# Patient Record
Sex: MALE
[De-identification: names, ages, dates, MRNs, and addresses within clinical notes are randomized per-mention and may not be internally consistent; named-entity substitution may affect disease eponyms.]

---

## 2021-09-10 PROBLEM — Z00.00 ENCOUNTER FOR PREVENTIVE HEALTH EXAMINATION: Status: ACTIVE | Noted: 2021-09-10

## 2021-10-11 ENCOUNTER — APPOINTMENT (OUTPATIENT)
Dept: INTERNAL MEDICINE | Facility: CLINIC | Age: 66
End: 2021-10-11

## 2024-12-11 ENCOUNTER — NON-APPOINTMENT (OUTPATIENT)
Age: 69
End: 2024-12-11

## 2024-12-11 PROBLEM — Z86.0100 HISTORY OF COLONIC POLYPS: Status: RESOLVED | Noted: 2024-12-11 | Resolved: 2024-12-11

## 2024-12-11 PROBLEM — R91.1 LUNG NODULE: Status: ACTIVE | Noted: 2024-12-11

## 2024-12-11 PROBLEM — K21.00 REFLUX ESOPHAGITIS: Status: ACTIVE | Noted: 2024-12-11

## 2024-12-11 PROBLEM — R16.0 LIVER MASS: Status: ACTIVE | Noted: 2024-12-11

## 2024-12-13 ENCOUNTER — APPOINTMENT (OUTPATIENT)
Dept: SURGICAL ONCOLOGY | Facility: CLINIC | Age: 69
End: 2024-12-13

## 2024-12-13 ENCOUNTER — NON-APPOINTMENT (OUTPATIENT)
Age: 69
End: 2024-12-13

## 2024-12-13 VITALS
HEART RATE: 49 BPM | RESPIRATION RATE: 17 BRPM | HEIGHT: 73 IN | BODY MASS INDEX: 20.54 KG/M2 | WEIGHT: 155 LBS | DIASTOLIC BLOOD PRESSURE: 71 MMHG | OXYGEN SATURATION: 99 % | SYSTOLIC BLOOD PRESSURE: 136 MMHG

## 2024-12-13 DIAGNOSIS — K21.00 GASTRO-ESOPHAGEAL REFLUX DISEASE WITH ESOPHAGITIS, WITHOUT BLEEDING: ICD-10-CM

## 2024-12-13 DIAGNOSIS — Z86.0100 PERSONAL HISTORY OF COLON POLYPS, UNSPECIFIED: ICD-10-CM

## 2024-12-13 DIAGNOSIS — R91.1 SOLITARY PULMONARY NODULE: ICD-10-CM

## 2024-12-13 PROCEDURE — 99205 OFFICE O/P NEW HI 60 MIN: CPT

## 2024-12-14 LAB
AFP-TM SERPL-MCNC: <1.8 NG/ML
ALBUMIN SERPL ELPH-MCNC: 4.3 G/DL
ALP BLD-CCNC: 110 U/L
ALT SERPL-CCNC: 16 U/L
ANION GAP SERPL CALC-SCNC: 12 MMOL/L
APTT BLD: 28.8 SEC
AST SERPL-CCNC: 22 U/L
BASOPHILS # BLD AUTO: 0.03 K/UL
BASOPHILS NFR BLD AUTO: 0.7 %
BILIRUB SERPL-MCNC: 0.6 MG/DL
BUN SERPL-MCNC: 16 MG/DL
CALCIUM SERPL-MCNC: 9.3 MG/DL
CANCER AG19-9 SERPL-ACNC: 12 U/ML
CEA SERPL-MCNC: <0.6 NG/ML
CHLORIDE SERPL-SCNC: 104 MMOL/L
CO2 SERPL-SCNC: 25 MMOL/L
CREAT SERPL-MCNC: 1.25 MG/DL
EGFR: 63 ML/MIN/1.73M2
EOSINOPHIL # BLD AUTO: 0.09 K/UL
EOSINOPHIL NFR BLD AUTO: 2.1 %
GLUCOSE SERPL-MCNC: 105 MG/DL
HAV IGM SER QL: NONREACTIVE
HBV CORE IGG+IGM SER QL: NONREACTIVE
HBV SURFACE AB SER QL: NONREACTIVE
HBV SURFACE AG SER QL: NONREACTIVE
HCT VFR BLD CALC: 37.5 %
HCV AB SER QL: NONREACTIVE
HCV S/CO RATIO: 0.09 S/CO
HGB BLD-MCNC: 12.2 G/DL
IMM GRANULOCYTES NFR BLD AUTO: 0.2 %
INR PPP: 0.87 RATIO
LYMPHOCYTES # BLD AUTO: 1.18 K/UL
LYMPHOCYTES NFR BLD AUTO: 27.6 %
MAN DIFF?: NORMAL
MCHC RBC-ENTMCNC: 32 PG
MCHC RBC-ENTMCNC: 32.5 G/DL
MCV RBC AUTO: 98.4 FL
MONOCYTES # BLD AUTO: 0.48 K/UL
MONOCYTES NFR BLD AUTO: 11.2 %
NEUTROPHILS # BLD AUTO: 2.49 K/UL
NEUTROPHILS NFR BLD AUTO: 58.2 %
PLATELET # BLD AUTO: 196 K/UL
POTASSIUM SERPL-SCNC: 4.9 MMOL/L
PROT SERPL-MCNC: 7.4 G/DL
PT BLD: 10.3 SEC
RBC # BLD: 3.81 M/UL
RBC # FLD: 12.4 %
SODIUM SERPL-SCNC: 141 MMOL/L
WBC # FLD AUTO: 4.28 K/UL

## 2024-12-16 ENCOUNTER — APPOINTMENT (OUTPATIENT)
Dept: HEMATOLOGY ONCOLOGY | Facility: CLINIC | Age: 69
End: 2024-12-16
Payer: MEDICARE

## 2024-12-16 VITALS
RESPIRATION RATE: 18 BRPM | WEIGHT: 152 LBS | DIASTOLIC BLOOD PRESSURE: 78 MMHG | BODY MASS INDEX: 20.15 KG/M2 | SYSTOLIC BLOOD PRESSURE: 131 MMHG | HEIGHT: 73 IN | HEART RATE: 36 BPM | OXYGEN SATURATION: 100 % | TEMPERATURE: 97.6 F

## 2024-12-16 PROCEDURE — 99205 OFFICE O/P NEW HI 60 MIN: CPT

## 2024-12-16 PROCEDURE — G2211 COMPLEX E/M VISIT ADD ON: CPT

## 2024-12-16 PROCEDURE — G2212 PROLONG OUTPT/OFFICE VIS: CPT

## 2024-12-17 ENCOUNTER — APPOINTMENT (OUTPATIENT)
Dept: NUCLEAR MEDICINE | Facility: HOSPITAL | Age: 69
End: 2024-12-17

## 2024-12-17 ENCOUNTER — NON-APPOINTMENT (OUTPATIENT)
Age: 69
End: 2024-12-17

## 2024-12-17 ENCOUNTER — OUTPATIENT (OUTPATIENT)
Dept: OUTPATIENT SERVICES | Facility: HOSPITAL | Age: 69
LOS: 1 days | End: 2024-12-17
Payer: MEDICARE

## 2024-12-17 PROCEDURE — 82962 GLUCOSE BLOOD TEST: CPT

## 2024-12-17 PROCEDURE — A9552: CPT

## 2024-12-17 PROCEDURE — 78815 PET IMAGE W/CT SKULL-THIGH: CPT | Mod: 26,PI,MH

## 2024-12-17 PROCEDURE — 78815 PET IMAGE W/CT SKULL-THIGH: CPT

## 2024-12-18 PROBLEM — I10 BENIGN ESSENTIAL HTN: Status: ACTIVE | Noted: 2024-12-18

## 2024-12-18 PROBLEM — J45.909 ACTIVE ASTHMA: Status: ACTIVE | Noted: 2024-12-18

## 2024-12-18 PROBLEM — E78.5 HLD (HYPERLIPIDEMIA): Status: ACTIVE | Noted: 2024-12-18

## 2024-12-19 ENCOUNTER — APPOINTMENT (OUTPATIENT)
Dept: INTERVENTIONAL RADIOLOGY/VASCULAR | Facility: CLINIC | Age: 69
End: 2024-12-19

## 2024-12-19 DIAGNOSIS — Z86.19 PERSONAL HISTORY OF OTHER INFECTIOUS AND PARASITIC DISEASES: ICD-10-CM

## 2024-12-19 DIAGNOSIS — Z78.9 OTHER SPECIFIED HEALTH STATUS: ICD-10-CM

## 2024-12-19 DIAGNOSIS — I10 ESSENTIAL (PRIMARY) HYPERTENSION: ICD-10-CM

## 2024-12-19 DIAGNOSIS — I49.3 VENTRICULAR PREMATURE DEPOLARIZATION: ICD-10-CM

## 2024-12-19 DIAGNOSIS — J45.909 UNSPECIFIED ASTHMA, UNCOMPLICATED: ICD-10-CM

## 2024-12-19 DIAGNOSIS — R16.0 HEPATOMEGALY, NOT ELSEWHERE CLASSIFIED: ICD-10-CM

## 2024-12-19 DIAGNOSIS — Z85.46 PERSONAL HISTORY OF MALIGNANT NEOPLASM OF PROSTATE: ICD-10-CM

## 2024-12-19 DIAGNOSIS — J45.20 MILD INTERMITTENT ASTHMA, UNCOMPLICATED: ICD-10-CM

## 2024-12-19 DIAGNOSIS — E78.5 HYPERLIPIDEMIA, UNSPECIFIED: ICD-10-CM

## 2024-12-19 DIAGNOSIS — G43.909 MIGRAINE, UNSPECIFIED, NOT INTRACTABLE, W/OUT STATUS MIGRAINOSUS: ICD-10-CM

## 2024-12-19 RX ORDER — FAMOTIDINE 20 MG/1
20 TABLET, FILM COATED ORAL
Qty: 120 | Refills: 0 | Status: ACTIVE | COMMUNITY
Start: 2024-07-26

## 2024-12-19 RX ORDER — DILTIAZEM HYDROCHLORIDE 120 MG/1
120 CAPSULE, EXTENDED RELEASE ORAL
Qty: 60 | Refills: 0 | Status: ACTIVE | COMMUNITY
Start: 2023-12-29

## 2024-12-19 RX ORDER — ASCORBIC ACID 100 MG
100 TABLET,CHEWABLE ORAL
Refills: 0 | Status: ACTIVE | COMMUNITY
Start: 2024-12-19

## 2024-12-19 RX ORDER — OMEGA-3/DHA/EPA/FISH OIL 300-1000MG
400 CAPSULE ORAL DAILY
Refills: 0 | Status: ACTIVE | COMMUNITY
Start: 2024-12-19

## 2024-12-19 RX ORDER — ALBUTEROL SULFATE 90 UG/1
108 (90 BASE) INHALANT RESPIRATORY (INHALATION)
Qty: 26 | Refills: 0 | Status: ACTIVE | COMMUNITY
Start: 2024-07-02

## 2024-12-19 RX ORDER — PANTOPRAZOLE 40 MG/1
40 TABLET, DELAYED RELEASE ORAL
Qty: 60 | Refills: 0 | Status: ACTIVE | COMMUNITY
Start: 2024-05-24

## 2024-12-19 RX ORDER — ATORVASTATIN CALCIUM 10 MG/1
10 TABLET, FILM COATED ORAL
Qty: 90 | Refills: 0 | Status: ACTIVE | COMMUNITY
Start: 2024-07-01

## 2024-12-19 RX ORDER — FLUTICASONE FUROATE 100 UG/1
100 POWDER RESPIRATORY (INHALATION)
Qty: 60 | Refills: 0 | Status: ACTIVE | COMMUNITY
Start: 2024-06-10

## 2024-12-19 RX ORDER — MONTELUKAST 10 MG/1
10 TABLET, FILM COATED ORAL
Qty: 60 | Refills: 0 | Status: ACTIVE | COMMUNITY
Start: 2024-09-25

## 2024-12-19 RX ORDER — RIBOFLAVIN (VITAMIN B2) 400 MG
400 TABLET ORAL
Refills: 0 | Status: ACTIVE | COMMUNITY
Start: 2024-12-19

## 2024-12-20 ENCOUNTER — APPOINTMENT (OUTPATIENT)
Dept: HEMATOLOGY ONCOLOGY | Facility: CLINIC | Age: 69
End: 2024-12-20

## 2024-12-23 ENCOUNTER — NON-APPOINTMENT (OUTPATIENT)
Age: 69
End: 2024-12-23

## 2024-12-26 ENCOUNTER — NON-APPOINTMENT (OUTPATIENT)
Age: 69
End: 2024-12-26

## 2024-12-26 ENCOUNTER — RESULT REVIEW (OUTPATIENT)
Age: 69
End: 2024-12-26

## 2024-12-26 ENCOUNTER — TRANSCRIPTION ENCOUNTER (OUTPATIENT)
Age: 69
End: 2024-12-26

## 2024-12-26 ENCOUNTER — OUTPATIENT (OUTPATIENT)
Dept: OUTPATIENT SERVICES | Facility: HOSPITAL | Age: 69
LOS: 1 days | End: 2024-12-26
Payer: MEDICARE

## 2024-12-26 VITALS
WEIGHT: 153 LBS | SYSTOLIC BLOOD PRESSURE: 147 MMHG | TEMPERATURE: 98 F | HEIGHT: 73 IN | HEART RATE: 45 BPM | DIASTOLIC BLOOD PRESSURE: 85 MMHG | OXYGEN SATURATION: 100 % | RESPIRATION RATE: 18 BRPM

## 2024-12-26 VITALS
RESPIRATION RATE: 16 BRPM | OXYGEN SATURATION: 99 % | HEART RATE: 57 BPM | SYSTOLIC BLOOD PRESSURE: 117 MMHG | DIASTOLIC BLOOD PRESSURE: 74 MMHG

## 2024-12-26 DIAGNOSIS — G43.909 MIGRAINE, UNSPECIFIED, NOT INTRACTABLE, WITHOUT STATUS MIGRAINOSUS: ICD-10-CM

## 2024-12-26 LAB
BLD GP AB SCN SERPL QL: NEGATIVE — SIGNIFICANT CHANGE UP
RH IG SCN BLD-IMP: NEGATIVE — SIGNIFICANT CHANGE UP
RH IG SCN BLD-IMP: NEGATIVE — SIGNIFICANT CHANGE UP

## 2024-12-26 PROCEDURE — 76705 ECHO EXAM OF ABDOMEN: CPT | Mod: 26

## 2024-12-26 PROCEDURE — 77001 FLUOROGUIDE FOR VEIN DEVICE: CPT | Mod: 26

## 2024-12-26 PROCEDURE — 86850 RBC ANTIBODY SCREEN: CPT

## 2024-12-26 PROCEDURE — C1788: CPT

## 2024-12-26 PROCEDURE — 36561 INSERT TUNNELED CV CATH: CPT

## 2024-12-26 PROCEDURE — 36561 INSERT TUNNELED CV CATH: CPT | Mod: RT

## 2024-12-26 PROCEDURE — 76705 ECHO EXAM OF ABDOMEN: CPT

## 2024-12-26 PROCEDURE — C1769: CPT

## 2024-12-26 PROCEDURE — C1894: CPT

## 2024-12-26 PROCEDURE — 86901 BLOOD TYPING SEROLOGIC RH(D): CPT

## 2024-12-26 PROCEDURE — 86900 BLOOD TYPING SEROLOGIC ABO: CPT

## 2024-12-26 PROCEDURE — 76937 US GUIDE VASCULAR ACCESS: CPT | Mod: 26

## 2024-12-26 PROCEDURE — 77001 FLUOROGUIDE FOR VEIN DEVICE: CPT

## 2024-12-26 PROCEDURE — 76937 US GUIDE VASCULAR ACCESS: CPT

## 2024-12-26 RX ORDER — ATORVASTATIN CALCIUM 40 MG/1
1 TABLET, FILM COATED ORAL
Refills: 0 | DISCHARGE

## 2024-12-26 RX ORDER — DILTIAZEM HYDROCHLORIDE 300 MG/1
1 CAPSULE, COATED, EXTENDED RELEASE ORAL
Refills: 0 | DISCHARGE

## 2024-12-26 RX ORDER — FENTANYL 75 UG/H
50 PATCH, EXTENDED RELEASE TRANSDERMAL ONCE
Refills: 0 | Status: DISCONTINUED | OUTPATIENT
Start: 2024-12-26 | End: 2024-12-26

## 2024-12-26 RX ORDER — RIBOFLAVIN (VITAMIN B2)
1 POWDER (GRAM) MISCELLANEOUS
Refills: 0 | DISCHARGE

## 2024-12-26 RX ORDER — ONDANSETRON 4 MG/1
4 TABLET ORAL ONCE
Refills: 0 | Status: ACTIVE | OUTPATIENT
Start: 2024-12-26 | End: 2025-11-24

## 2024-12-26 RX ORDER — ALBUTEROL SULFATE 90 UG/1
2 INHALANT RESPIRATORY (INHALATION)
Refills: 0 | DISCHARGE

## 2024-12-26 RX ORDER — PANTOPRAZOLE 40 MG/1
1 TABLET, DELAYED RELEASE ORAL
Refills: 0 | DISCHARGE

## 2024-12-26 RX ORDER — MONTELUKAST SODIUM 10 MG/1
1 TABLET, FILM COATED ORAL
Refills: 0 | DISCHARGE

## 2024-12-26 RX ORDER — FAMOTIDINE 20 MG/1
1 TABLET, FILM COATED ORAL
Refills: 0 | DISCHARGE

## 2024-12-26 RX ORDER — ASCORBIC ACID 1000 MG
0 TABLET ORAL
Refills: 0 | DISCHARGE

## 2024-12-26 NOTE — PROCEDURE NOTE - PROCEDURE FINDINGS AND DETAILS
Technically successful right chest wall port placement w tip terminating in the superior atriocaval junction.

## 2024-12-26 NOTE — PRE-ANESTHESIA EVALUATION ADULT - NSANTHPEFT_GEN_ALL_CORE
Gen: alert and oriented x3  Lung: clear   CV: S1 S2   Ext: No edema  neuro: CN 2-12 grossly intact, no gross motor or sensory deficits appreciated  Skin: normal

## 2024-12-26 NOTE — ASU PATIENT PROFILE, ADULT - NSICDXPASTMEDICALHX_GEN_ALL_CORE_FT
PAST MEDICAL HISTORY:  Colonic polyp     Lung nodule     Malignant neoplasm of prostate     Reflux esophagitis

## 2024-12-26 NOTE — ASU DISCHARGE PLAN (ADULT/PEDIATRIC) - FINANCIAL ASSISTANCE
[facility] provides services at a reduced cost to those who are determined to be eligible through [facility]’s financial assistance program. Information regarding [facility]’s financial assistance program can be found by going to [email or web address] or by calling [phone].

## 2024-12-26 NOTE — H&P ADULT - HISTORY OF PRESENT ILLNESS
Interventional Radiology    HPI: 69y Male with liver lesion presents for liver lesion bx and port placement.    Review of Systems:   Constitutional: No fever  Neurological: No headaches  Respiratory: No cough, wheezing, chills or hemoptysis; No shortness of breath  Cardiovascular: No chest pain, palpitations, dizziness  Gastrointestinal: No abdominal or epigastric pain    Allergies: aspirin (Other)  penicillins (Other)    Medications (Abx/Cardiac/Anticoagulation/Blood Products)      Data:  185.4  69.4  T(C): 36.4  HR: 45  BP: 147/85  RR: 18  SpO2: 100%            Physical Exam  General: No acute distress, nontoxic, A&Ox3  Chest: Non labored breathing  Abdomen: Non-distended, non-tender, no preitoneal signs  Extremities: Warm    RADIOLOGY & ADDITIONAL TESTS:    Imaging Reviewed    Plan: 69y Male with liver lesion presents for liver lesion bx and port placement.  -Risks/Benefits/alternatives explained with the patient and/or healthcare proxy and witnessed informed consent obtained.

## 2024-12-26 NOTE — ASU DISCHARGE PLAN (ADULT/PEDIATRIC) - ASU DC SPECIAL INSTRUCTIONSFT
Biopsy Discharge Instructions    - You have had a biopsy of your liver lesion  - You may shower in 24 hours. No soaking or swimming until the site is completely healed.  - Keep the area covered and dry for the next 24 hours.  - Do not perform any heavy lifting for the next few days or until the site is healed.  - You may resume your normal diet.  - You may resume your normal medications however you should wait 48 hours before restarting aspirin, plavix, or blood thinners.  - It is normal to experience some pain over the site for the next few days. You may take apply ice to the area (20 minutes on, 20 minutes off) and take Tylenol for that pain. Do not take more frequently than every 6 hours and do not exceed more than 3000mg of Tylenol in a 24 hour period.    - You were given conscious sedation which may make you drowsy, therefore you need someone to stay with you until the morning following the procedure.  - Do not drive, engage in heavy lifting or strenuous activity, or drink any alcoholic beverages for the next 24 hours.   - You may resume normal activity in 24 hours.    Notify your primary physician and/or Interventional Radiology IMMEDIATELY if you experience any of the following       - Fever of 101F or 38C       - Chills or Rigors/ Shakes       - Swelling and/or Redness in the area around the biopsy site       - Worsening Pain       - Blood soaked bandages or worsening bleeding       - Lightheadedness and/or dizziness upon standing       - Chest Pain/ Tightness       - Shortness of Breath       - Difficulty walking    If you have a problem that you believe requires IMMEDIATE attention, please go to your NEAREST Emergency Room. If you believe your problem can safely wait until you speak to a physician, please call Interventional Radiology for any concerns.    During Normal Weekday Business Hours- You can contact the Interventional Radiology department during normal business hours via telephone.  During Evenings and Weekends- If you need to contact Interventional Radiology during off hours, do so by calling the hospital and requesting to be connected to the Interventional Radiologist on call.    Chest Port Placement    Discharge Instructions  - You have had a chest port implanted in your chest.   - The port is ready for use.  - You may shower in 48 hours. No soaking or swimming for 2 weeks or until the site is completely healed.  - Keep the area covered and dry for the next 7 days. It may be removed by a chemotherapy nurse as needed for treatment.  - Do not perform any heavy lifting or put tension on the area for the next week or until the site is healed.  - The skin glue (Dermabond) on your skin will act as a scab, allow it to fall off on its own over the next 1-3 weeks.  - You may resume your normal diet.  - You may resume your normal medications however you should wait 48 hours before restarting aspirin, plavix, or blood thinners.  - It is normal to experience some pain over the site for the next few days. You may take apply ice to the area (20 minutes on, 20 minutes off) and take Tylenol for that pain. Do not take more frequently than every 6 hours and do not exceed more than 3000mg of Tylenol in a 24 hour period.    - You were given conscious sedation which may make you drowsy, therefore you need someone to stay with you until the morning following the procedure.  - Do not drive, engage in heavy lifting or strenuous activity, or drink any alcoholic beverages for the next 24 hours.   - You may resume normal activity in 24 hours.    Notify your primary physician and/or Interventional Radiology IMMEDIATELY if you experience any of the following       - Fever of 101F or 38C       - Chills or Rigors/ Shakes       - Swelling and/or Redness in the area around the port       - Worsening Pain       - Blood soaked bandages or worsening bleeding       - Lightheadedness and/or dizziness upon standing       - Chest Pain/ Tightness       - Shortness of Breath       - Difficulty walking    If you have a problem that you believe requires IMMEDIATE attention, please go to your NEAREST Emergency Room. If you believe your problem can safely wait until you speak to a physician, please call Interventional Radiology for any concerns.    During Normal Weekday Business Hours- You can contact the Interventional Radiology department during normal business hours via telephone.  During Evenings and Weekends- If you need to contact Interventional Radiology during off hours, do so by calling the hospital and requesting to be connected to the Interventional Radiologist on call. Chest Port Placement    Discharge Instructions  - You have had a chest port implanted in your chest.   - The port is ready for use.  - You may shower in 48 hours. No soaking or swimming for 2 weeks or until the site is completely healed.  - Keep the area covered and dry for the next 7 days. It may be removed by a chemotherapy nurse as needed for treatment.  - Do not perform any heavy lifting or put tension on the area for the next week or until the site is healed.  - The skin glue (Dermabond) on your skin will act as a scab, allow it to fall off on its own over the next 1-3 weeks.  - You may resume your normal diet.  - You may resume your normal medications however you should wait 48 hours before restarting aspirin, plavix, or blood thinners.  - It is normal to experience some pain over the site for the next few days. You may take apply ice to the area (20 minutes on, 20 minutes off) and take Tylenol for that pain. Do not take more frequently than every 6 hours and do not exceed more than 3000mg of Tylenol in a 24 hour period.    - You were given conscious sedation which may make you drowsy, therefore you need someone to stay with you until the morning following the procedure.  - Do not drive, engage in heavy lifting or strenuous activity, or drink any alcoholic beverages for the next 24 hours.   - You may resume normal activity in 24 hours.    Notify your primary physician and/or Interventional Radiology IMMEDIATELY if you experience any of the following       - Fever of 101F or 38C       - Chills or Rigors/ Shakes       - Swelling and/or Redness in the area around the port       - Worsening Pain       - Blood soaked bandages or worsening bleeding       - Lightheadedness and/or dizziness upon standing       - Chest Pain/ Tightness       - Shortness of Breath       - Difficulty walking    If you have a problem that you believe requires IMMEDIATE attention, please go to your NEAREST Emergency Room. If you believe your problem can safely wait until you speak to a physician, please call Interventional Radiology for any concerns.    During Normal Weekday Business Hours- You can contact the Interventional Radiology department during normal business hours via telephone.  During Evenings and Weekends- If you need to contact Interventional Radiology during off hours, do so by calling the hospital and requesting to be connected to the Interventional Radiologist on call.

## 2024-12-26 NOTE — H&P ADULT - NSCORESITESY/N_GEN_A_CORE_RD
Reason for Call: Request for an order or referral:    Order or referral being requested: Ultrasound Order    Date needed: as soon as possible    Has the patient been seen by the PCP for this problem? YES    Additional comments: Patient wife requesting for ultrasound for stress echocardiogram. Patient wife does not need a call back, please mychart patient when order is ready. Please advise.     Phone number Patient can be reached at:  Other phone number:  [phone]    Best Time:  Anytime    Can we leave a detailed message on this number?  YES    Call taken on 2/25/2019 at 2:13 PM by Shonda Lux   No

## 2024-12-26 NOTE — PRE-ANESTHESIA EVALUATION ADULT - NSANTHOSAYNRD_GEN_A_CORE
No. YANY screening performed.  STOP BANG Legend: 0-2 = LOW Risk; 3-4 = INTERMEDIATE Risk; 5-8 = HIGH Risk

## 2024-12-27 ENCOUNTER — APPOINTMENT (OUTPATIENT)
Dept: INTERVENTIONAL RADIOLOGY/VASCULAR | Facility: HOSPITAL | Age: 69
End: 2024-12-27

## 2024-12-27 PROBLEM — R91.1 SOLITARY PULMONARY NODULE: Chronic | Status: ACTIVE | Noted: 2024-12-26

## 2024-12-27 PROBLEM — C61 MALIGNANT NEOPLASM OF PROSTATE: Chronic | Status: ACTIVE | Noted: 2024-12-26

## 2024-12-30 ENCOUNTER — APPOINTMENT (OUTPATIENT)
Dept: GASTROENTEROLOGY | Facility: CLINIC | Age: 69
End: 2024-12-30

## 2024-12-30 PROBLEM — K63.5 POLYP OF COLON: Chronic | Status: ACTIVE | Noted: 2024-12-26

## 2024-12-30 PROBLEM — K21.00 GASTRO-ESOPHAGEAL REFLUX DISEASE WITH ESOPHAGITIS, WITHOUT BLEEDING: Chronic | Status: ACTIVE | Noted: 2024-12-26

## 2024-12-30 PROBLEM — R93.3 ABNORMAL FINDING ON GI TRACT IMAGING: Status: ACTIVE | Noted: 2024-12-30

## 2024-12-30 PROCEDURE — 99204 OFFICE O/P NEW MOD 45 MIN: CPT

## 2024-12-31 ENCOUNTER — NON-APPOINTMENT (OUTPATIENT)
Age: 69
End: 2024-12-31

## 2024-12-31 DIAGNOSIS — Z45.2 ENCOUNTER FOR ADJUSTMENT AND MANAGEMENT OF VASCULAR ACCESS DEVICE: ICD-10-CM

## 2024-12-31 DIAGNOSIS — K76.9 LIVER DISEASE, UNSPECIFIED: ICD-10-CM

## 2025-01-03 ENCOUNTER — RESULT REVIEW (OUTPATIENT)
Age: 70
End: 2025-01-03

## 2025-01-03 ENCOUNTER — OUTPATIENT (OUTPATIENT)
Dept: OUTPATIENT SERVICES | Facility: HOSPITAL | Age: 70
LOS: 1 days | Discharge: HOME | End: 2025-01-03
Payer: MEDICARE

## 2025-01-03 ENCOUNTER — APPOINTMENT (OUTPATIENT)
Dept: GASTROENTEROLOGY | Facility: HOSPITAL | Age: 70
End: 2025-01-03

## 2025-01-03 ENCOUNTER — NON-APPOINTMENT (OUTPATIENT)
Age: 70
End: 2025-01-03

## 2025-01-03 ENCOUNTER — TRANSCRIPTION ENCOUNTER (OUTPATIENT)
Age: 70
End: 2025-01-03

## 2025-01-03 PROCEDURE — 88342 IMHCHEM/IMCYTCHM 1ST ANTB: CPT | Mod: 26

## 2025-01-03 PROCEDURE — 88305 TISSUE EXAM BY PATHOLOGIST: CPT | Mod: 26

## 2025-01-03 PROCEDURE — 88173 CYTOPATH EVAL FNA REPORT: CPT | Mod: 26

## 2025-01-03 PROCEDURE — 88173 CYTOPATH EVAL FNA REPORT: CPT

## 2025-01-03 PROCEDURE — 88305 TISSUE EXAM BY PATHOLOGIST: CPT

## 2025-01-03 PROCEDURE — 43242 EGD US FINE NEEDLE BX/ASPIR: CPT

## 2025-01-03 PROCEDURE — 43239 EGD BIOPSY SINGLE/MULTIPLE: CPT | Mod: XS

## 2025-01-03 PROCEDURE — 88342 IMHCHEM/IMCYTCHM 1ST ANTB: CPT

## 2025-01-03 NOTE — PRE-ANESTHESIA EVALUATION ADULT - NSANTHPMHFT_GEN_ALL_CORE
The patient is a 69-year-old retired [place]  reports he was at his baseline state of health   until he underwent surveillance imaging for known lung disease. Due to partial abdominal imaging, he was found   to have a hepatic dome lesion. 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 He is yet to undergo biopsy. He reports no symptoms, these include no weight loss,   no fatigue, and no change in functional status.  No toxic habits now or in the past. Reports his father  of esophageal cancer at the age of 60. He exercises   daily with resistance bands. He had a recent endoscopy with Dr. Jorge Castrejon that was unremarkable, but was   prompted by thick gastric wall findings and a lifelong history of reflux.  His workup has been expedited for EGD EUS with me at [facility] on Friday, target the GH lymph node. He underwent  port placement and failed IR biopsy last week by Dr. Morales.  Lives on the [facility].  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 Treated by Dr. Bland at [facility]. No Pneumonitis history per Dr. Bland. No autoimmune   disease. Additionally reports. 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  Previously treated for Prostate Cancer at [facility], pending second opinion there for this malignancy as well.   Active Problems  Active asthma (493.90) (J45.909)  Benign essential HTN (401.1) (I10)  HLD (hyperlipidemia) (272.4) (E78.5)  Liver mass (573.8) (R16.0)  Lung nodule (793.11) (R91.1)  Migraine (346.90) (G43.909)  Mild intermittent asthma without complication (493.90) (J45.20)  Premature ventricular contraction (427.69) (I49.3)  Reflux esophagitis (530.11) (K21.00)  Past Medical History  History of aspergillosis (V12.09) (Z86.19)            History of colonic polyps (V12.72) (Z86.0100)  History of malignant neoplasm of prostate (V10.46) (Z85.46)  Lung nodule (793.11) (R91.1)  Reflux esophagitis (530.11) (K21.00)  Surgical History  History of Cyst excision  History of Lung biopsy

## 2025-01-06 ENCOUNTER — OUTPATIENT (OUTPATIENT)
Dept: OUTPATIENT SERVICES | Facility: HOSPITAL | Age: 70
LOS: 1 days | End: 2025-01-06
Payer: MEDICARE

## 2025-01-06 ENCOUNTER — APPOINTMENT (OUTPATIENT)
Dept: HEMATOLOGY ONCOLOGY | Facility: CLINIC | Age: 70
End: 2025-01-06
Payer: MEDICARE

## 2025-01-06 ENCOUNTER — APPOINTMENT (OUTPATIENT)
Dept: INFUSION THERAPY | Facility: CLINIC | Age: 70
End: 2025-01-06

## 2025-01-06 ENCOUNTER — NON-APPOINTMENT (OUTPATIENT)
Age: 70
End: 2025-01-06

## 2025-01-06 VITALS
DIASTOLIC BLOOD PRESSURE: 83 MMHG | RESPIRATION RATE: 18 BRPM | WEIGHT: 155 LBS | TEMPERATURE: 97 F | HEART RATE: 56 BPM | BODY MASS INDEX: 20.54 KG/M2 | SYSTOLIC BLOOD PRESSURE: 127 MMHG | HEIGHT: 73 IN | OXYGEN SATURATION: 100 %

## 2025-01-06 VITALS
WEIGHT: 156.97 LBS | TEMPERATURE: 97 F | RESPIRATION RATE: 18 BRPM | HEIGHT: 73 IN | HEART RATE: 61 BPM | SYSTOLIC BLOOD PRESSURE: 134 MMHG | OXYGEN SATURATION: 99 % | DIASTOLIC BLOOD PRESSURE: 80 MMHG

## 2025-01-06 DIAGNOSIS — R93.3 ABNORMAL FINDINGS ON DIAGNOSTIC IMAGING OF OTHER PARTS OF DIGESTIVE TRACT: ICD-10-CM

## 2025-01-06 DIAGNOSIS — R16.0 HEPATOMEGALY, NOT ELSEWHERE CLASSIFIED: ICD-10-CM

## 2025-01-06 DIAGNOSIS — K21.00 GASTRO-ESOPHAGEAL REFLUX DISEASE WITH ESOPHAGITIS, WITHOUT BLEEDING: ICD-10-CM

## 2025-01-06 DIAGNOSIS — C22.1 INTRAHEPATIC BILE DUCT CARCINOMA: ICD-10-CM

## 2025-01-06 DIAGNOSIS — R91.1 SOLITARY PULMONARY NODULE: ICD-10-CM

## 2025-01-06 LAB
T4 FREE SERPL-MCNC: 1.1 NG/DL — SIGNIFICANT CHANGE UP (ref 0.93–1.7)
TSH SERPL-MCNC: 2.44 UIU/ML — SIGNIFICANT CHANGE UP (ref 0.27–4.2)

## 2025-01-06 PROCEDURE — 36415 COLL VENOUS BLD VENIPUNCTURE: CPT

## 2025-01-06 PROCEDURE — G2212 PROLONG OUTPT/OFFICE VIS: CPT

## 2025-01-06 PROCEDURE — 96413 CHEMO IV INFUSION 1 HR: CPT

## 2025-01-06 PROCEDURE — 96417 CHEMO IV INFUS EACH ADDL SEQ: CPT

## 2025-01-06 PROCEDURE — G2211 COMPLEX E/M VISIT ADD ON: CPT

## 2025-01-06 PROCEDURE — 96367 TX/PROPH/DG ADDL SEQ IV INF: CPT

## 2025-01-06 PROCEDURE — 84439 ASSAY OF FREE THYROXINE: CPT

## 2025-01-06 PROCEDURE — 99215 OFFICE O/P EST HI 40 MIN: CPT

## 2025-01-06 PROCEDURE — 96375 TX/PRO/DX INJ NEW DRUG ADDON: CPT

## 2025-01-06 PROCEDURE — 84443 ASSAY THYROID STIM HORMONE: CPT

## 2025-01-06 RX ORDER — PALONOSETRON HYDROCHLORIDE 0.25 MG/5ML
0.25 INJECTION INTRAVENOUS ONCE
Refills: 0 | Status: COMPLETED | OUTPATIENT
Start: 2025-01-06 | End: 2025-01-06

## 2025-01-06 RX ORDER — LIDOCAINE AND PRILOCAINE 25; 25 MG/G; MG/G
2.5-2.5 CREAM TOPICAL
Qty: 1 | Refills: 3 | Status: ACTIVE | COMMUNITY
Start: 2025-01-06 | End: 1900-01-01

## 2025-01-06 RX ORDER — ONDANSETRON 8 MG/1
8 TABLET ORAL 3 TIMES DAILY
Qty: 90 | Refills: 3 | Status: ACTIVE | COMMUNITY
Start: 2025-01-06 | End: 1900-01-01

## 2025-01-06 RX ORDER — DURVALUMAB 500 MG/10ML
1500 INJECTION, SOLUTION INTRAVENOUS ONCE
Refills: 0 | Status: COMPLETED | OUTPATIENT
Start: 2025-01-06 | End: 2025-01-06

## 2025-01-06 RX ORDER — POSACONAZOLE 100 MG/1
100 TABLET, DELAYED RELEASE ORAL DAILY
Qty: 270 | Refills: 0 | Status: ACTIVE | COMMUNITY
Start: 2025-01-06 | End: 1900-01-01

## 2025-01-06 RX ORDER — GEMCITABINE 38 MG/ML
1900 INJECTION, SOLUTION INTRAVENOUS ONCE
Refills: 0 | Status: COMPLETED | OUTPATIENT
Start: 2025-01-06 | End: 2025-01-06

## 2025-01-06 RX ORDER — CISPLATIN 1 MG/ML
47 INJECTION INTRAVENOUS ONCE
Refills: 0 | Status: COMPLETED | OUTPATIENT
Start: 2025-01-06 | End: 2025-01-06

## 2025-01-06 RX ORDER — SODIUM CHLORIDE 9 MG/ML
1000 INJECTION, SOLUTION INTRAVENOUS
Refills: 0 | Status: COMPLETED | OUTPATIENT
Start: 2025-01-06 | End: 2025-01-06

## 2025-01-06 RX ORDER — DEXAMETHASONE SODIUM PHOSPHATE 4 MG/ML
10 VIAL (ML) INJECTION ONCE
Refills: 0 | Status: COMPLETED | OUTPATIENT
Start: 2025-01-06 | End: 2025-01-06

## 2025-01-06 RX ORDER — LIDOCAINE 50 MG/G
1 OINTMENT TOPICAL ONCE
Refills: 0 | Status: COMPLETED | OUTPATIENT
Start: 2025-01-06 | End: 2025-01-06

## 2025-01-06 RX ORDER — FOSAPREPITANT DIMEGLUMINE 150 MG/5ML
150 INJECTION, POWDER, LYOPHILIZED, FOR SOLUTION INTRAVENOUS ONCE
Refills: 0 | Status: COMPLETED | OUTPATIENT
Start: 2025-01-06 | End: 2025-01-06

## 2025-01-06 RX ORDER — SODIUM CHLORIDE 9 MG/ML
10 INJECTION, SOLUTION INTRAMUSCULAR; INTRAVENOUS; SUBCUTANEOUS ONCE
Refills: 0 | Status: COMPLETED | OUTPATIENT
Start: 2025-01-06 | End: 2025-01-06

## 2025-01-06 RX ORDER — SODIUM CHLORIDE 9 MG/ML
500 INJECTION, SOLUTION INTRAMUSCULAR; INTRAVENOUS; SUBCUTANEOUS ONCE
Refills: 0 | Status: COMPLETED | OUTPATIENT
Start: 2025-01-06 | End: 2025-01-06

## 2025-01-06 RX ADMIN — Medication 10 MILLIGRAM(S): at 12:45

## 2025-01-06 RX ADMIN — PALONOSETRON HYDROCHLORIDE 0.25 MILLIGRAM(S): 0.25 INJECTION INTRAVENOUS at 13:50

## 2025-01-06 RX ADMIN — GEMCITABINE 1900 MILLIGRAM(S): 38 INJECTION, SOLUTION INTRAVENOUS at 15:05

## 2025-01-06 RX ADMIN — SODIUM CHLORIDE 1000 MILLILITER(S): 9 INJECTION, SOLUTION INTRAMUSCULAR; INTRAVENOUS; SUBCUTANEOUS at 11:50

## 2025-01-06 RX ADMIN — FOSAPREPITANT DIMEGLUMINE 150 MILLIGRAM(S): 150 INJECTION, POWDER, LYOPHILIZED, FOR SOLUTION INTRAVENOUS at 13:40

## 2025-01-06 RX ADMIN — DURVALUMAB 1500 MILLIGRAM(S): 500 INJECTION, SOLUTION INTRAVENOUS at 13:55

## 2025-01-06 RX ADMIN — CISPLATIN 47 MILLIGRAM(S): 1 INJECTION INTRAVENOUS at 15:15

## 2025-01-06 RX ADMIN — SODIUM CHLORIDE 507 MILLILITER(S): 9 INJECTION, SOLUTION INTRAVENOUS at 16:45

## 2025-01-06 RX ADMIN — Medication 204 MILLIGRAM(S): at 12:25

## 2025-01-06 RX ADMIN — FOSAPREPITANT DIMEGLUMINE 500 MILLIGRAM(S): 150 INJECTION, POWDER, LYOPHILIZED, FOR SOLUTION INTRAVENOUS at 12:50

## 2025-01-06 RX ADMIN — SODIUM CHLORIDE 1000 MILLILITER(S): 9 INJECTION, SOLUTION INTRAVENOUS at 18:55

## 2025-01-06 RX ADMIN — SODIUM CHLORIDE 10 MILLILITER(S): 9 INJECTION, SOLUTION INTRAMUSCULAR; INTRAVENOUS; SUBCUTANEOUS at 18:55

## 2025-01-06 RX ADMIN — CISPLATIN 47 MILLIGRAM(S): 1 INJECTION INTRAVENOUS at 16:45

## 2025-01-06 RX ADMIN — DURVALUMAB 1500 MILLIGRAM(S): 500 INJECTION, SOLUTION INTRAVENOUS at 14:25

## 2025-01-06 RX ADMIN — GEMCITABINE 1900 MILLIGRAM(S): 38 INJECTION, SOLUTION INTRAVENOUS at 14:35

## 2025-01-06 RX ADMIN — SODIUM CHLORIDE 500 MILLILITER(S): 9 INJECTION, SOLUTION INTRAMUSCULAR; INTRAVENOUS; SUBCUTANEOUS at 12:20

## 2025-01-07 ENCOUNTER — NON-APPOINTMENT (OUTPATIENT)
Age: 70
End: 2025-01-07

## 2025-01-08 LAB
ALBUMIN SERPL ELPH-MCNC: 3.6 G/DL
ALP BLD-CCNC: 62 U/L
ALT SERPL-CCNC: 23 U/L
ANION GAP SERPL CALC-SCNC: -4 MMOL/L
AST SERPL-CCNC: 24 U/L
BILIRUB SERPL-MCNC: 1.1 MG/DL
BUN SERPL-MCNC: 15 MG/DL
CALCIUM SERPL-MCNC: 9.2 MG/DL
CHLORIDE SERPL-SCNC: 112 MMOL/L
CO2 SERPL-SCNC: 27 MMOL/L
CREAT SERPL-MCNC: 0.9 MG/DL
EGFR: 94 ML/MIN/1.73M2
GLUCOSE SERPL-MCNC: 98 MG/DL
HCT VFR BLD CALC: 34.3 %
HGB BLD-MCNC: 11.9 G/DL
LYMPHOCYTES # BLD AUTO: 1.3 K/UL
LYMPHOCYTES NFR BLD AUTO: 24.3 %
MAGNESIUM SERPL-MCNC: 2.1 MG/DL
MAN DIFF?: NO
MCHC RBC-ENTMCNC: 32 PG
MCHC RBC-ENTMCNC: 34.7 G/DL
MCV RBC AUTO: 92.2 FL
NEUTROPHILS # BLD AUTO: 3.3 K/UL
NEUTROPHILS NFR BLD AUTO: 64.9 %
PLATELET # BLD AUTO: 147 K/UL
POTASSIUM SERPL-SCNC: 4.5 MMOL/L
PROT SERPL-MCNC: 7.1 G/DL
RBC # BLD: 3.72 M/UL
RBC # FLD: 12.1 %
SODIUM SERPL-SCNC: 135 MMOL/L
WBC # FLD AUTO: 5.2 K/UL

## 2025-01-09 RX ORDER — SODIUM CHLORIDE 9 MG/ML
1000 INJECTION, SOLUTION INTRAVENOUS
Refills: 0 | Status: COMPLETED | OUTPATIENT
Start: 2025-01-13 | End: 2025-01-13

## 2025-01-09 RX ORDER — LIDOCAINE 50 MG/G
1 OINTMENT TOPICAL ONCE
Refills: 0 | Status: COMPLETED | OUTPATIENT
Start: 2025-01-13 | End: 2025-01-13

## 2025-01-09 RX ORDER — SODIUM CHLORIDE 9 MG/ML
10 INJECTION, SOLUTION INTRAMUSCULAR; INTRAVENOUS; SUBCUTANEOUS ONCE
Refills: 0 | Status: COMPLETED | OUTPATIENT
Start: 2025-01-13 | End: 2025-01-13

## 2025-01-13 ENCOUNTER — OUTPATIENT (OUTPATIENT)
Dept: OUTPATIENT SERVICES | Facility: HOSPITAL | Age: 70
LOS: 1 days | End: 2025-01-13
Payer: MEDICARE

## 2025-01-13 ENCOUNTER — APPOINTMENT (OUTPATIENT)
Dept: INFUSION THERAPY | Facility: CLINIC | Age: 70
End: 2025-01-13

## 2025-01-13 VITALS
RESPIRATION RATE: 17 BRPM | DIASTOLIC BLOOD PRESSURE: 68 MMHG | HEIGHT: 73 IN | SYSTOLIC BLOOD PRESSURE: 110 MMHG | HEART RATE: 52 BPM | TEMPERATURE: 98 F | WEIGHT: 151.02 LBS | OXYGEN SATURATION: 100 %

## 2025-01-13 VITALS
SYSTOLIC BLOOD PRESSURE: 112 MMHG | HEART RATE: 60 BPM | DIASTOLIC BLOOD PRESSURE: 68 MMHG | OXYGEN SATURATION: 97 % | TEMPERATURE: 98 F | RESPIRATION RATE: 18 BRPM

## 2025-01-13 DIAGNOSIS — D45 POLYCYTHEMIA VERA: ICD-10-CM

## 2025-01-13 DIAGNOSIS — C22.1 INTRAHEPATIC BILE DUCT CARCINOMA: ICD-10-CM

## 2025-01-13 LAB
ALBUMIN SERPL ELPH-MCNC: 3.6 G/DL — SIGNIFICANT CHANGE UP (ref 3.3–5)
ALP SERPL-CCNC: 62 U/L — SIGNIFICANT CHANGE UP (ref 40–120)
ALT FLD-CCNC: 41 U/L — SIGNIFICANT CHANGE UP (ref 10–45)
ANION GAP SERPL CALC-SCNC: 0 MMOL/L — LOW (ref 5–17)
AST SERPL-CCNC: 37 U/L — SIGNIFICANT CHANGE UP (ref 10–40)
BILIRUB SERPL-MCNC: 1 MG/DL — SIGNIFICANT CHANGE UP (ref 0.2–1.2)
BUN SERPL-MCNC: 19 MG/DL — SIGNIFICANT CHANGE UP (ref 7–23)
CALCIUM SERPL-MCNC: 9.2 MG/DL — SIGNIFICANT CHANGE UP (ref 8.4–10.5)
CHLORIDE SERPL-SCNC: 107 MMOL/L — SIGNIFICANT CHANGE UP (ref 96–108)
CO2 SERPL-SCNC: 28 MMOL/L — SIGNIFICANT CHANGE UP (ref 22–31)
CREAT SERPL-MCNC: 1 MG/DL — SIGNIFICANT CHANGE UP (ref 0.5–1.3)
EGFR: 82 ML/MIN/1.73M2 — SIGNIFICANT CHANGE UP
GLUCOSE SERPL-MCNC: 87 MG/DL — SIGNIFICANT CHANGE UP (ref 70–99)
HCT VFR BLD CALC: 32 % — LOW (ref 39–50)
HGB BLD-MCNC: 11.3 G/DL — LOW (ref 13–17)
LYMPHOCYTES # BLD AUTO: 1.1 K/UL — SIGNIFICANT CHANGE UP (ref 1–3.3)
LYMPHOCYTES # BLD AUTO: 34.2 % — SIGNIFICANT CHANGE UP (ref 13–44)
MAGNESIUM SERPL-MCNC: 2.2 MG/DL — SIGNIFICANT CHANGE UP (ref 1.6–2.6)
MCHC RBC-ENTMCNC: 32.3 PG — SIGNIFICANT CHANGE UP (ref 27–34)
MCHC RBC-ENTMCNC: 35.3 G/DL — SIGNIFICANT CHANGE UP (ref 32–36)
MCV RBC AUTO: 91.4 FL — SIGNIFICANT CHANGE UP (ref 80–100)
NEUTROPHILS # BLD AUTO: 2 K/UL — SIGNIFICANT CHANGE UP (ref 1.8–7.4)
NEUTROPHILS NFR BLD AUTO: 62.3 % — SIGNIFICANT CHANGE UP (ref 43–77)
PLATELET # BLD AUTO: 123 K/UL — LOW (ref 150–400)
POTASSIUM SERPL-MCNC: 5 MMOL/L — SIGNIFICANT CHANGE UP (ref 3.5–5.3)
POTASSIUM SERPL-SCNC: 5 MMOL/L — SIGNIFICANT CHANGE UP (ref 3.5–5.3)
PROT SERPL-MCNC: 7 G/DL — SIGNIFICANT CHANGE UP (ref 6–8.3)
RBC # BLD: 3.5 M/UL — LOW (ref 4.2–5.8)
RBC # FLD: 11.6 % — SIGNIFICANT CHANGE UP (ref 10.3–14.5)
SODIUM SERPL-SCNC: 135 MMOL/L — SIGNIFICANT CHANGE UP (ref 135–145)
WBC # BLD: 3.2 K/UL — LOW (ref 3.8–10.5)
WBC # FLD AUTO: 3.2 K/UL — LOW (ref 3.8–10.5)

## 2025-01-13 PROCEDURE — 96413 CHEMO IV INFUSION 1 HR: CPT

## 2025-01-13 PROCEDURE — 85025 COMPLETE CBC W/AUTO DIFF WBC: CPT

## 2025-01-13 PROCEDURE — 96375 TX/PRO/DX INJ NEW DRUG ADDON: CPT

## 2025-01-13 PROCEDURE — 36415 COLL VENOUS BLD VENIPUNCTURE: CPT

## 2025-01-13 PROCEDURE — 80053 COMPREHEN METABOLIC PANEL: CPT

## 2025-01-13 PROCEDURE — 83735 ASSAY OF MAGNESIUM: CPT

## 2025-01-13 PROCEDURE — 96367 TX/PROPH/DG ADDL SEQ IV INF: CPT

## 2025-01-13 PROCEDURE — 96417 CHEMO IV INFUS EACH ADDL SEQ: CPT

## 2025-01-13 RX ORDER — DEXAMETHASONE SODIUM PHOSPHATE 4 MG/ML
10 VIAL (ML) INJECTION ONCE
Refills: 0 | Status: COMPLETED | OUTPATIENT
Start: 2025-01-13 | End: 2025-01-13

## 2025-01-13 RX ORDER — FOSAPREPITANT DIMEGLUMINE 150 MG/5ML
150 INJECTION, POWDER, LYOPHILIZED, FOR SOLUTION INTRAVENOUS ONCE
Refills: 0 | Status: COMPLETED | OUTPATIENT
Start: 2025-01-13 | End: 2025-01-13

## 2025-01-13 RX ORDER — PALONOSETRON HYDROCHLORIDE 0.25 MG/5ML
0.25 INJECTION INTRAVENOUS ONCE
Refills: 0 | Status: COMPLETED | OUTPATIENT
Start: 2025-01-13 | End: 2025-01-13

## 2025-01-13 RX ORDER — SODIUM CHLORIDE 9 MG/ML
500 INJECTION, SOLUTION INTRAMUSCULAR; INTRAVENOUS; SUBCUTANEOUS ONCE
Refills: 0 | Status: COMPLETED | OUTPATIENT
Start: 2025-01-13 | End: 2025-01-13

## 2025-01-13 RX ORDER — CISPLATIN 1 MG/ML
47 INJECTION INTRAVENOUS ONCE
Refills: 0 | Status: COMPLETED | OUTPATIENT
Start: 2025-01-13 | End: 2025-01-13

## 2025-01-13 RX ORDER — GEMCITABINE 38 MG/ML
1900 INJECTION, SOLUTION INTRAVENOUS ONCE
Refills: 0 | Status: COMPLETED | OUTPATIENT
Start: 2025-01-13 | End: 2025-01-13

## 2025-01-13 RX ADMIN — SODIUM CHLORIDE 500 MILLILITER(S): 9 INJECTION, SOLUTION INTRAMUSCULAR; INTRAVENOUS; SUBCUTANEOUS at 09:37

## 2025-01-13 RX ADMIN — FOSAPREPITANT DIMEGLUMINE 150 MILLIGRAM(S): 150 INJECTION, POWDER, LYOPHILIZED, FOR SOLUTION INTRAVENOUS at 09:58

## 2025-01-13 RX ADMIN — PALONOSETRON HYDROCHLORIDE 0.25 MILLIGRAM(S): 0.25 INJECTION INTRAVENOUS at 09:15

## 2025-01-13 RX ADMIN — GEMCITABINE 1900 MILLIGRAM(S): 38 INJECTION, SOLUTION INTRAVENOUS at 11:20

## 2025-01-13 RX ADMIN — SODIUM CHLORIDE 1000 MILLILITER(S): 9 INJECTION, SOLUTION INTRAVENOUS at 14:00

## 2025-01-13 RX ADMIN — GEMCITABINE 1900 MILLIGRAM(S): 38 INJECTION, SOLUTION INTRAVENOUS at 12:00

## 2025-01-13 RX ADMIN — Medication 204 MILLIGRAM(S): at 10:02

## 2025-01-13 RX ADMIN — SODIUM CHLORIDE 507 MILLILITER(S): 9 INJECTION, SOLUTION INTRAVENOUS at 11:57

## 2025-01-13 RX ADMIN — CISPLATIN 47 MILLIGRAM(S): 1 INJECTION INTRAVENOUS at 10:13

## 2025-01-13 RX ADMIN — SODIUM CHLORIDE 10 MILLILITER(S): 9 INJECTION, SOLUTION INTRAMUSCULAR; INTRAVENOUS; SUBCUTANEOUS at 14:20

## 2025-01-13 RX ADMIN — SODIUM CHLORIDE 1000 MILLILITER(S): 9 INJECTION, SOLUTION INTRAMUSCULAR; INTRAVENOUS; SUBCUTANEOUS at 09:14

## 2025-01-13 RX ADMIN — Medication 10 MILLIGRAM(S): at 10:20

## 2025-01-13 RX ADMIN — FOSAPREPITANT DIMEGLUMINE 500 MILLIGRAM(S): 150 INJECTION, POWDER, LYOPHILIZED, FOR SOLUTION INTRAVENOUS at 09:15

## 2025-01-13 RX ADMIN — CISPLATIN 47 MILLIGRAM(S): 1 INJECTION INTRAVENOUS at 11:20

## 2025-01-23 RX ORDER — FOSAPREPITANT 150 MG/5ML
150 INJECTION, POWDER, LYOPHILIZED, FOR SOLUTION INTRAVENOUS ONCE
Refills: 0 | Status: COMPLETED | OUTPATIENT
Start: 2025-01-27 | End: 2025-01-27

## 2025-01-23 RX ORDER — DEXAMETHASONE SODIUM PHOSPHATE 4 MG/ML
10 INJECTION, SOLUTION INTRA-ARTICULAR; INTRALESIONAL; INTRAMUSCULAR; INTRAVENOUS; SOFT TISSUE ONCE
Refills: 0 | Status: COMPLETED | OUTPATIENT
Start: 2025-01-27 | End: 2025-01-27

## 2025-01-23 RX ORDER — SODIUM CHLORIDE 9 G/ML
1000 INJECTION, SOLUTION INTRAVENOUS
Refills: 0 | Status: COMPLETED | OUTPATIENT
Start: 2025-01-27 | End: 2025-01-27

## 2025-01-23 RX ORDER — GEMCITABINE 38 MG/ML
1900 INJECTION, SOLUTION INTRAVENOUS ONCE
Refills: 0 | Status: COMPLETED | OUTPATIENT
Start: 2025-01-27 | End: 2025-01-27

## 2025-01-23 RX ORDER — PALONOSETRON 0.05 MG/ML
0.25 INJECTION, SOLUTION INTRAVENOUS ONCE
Refills: 0 | Status: COMPLETED | OUTPATIENT
Start: 2025-01-27 | End: 2025-01-27

## 2025-01-23 RX ORDER — CISPLATIN 1 MG/ML
47 INJECTION, SOLUTION INTRAVENOUS ONCE
Refills: 0 | Status: COMPLETED | OUTPATIENT
Start: 2025-01-27 | End: 2025-01-27

## 2025-01-27 ENCOUNTER — APPOINTMENT (OUTPATIENT)
Dept: INFUSION THERAPY | Facility: CLINIC | Age: 70
End: 2025-01-27

## 2025-01-27 ENCOUNTER — NON-APPOINTMENT (OUTPATIENT)
Age: 70
End: 2025-01-27

## 2025-01-27 ENCOUNTER — OUTPATIENT (OUTPATIENT)
Dept: OUTPATIENT SERVICES | Facility: HOSPITAL | Age: 70
LOS: 1 days | End: 2025-01-27
Payer: MEDICARE

## 2025-01-27 ENCOUNTER — APPOINTMENT (OUTPATIENT)
Dept: HEMATOLOGY ONCOLOGY | Facility: CLINIC | Age: 70
End: 2025-01-27
Payer: MEDICARE

## 2025-01-27 VITALS
SYSTOLIC BLOOD PRESSURE: 122 MMHG | OXYGEN SATURATION: 100 % | DIASTOLIC BLOOD PRESSURE: 79 MMHG | WEIGHT: 155 LBS | HEART RATE: 62 BPM | RESPIRATION RATE: 18 BRPM | BODY MASS INDEX: 20.99 KG/M2 | HEIGHT: 72 IN | TEMPERATURE: 97.1 F

## 2025-01-27 VITALS
HEART RATE: 62 BPM | SYSTOLIC BLOOD PRESSURE: 122 MMHG | WEIGHT: 154.98 LBS | TEMPERATURE: 97 F | RESPIRATION RATE: 16 BRPM | HEIGHT: 72 IN | OXYGEN SATURATION: 100 % | DIASTOLIC BLOOD PRESSURE: 80 MMHG

## 2025-01-27 VITALS
TEMPERATURE: 97 F | OXYGEN SATURATION: 97 % | HEART RATE: 60 BPM | RESPIRATION RATE: 16 BRPM | DIASTOLIC BLOOD PRESSURE: 75 MMHG | SYSTOLIC BLOOD PRESSURE: 127 MMHG

## 2025-01-27 DIAGNOSIS — C22.1 INTRAHEPATIC BILE DUCT CARCINOMA: ICD-10-CM

## 2025-01-27 PROCEDURE — 96375 TX/PRO/DX INJ NEW DRUG ADDON: CPT

## 2025-01-27 PROCEDURE — 96413 CHEMO IV INFUSION 1 HR: CPT

## 2025-01-27 PROCEDURE — 96417 CHEMO IV INFUS EACH ADDL SEQ: CPT

## 2025-01-27 PROCEDURE — G2211 COMPLEX E/M VISIT ADD ON: CPT

## 2025-01-27 PROCEDURE — 96367 TX/PROPH/DG ADDL SEQ IV INF: CPT

## 2025-01-27 PROCEDURE — 99215 OFFICE O/P EST HI 40 MIN: CPT

## 2025-01-27 RX ORDER — LIDOCAINE HYDROCHLORIDE 30 MG/G
1 CREAM TOPICAL ONCE
Refills: 0 | Status: COMPLETED | OUTPATIENT
Start: 2025-01-27 | End: 2025-01-27

## 2025-01-27 RX ORDER — BACTERIOSTATIC SODIUM CHLORIDE 0.9 %
500 VIAL (ML) INJECTION ONCE
Refills: 0 | Status: COMPLETED | OUTPATIENT
Start: 2025-01-27 | End: 2025-01-27

## 2025-01-27 RX ORDER — DURVALUMAB 500 MG/10ML
1500 INJECTION, SOLUTION INTRAVENOUS ONCE
Refills: 0 | Status: COMPLETED | OUTPATIENT
Start: 2025-01-27 | End: 2025-01-27

## 2025-01-27 RX ORDER — BACTERIOSTATIC SODIUM CHLORIDE 0.9 %
10 VIAL (ML) INJECTION ONCE
Refills: 0 | Status: COMPLETED | OUTPATIENT
Start: 2025-01-27 | End: 2025-01-27

## 2025-01-27 RX ADMIN — SODIUM CHLORIDE 507 MILLILITER(S): 9 INJECTION, SOLUTION INTRAVENOUS at 14:46

## 2025-01-27 RX ADMIN — Medication 1000 MILLILITER(S): at 10:13

## 2025-01-27 RX ADMIN — FOSAPREPITANT 500 MILLIGRAM(S): 150 INJECTION, POWDER, LYOPHILIZED, FOR SOLUTION INTRAVENOUS at 12:17

## 2025-01-27 RX ADMIN — DEXAMETHASONE SODIUM PHOSPHATE 10 MILLIGRAM(S): 4 INJECTION, SOLUTION INTRA-ARTICULAR; INTRALESIONAL; INTRAMUSCULAR; INTRAVENOUS; SOFT TISSUE at 12:14

## 2025-01-27 RX ADMIN — GEMCITABINE 1900 MILLIGRAM(S): 38 INJECTION, SOLUTION INTRAVENOUS at 14:00

## 2025-01-27 RX ADMIN — DEXAMETHASONE SODIUM PHOSPHATE 204 MILLIGRAM(S): 4 INJECTION, SOLUTION INTRA-ARTICULAR; INTRALESIONAL; INTRAMUSCULAR; INTRAVENOUS; SOFT TISSUE at 11:59

## 2025-01-27 RX ADMIN — Medication 500 MILLILITER(S): at 10:43

## 2025-01-27 RX ADMIN — DURVALUMAB 1500 MILLIGRAM(S): 500 INJECTION, SOLUTION INTRAVENOUS at 11:57

## 2025-01-27 RX ADMIN — FOSAPREPITANT 150 MILLIGRAM(S): 150 INJECTION, POWDER, LYOPHILIZED, FOR SOLUTION INTRAVENOUS at 12:47

## 2025-01-27 RX ADMIN — CISPLATIN 47 MILLIGRAM(S): 1 INJECTION, SOLUTION INTRAVENOUS at 13:58

## 2025-01-27 RX ADMIN — PALONOSETRON 0.25 MILLIGRAM(S): 0.05 INJECTION, SOLUTION INTRAVENOUS at 11:58

## 2025-01-27 RX ADMIN — CISPLATIN 47 MILLIGRAM(S): 1 INJECTION, SOLUTION INTRAVENOUS at 12:58

## 2025-01-27 RX ADMIN — DURVALUMAB 1500 MILLIGRAM(S): 500 INJECTION, SOLUTION INTRAVENOUS at 10:45

## 2025-01-27 RX ADMIN — SODIUM CHLORIDE 1000 MILLILITER(S): 9 INJECTION, SOLUTION INTRAVENOUS at 16:38

## 2025-01-27 RX ADMIN — Medication 10 MILLILITER(S): at 09:45

## 2025-01-27 RX ADMIN — GEMCITABINE 1900 MILLIGRAM(S): 38 INJECTION, SOLUTION INTRAVENOUS at 14:30

## 2025-01-28 DIAGNOSIS — C22.1 INTRAHEPATIC BILE DUCT CARCINOMA: ICD-10-CM

## 2025-01-28 DIAGNOSIS — R16.1 SPLENOMEGALY, NOT ELSEWHERE CLASSIFIED: ICD-10-CM

## 2025-01-28 DIAGNOSIS — D45 POLYCYTHEMIA VERA: ICD-10-CM

## 2025-01-28 LAB
ALBUMIN SERPL ELPH-MCNC: 3.5 G/DL
ALP BLD-CCNC: 73 U/L
ALT SERPL-CCNC: 43 U/L
ANION GAP SERPL CALC-SCNC: 1 MMOL/L
AST SERPL-CCNC: 30 U/L
BILIRUB SERPL-MCNC: 0.7 MG/DL
BUN SERPL-MCNC: 16 MG/DL
CALCIUM SERPL-MCNC: 9.5 MG/DL
CHLORIDE SERPL-SCNC: 107 MMOL/L
CO2 SERPL-SCNC: 29 MMOL/L
CREAT SERPL-MCNC: 1.1 MG/DL
EGFR: 74 ML/MIN/1.73M2
GLUCOSE SERPL-MCNC: 96 MG/DL
HCT VFR BLD CALC: 32 %
HGB BLD-MCNC: 11.3 G/DL
LYMPHOCYTES # BLD AUTO: 1.1 K/UL
LYMPHOCYTES NFR BLD AUTO: 25.6 %
MAGNESIUM SERPL-MCNC: 2.3 MG/DL
MAN DIFF?: NO
MCHC RBC-ENTMCNC: 32.5 PG
MCHC RBC-ENTMCNC: 35.3 G/DL
MCV RBC AUTO: 92 FL
NEUTROPHILS # BLD AUTO: 2.7 K/UL
NEUTROPHILS NFR BLD AUTO: 62.5 %
PHOSPHATE SERPL-MCNC: 2.7 MG/DL
PLATELET # BLD AUTO: 335 K/UL
POTASSIUM SERPL-SCNC: 5.3 MMOL/L
PROT SERPL-MCNC: 6.9 G/DL
RBC # BLD: 3.48 M/UL
RBC # FLD: 12.3 %
SODIUM SERPL-SCNC: 137 MMOL/L
TSH SERPL-ACNC: 3.04 UIU/ML
WBC # FLD AUTO: 4.3 K/UL

## 2025-01-30 RX ORDER — LIDOCAINE HYDROCHLORIDE 30 MG/G
1 CREAM TOPICAL ONCE
Refills: 0 | Status: COMPLETED | OUTPATIENT
Start: 2025-02-03 | End: 2025-02-03

## 2025-01-30 RX ORDER — FOSAPREPITANT 150 MG/5ML
150 INJECTION, POWDER, LYOPHILIZED, FOR SOLUTION INTRAVENOUS ONCE
Refills: 0 | Status: COMPLETED | OUTPATIENT
Start: 2025-02-03 | End: 2025-02-03

## 2025-01-30 RX ORDER — CISPLATIN 1 MG/ML
47 INJECTION, SOLUTION INTRAVENOUS ONCE
Refills: 0 | Status: COMPLETED | OUTPATIENT
Start: 2025-02-03 | End: 2025-02-03

## 2025-01-30 RX ORDER — BACTERIOSTATIC SODIUM CHLORIDE 0.9 %
10 VIAL (ML) INJECTION ONCE
Refills: 0 | Status: COMPLETED | OUTPATIENT
Start: 2025-02-03 | End: 2025-02-03

## 2025-01-30 RX ORDER — SODIUM CHLORIDE 9 G/ML
1000 INJECTION, SOLUTION INTRAVENOUS
Refills: 0 | Status: COMPLETED | OUTPATIENT
Start: 2025-02-03 | End: 2025-02-03

## 2025-01-30 RX ORDER — GEMCITABINE 38 MG/ML
1900 INJECTION, SOLUTION INTRAVENOUS ONCE
Refills: 0 | Status: COMPLETED | OUTPATIENT
Start: 2025-02-03 | End: 2025-02-03

## 2025-01-30 RX ORDER — PALONOSETRON 0.05 MG/ML
0.25 INJECTION, SOLUTION INTRAVENOUS ONCE
Refills: 0 | Status: COMPLETED | OUTPATIENT
Start: 2025-02-03 | End: 2025-02-03

## 2025-02-03 ENCOUNTER — OUTPATIENT (OUTPATIENT)
Dept: OUTPATIENT SERVICES | Facility: HOSPITAL | Age: 70
LOS: 1 days | End: 2025-02-03
Payer: MEDICARE

## 2025-02-03 ENCOUNTER — APPOINTMENT (OUTPATIENT)
Dept: INFUSION THERAPY | Facility: CLINIC | Age: 70
End: 2025-02-03

## 2025-02-03 VITALS
HEART RATE: 62 BPM | WEIGHT: 158.07 LBS | TEMPERATURE: 98 F | HEIGHT: 72 IN | DIASTOLIC BLOOD PRESSURE: 76 MMHG | SYSTOLIC BLOOD PRESSURE: 118 MMHG | RESPIRATION RATE: 17 BRPM | OXYGEN SATURATION: 100 %

## 2025-02-03 DIAGNOSIS — C22.1 INTRAHEPATIC BILE DUCT CARCINOMA: ICD-10-CM

## 2025-02-03 LAB
ALBUMIN SERPL ELPH-MCNC: 3.4 G/DL — SIGNIFICANT CHANGE UP (ref 3.3–5)
ALP SERPL-CCNC: 70 U/L — SIGNIFICANT CHANGE UP (ref 40–120)
ALT FLD-CCNC: 69 U/L — HIGH (ref 10–45)
ANION GAP SERPL CALC-SCNC: 0 MMOL/L — LOW (ref 5–17)
AST SERPL-CCNC: 45 U/L — HIGH (ref 10–40)
BILIRUB SERPL-MCNC: 0.6 MG/DL — SIGNIFICANT CHANGE UP (ref 0.2–1.2)
BUN SERPL-MCNC: 17 MG/DL — SIGNIFICANT CHANGE UP (ref 7–23)
CALCIUM SERPL-MCNC: 9.1 MG/DL — SIGNIFICANT CHANGE UP (ref 8.4–10.5)
CHLORIDE SERPL-SCNC: 109 MMOL/L — HIGH (ref 96–108)
CO2 SERPL-SCNC: 29 MMOL/L — SIGNIFICANT CHANGE UP (ref 22–31)
CREAT SERPL-MCNC: 1.1 MG/DL — SIGNIFICANT CHANGE UP (ref 0.5–1.3)
EGFR: 74 ML/MIN/1.73M2 — SIGNIFICANT CHANGE UP
GLUCOSE SERPL-MCNC: 105 MG/DL — HIGH (ref 70–99)
HCT VFR BLD CALC: 30 % — LOW (ref 39–50)
HGB BLD-MCNC: 10.5 G/DL — LOW (ref 13–17)
LYMPHOCYTES # BLD AUTO: 1 K/UL — SIGNIFICANT CHANGE UP (ref 1–3.3)
LYMPHOCYTES # BLD AUTO: 38.5 % — SIGNIFICANT CHANGE UP (ref 13–44)
MAGNESIUM SERPL-MCNC: 2 MG/DL — SIGNIFICANT CHANGE UP (ref 1.6–2.6)
MCHC RBC-ENTMCNC: 31.8 PG — SIGNIFICANT CHANGE UP (ref 27–34)
MCHC RBC-ENTMCNC: 35 G/DL — SIGNIFICANT CHANGE UP (ref 32–36)
MCV RBC AUTO: 90.9 FL — SIGNIFICANT CHANGE UP (ref 80–100)
NEUTROPHILS # BLD AUTO: 1.3 K/UL — LOW (ref 1.8–7.4)
NEUTROPHILS NFR BLD AUTO: 52.7 % — SIGNIFICANT CHANGE UP (ref 43–77)
PLATELET # BLD AUTO: 222 K/UL — SIGNIFICANT CHANGE UP (ref 150–400)
POTASSIUM SERPL-MCNC: 4.8 MMOL/L — SIGNIFICANT CHANGE UP (ref 3.5–5.3)
POTASSIUM SERPL-SCNC: 4.8 MMOL/L — SIGNIFICANT CHANGE UP (ref 3.5–5.3)
PROT SERPL-MCNC: 6.5 G/DL — SIGNIFICANT CHANGE UP (ref 6–8.3)
RBC # BLD: 3.3 M/UL — LOW (ref 4.2–5.8)
RBC # FLD: 12 % — SIGNIFICANT CHANGE UP (ref 10.3–14.5)
SODIUM SERPL-SCNC: 138 MMOL/L — SIGNIFICANT CHANGE UP (ref 135–145)
WBC # BLD: 2.5 K/UL — LOW (ref 3.8–10.5)
WBC # FLD AUTO: 2.5 K/UL — LOW (ref 3.8–10.5)

## 2025-02-03 PROCEDURE — 83735 ASSAY OF MAGNESIUM: CPT

## 2025-02-03 PROCEDURE — 96367 TX/PROPH/DG ADDL SEQ IV INF: CPT

## 2025-02-03 PROCEDURE — 96413 CHEMO IV INFUSION 1 HR: CPT

## 2025-02-03 PROCEDURE — 96417 CHEMO IV INFUS EACH ADDL SEQ: CPT

## 2025-02-03 PROCEDURE — 96375 TX/PRO/DX INJ NEW DRUG ADDON: CPT

## 2025-02-03 PROCEDURE — 80053 COMPREHEN METABOLIC PANEL: CPT

## 2025-02-03 PROCEDURE — 85025 COMPLETE CBC W/AUTO DIFF WBC: CPT

## 2025-02-03 PROCEDURE — 36415 COLL VENOUS BLD VENIPUNCTURE: CPT

## 2025-02-03 RX ORDER — BACTERIOSTATIC SODIUM CHLORIDE 0.9 %
500 VIAL (ML) INJECTION ONCE
Refills: 0 | Status: COMPLETED | OUTPATIENT
Start: 2025-02-03 | End: 2025-02-03

## 2025-02-03 RX ORDER — DEXAMETHASONE SODIUM PHOSPHATE 4 MG/ML
10 INJECTION, SOLUTION INTRA-ARTICULAR; INTRALESIONAL; INTRAMUSCULAR; INTRAVENOUS; SOFT TISSUE ONCE
Refills: 0 | Status: COMPLETED | OUTPATIENT
Start: 2025-02-03 | End: 2025-02-03

## 2025-02-03 RX ADMIN — SODIUM CHLORIDE 1000 MILLILITER(S): 9 INJECTION, SOLUTION INTRAVENOUS at 14:59

## 2025-02-03 RX ADMIN — LIDOCAINE HYDROCHLORIDE 1 APPLICATION(S): 30 CREAM TOPICAL at 08:00

## 2025-02-03 RX ADMIN — DEXAMETHASONE SODIUM PHOSPHATE 10 MILLIGRAM(S): 4 INJECTION, SOLUTION INTRA-ARTICULAR; INTRALESIONAL; INTRAMUSCULAR; INTRAVENOUS; SOFT TISSUE at 11:15

## 2025-02-03 RX ADMIN — FOSAPREPITANT 500 MILLIGRAM(S): 150 INJECTION, POWDER, LYOPHILIZED, FOR SOLUTION INTRAVENOUS at 11:35

## 2025-02-03 RX ADMIN — SODIUM CHLORIDE 507 MILLILITER(S): 9 INJECTION, SOLUTION INTRAVENOUS at 13:56

## 2025-02-03 RX ADMIN — FOSAPREPITANT 150 MILLIGRAM(S): 150 INJECTION, POWDER, LYOPHILIZED, FOR SOLUTION INTRAVENOUS at 12:10

## 2025-02-03 RX ADMIN — PALONOSETRON 0.25 MILLIGRAM(S): 0.05 INJECTION, SOLUTION INTRAVENOUS at 12:00

## 2025-02-03 RX ADMIN — GEMCITABINE 1900 MILLIGRAM(S): 38 INJECTION, SOLUTION INTRAVENOUS at 18:02

## 2025-02-03 RX ADMIN — Medication 500 MILLILITER(S): at 10:50

## 2025-02-03 RX ADMIN — CISPLATIN 47 MILLIGRAM(S): 1 INJECTION, SOLUTION INTRAVENOUS at 12:53

## 2025-02-03 RX ADMIN — DEXAMETHASONE SODIUM PHOSPHATE 204 MILLIGRAM(S): 4 INJECTION, SOLUTION INTRA-ARTICULAR; INTRALESIONAL; INTRAMUSCULAR; INTRAVENOUS; SOFT TISSUE at 10:50

## 2025-02-03 RX ADMIN — GEMCITABINE 1900 MILLIGRAM(S): 38 INJECTION, SOLUTION INTRAVENOUS at 11:57

## 2025-02-03 RX ADMIN — Medication 1000 MILLILITER(S): at 10:20

## 2025-02-03 RX ADMIN — Medication 10 MILLILITER(S): at 15:45

## 2025-02-03 RX ADMIN — CISPLATIN 47 MILLIGRAM(S): 1 INJECTION, SOLUTION INTRAVENOUS at 11:53

## 2025-02-12 RX ORDER — PALONOSETRON HYDROCHLORIDE 0.05 MG/ML
0.25 INJECTION, SOLUTION INTRAVENOUS ONCE
Refills: 0 | Status: COMPLETED | OUTPATIENT
Start: 2025-02-18 | End: 2025-02-18

## 2025-02-12 RX ORDER — DEXAMETHASONE 0.5 MG/1
10 TABLET ORAL ONCE
Refills: 0 | Status: COMPLETED | OUTPATIENT
Start: 2025-02-18 | End: 2025-02-18

## 2025-02-12 RX ORDER — FOSAPREPITANT 150 MG/5ML
150 INJECTION, POWDER, LYOPHILIZED, FOR SOLUTION INTRAVENOUS ONCE
Refills: 0 | Status: COMPLETED | OUTPATIENT
Start: 2025-02-18 | End: 2025-02-18

## 2025-02-12 RX ORDER — DURVALUMAB 500 MG/10ML
1500 INJECTION, SOLUTION INTRAVENOUS ONCE
Refills: 0 | Status: COMPLETED | OUTPATIENT
Start: 2025-02-18 | End: 2025-02-18

## 2025-02-12 RX ORDER — LIDOCAINE HYDROCHLORIDE 20 MG/ML
1 JELLY TOPICAL ONCE
Refills: 0 | Status: COMPLETED | OUTPATIENT
Start: 2025-02-18 | End: 2025-02-25

## 2025-02-12 RX ORDER — SODIUM CHLORIDE 9 G/1000ML
1000 INJECTION, SOLUTION INTRAVENOUS
Refills: 0 | Status: COMPLETED | OUTPATIENT
Start: 2025-02-18 | End: 2025-02-18

## 2025-02-12 RX ORDER — CISPLATIN 1 MG/ML
47 INJECTION INTRAVENOUS ONCE
Refills: 0 | Status: COMPLETED | OUTPATIENT
Start: 2025-02-18 | End: 2025-02-18

## 2025-02-12 RX ORDER — GEMCITABINE HYDROCHLORIDE 38 MG/ML
1900 INJECTION, SOLUTION INTRAVENOUS ONCE
Refills: 0 | Status: COMPLETED | OUTPATIENT
Start: 2025-02-18 | End: 2025-02-18

## 2025-02-13 ENCOUNTER — OUTPATIENT (OUTPATIENT)
Dept: OUTPATIENT SERVICES | Facility: HOSPITAL | Age: 70
LOS: 1 days | End: 2025-02-13
Payer: MEDICARE

## 2025-02-13 PROCEDURE — 74183 MRI ABD W/O CNTR FLWD CNTR: CPT

## 2025-02-13 PROCEDURE — 74183 MRI ABD W/O CNTR FLWD CNTR: CPT | Mod: 26

## 2025-02-13 PROCEDURE — A9581: CPT

## 2025-02-15 ENCOUNTER — APPOINTMENT (OUTPATIENT)
Dept: MRI IMAGING | Facility: HOSPITAL | Age: 70
End: 2025-02-15

## 2025-02-18 ENCOUNTER — OUTPATIENT (OUTPATIENT)
Dept: OUTPATIENT SERVICES | Facility: HOSPITAL | Age: 70
LOS: 1 days | End: 2025-02-18
Payer: MEDICARE

## 2025-02-18 ENCOUNTER — APPOINTMENT (OUTPATIENT)
Dept: INFUSION THERAPY | Facility: CLINIC | Age: 70
End: 2025-02-18

## 2025-02-18 ENCOUNTER — APPOINTMENT (OUTPATIENT)
Dept: HEMATOLOGY ONCOLOGY | Facility: CLINIC | Age: 70
End: 2025-02-18
Payer: MEDICARE

## 2025-02-18 VITALS
OXYGEN SATURATION: 99 % | WEIGHT: 158 LBS | SYSTOLIC BLOOD PRESSURE: 143 MMHG | HEIGHT: 72 IN | TEMPERATURE: 97.7 F | BODY MASS INDEX: 21.4 KG/M2 | DIASTOLIC BLOOD PRESSURE: 83 MMHG | HEART RATE: 61 BPM | RESPIRATION RATE: 18 BRPM

## 2025-02-18 VITALS
RESPIRATION RATE: 18 BRPM | TEMPERATURE: 98 F | DIASTOLIC BLOOD PRESSURE: 70 MMHG | OXYGEN SATURATION: 100 % | SYSTOLIC BLOOD PRESSURE: 120 MMHG | HEART RATE: 70 BPM

## 2025-02-18 VITALS
SYSTOLIC BLOOD PRESSURE: 130 MMHG | HEIGHT: 71 IN | OXYGEN SATURATION: 100 % | TEMPERATURE: 97 F | HEART RATE: 57 BPM | RESPIRATION RATE: 18 BRPM | WEIGHT: 156.09 LBS | DIASTOLIC BLOOD PRESSURE: 81 MMHG

## 2025-02-18 DIAGNOSIS — C22.1 INTRAHEPATIC BILE DUCT CARCINOMA: ICD-10-CM

## 2025-02-18 PROCEDURE — 96367 TX/PROPH/DG ADDL SEQ IV INF: CPT

## 2025-02-18 PROCEDURE — G2211 COMPLEX E/M VISIT ADD ON: CPT

## 2025-02-18 PROCEDURE — 96417 CHEMO IV INFUS EACH ADDL SEQ: CPT

## 2025-02-18 PROCEDURE — 96375 TX/PRO/DX INJ NEW DRUG ADDON: CPT

## 2025-02-18 PROCEDURE — 96413 CHEMO IV INFUSION 1 HR: CPT

## 2025-02-18 PROCEDURE — 99215 OFFICE O/P EST HI 40 MIN: CPT

## 2025-02-18 RX ADMIN — SODIUM CHLORIDE 507 MILLILITER(S): 9 INJECTION, SOLUTION INTRAVENOUS at 15:22

## 2025-02-18 RX ADMIN — Medication 500 MILLILITER(S): at 11:10

## 2025-02-18 RX ADMIN — SODIUM CHLORIDE 1000 MILLILITER(S): 9 INJECTION, SOLUTION INTRAVENOUS at 17:22

## 2025-02-18 RX ADMIN — FOSAPREPITANT 150 MILLIGRAM(S): 150 INJECTION, POWDER, LYOPHILIZED, FOR SOLUTION INTRAVENOUS at 12:45

## 2025-02-18 RX ADMIN — FOSAPREPITANT 500 MILLIGRAM(S): 150 INJECTION, POWDER, LYOPHILIZED, FOR SOLUTION INTRAVENOUS at 12:29

## 2025-02-18 RX ADMIN — Medication 1000 MILLILITER(S): at 10:40

## 2025-02-18 RX ADMIN — GEMCITABINE HYDROCHLORIDE 1900 MILLIGRAM(S): 38 INJECTION, SOLUTION INTRAVENOUS at 14:31

## 2025-02-18 RX ADMIN — DEXAMETHASONE 10 MILLIGRAM(S): 0.5 TABLET ORAL at 13:35

## 2025-02-18 RX ADMIN — CISPLATIN 47 MILLIGRAM(S): 1 INJECTION INTRAVENOUS at 13:39

## 2025-02-18 RX ADMIN — GEMCITABINE HYDROCHLORIDE 1900 MILLIGRAM(S): 38 INJECTION, SOLUTION INTRAVENOUS at 13:41

## 2025-02-18 RX ADMIN — DEXAMETHASONE 204 MILLIGRAM(S): 0.5 TABLET ORAL at 13:20

## 2025-02-18 RX ADMIN — CISPLATIN 47 MILLIGRAM(S): 1 INJECTION INTRAVENOUS at 14:39

## 2025-02-18 RX ADMIN — PALONOSETRON HYDROCHLORIDE 0.25 MILLIGRAM(S): 0.05 INJECTION, SOLUTION INTRAVENOUS at 12:21

## 2025-02-18 RX ADMIN — DURVALUMAB 1500 MILLIGRAM(S): 500 INJECTION, SOLUTION INTRAVENOUS at 11:11

## 2025-02-18 RX ADMIN — DURVALUMAB 1500 MILLIGRAM(S): 500 INJECTION, SOLUTION INTRAVENOUS at 12:11

## 2025-02-20 LAB
ALBUMIN SERPL ELPH-MCNC: 3.5 G/DL
ALP BLD-CCNC: 82 U/L
ALT SERPL-CCNC: 30 U/L
ANION GAP SERPL CALC-SCNC: -1 MMOL/L
AST SERPL-CCNC: 29 U/L
BILIRUB SERPL-MCNC: 0.7 MG/DL
BUN SERPL-MCNC: 16 MG/DL
CALCIUM SERPL-MCNC: 9.1 MG/DL
CHLORIDE SERPL-SCNC: 110 MMOL/L
CO2 SERPL-SCNC: 26 MMOL/L
CREAT SERPL-MCNC: 1.1 MG/DL
EGFR: 74 ML/MIN/1.73M2
GLUCOSE SERPL-MCNC: 97 MG/DL
HCT VFR BLD CALC: 30.3 %
HGB BLD-MCNC: 10.8 G/DL
LYMPHOCYTES # BLD AUTO: 1.1 K/UL
LYMPHOCYTES NFR BLD AUTO: 22.6 %
MAGNESIUM SERPL-MCNC: 2.2 MG/DL
MAN DIFF?: NO
MCHC RBC-ENTMCNC: 32.7 PG
MCHC RBC-ENTMCNC: 35.6 G/DL
MCV RBC AUTO: 91.8 FL
NEUTROPHILS # BLD AUTO: 3 K/UL
NEUTROPHILS NFR BLD AUTO: 62 %
PHOSPHATE SERPL-MCNC: 3.9 MG/DL
PLATELET # BLD AUTO: 303 K/UL
POTASSIUM SERPL-SCNC: 4.8 MMOL/L
PROT SERPL-MCNC: 6.8 G/DL
RBC # BLD: 3.3 M/UL
RBC # FLD: 13.4 %
SODIUM SERPL-SCNC: 135 MMOL/L
WBC # FLD AUTO: 4.9 K/UL

## 2025-02-21 ENCOUNTER — APPOINTMENT (OUTPATIENT)
Dept: SURGICAL ONCOLOGY | Facility: CLINIC | Age: 70
End: 2025-02-21

## 2025-02-21 VITALS
OXYGEN SATURATION: 99 % | HEART RATE: 59 BPM | HEIGHT: 72 IN | SYSTOLIC BLOOD PRESSURE: 140 MMHG | DIASTOLIC BLOOD PRESSURE: 70 MMHG | WEIGHT: 155 LBS | BODY MASS INDEX: 20.99 KG/M2

## 2025-02-21 DIAGNOSIS — C22.1 INTRAHEPATIC BILE DUCT CARCINOMA: ICD-10-CM

## 2025-02-21 PROCEDURE — 99215 OFFICE O/P EST HI 40 MIN: CPT

## 2025-02-21 RX ORDER — SODIUM CHLORIDE 9 G/1000ML
1000 INJECTION, SOLUTION INTRAVENOUS
Refills: 0 | Status: COMPLETED | OUTPATIENT
Start: 2025-02-25 | End: 2025-02-25

## 2025-02-25 ENCOUNTER — OUTPATIENT (OUTPATIENT)
Dept: OUTPATIENT SERVICES | Facility: HOSPITAL | Age: 70
LOS: 1 days | End: 2025-02-25
Payer: MEDICARE

## 2025-02-25 ENCOUNTER — APPOINTMENT (OUTPATIENT)
Dept: INFUSION THERAPY | Facility: CLINIC | Age: 70
End: 2025-02-25

## 2025-02-25 ENCOUNTER — NON-APPOINTMENT (OUTPATIENT)
Age: 70
End: 2025-02-25

## 2025-02-25 VITALS
HEART RATE: 58 BPM | HEIGHT: 72 IN | SYSTOLIC BLOOD PRESSURE: 120 MMHG | RESPIRATION RATE: 16 BRPM | WEIGHT: 156.97 LBS | DIASTOLIC BLOOD PRESSURE: 72 MMHG | TEMPERATURE: 97 F | OXYGEN SATURATION: 100 %

## 2025-02-25 VITALS
SYSTOLIC BLOOD PRESSURE: 112 MMHG | RESPIRATION RATE: 16 BRPM | DIASTOLIC BLOOD PRESSURE: 73 MMHG | TEMPERATURE: 97 F | HEART RATE: 65 BPM | OXYGEN SATURATION: 100 %

## 2025-02-25 DIAGNOSIS — C22.1 INTRAHEPATIC BILE DUCT CARCINOMA: ICD-10-CM

## 2025-02-25 LAB
ALBUMIN SERPL ELPH-MCNC: 3.5 G/DL — SIGNIFICANT CHANGE UP (ref 3.3–5)
ALP SERPL-CCNC: 80 U/L — SIGNIFICANT CHANGE UP (ref 40–120)
ALT FLD-CCNC: 36 U/L — SIGNIFICANT CHANGE UP (ref 10–45)
ANION GAP SERPL CALC-SCNC: -2 MMOL/L — LOW (ref 5–17)
AST SERPL-CCNC: 31 U/L — SIGNIFICANT CHANGE UP (ref 10–40)
BILIRUB SERPL-MCNC: 0.7 MG/DL — SIGNIFICANT CHANGE UP (ref 0.2–1.2)
BUN SERPL-MCNC: 17 MG/DL — SIGNIFICANT CHANGE UP (ref 7–23)
CALCIUM SERPL-MCNC: 9.1 MG/DL — SIGNIFICANT CHANGE UP (ref 8.4–10.5)
CHLORIDE SERPL-SCNC: 105 MMOL/L — SIGNIFICANT CHANGE UP (ref 96–108)
CO2 SERPL-SCNC: 29 MMOL/L — SIGNIFICANT CHANGE UP (ref 22–31)
CREAT SERPL-MCNC: 0.9 MG/DL — SIGNIFICANT CHANGE UP (ref 0.5–1.3)
EGFR: 94 ML/MIN/1.73M2 — SIGNIFICANT CHANGE UP
GLUCOSE SERPL-MCNC: 105 MG/DL — HIGH (ref 70–99)
HCT VFR BLD CALC: 28.9 % — LOW (ref 39–50)
HGB BLD-MCNC: 10.2 G/DL — LOW (ref 13–17)
LYMPHOCYTES # BLD AUTO: 1 K/UL — SIGNIFICANT CHANGE UP (ref 1–3.3)
LYMPHOCYTES # BLD AUTO: 37.1 % — SIGNIFICANT CHANGE UP (ref 13–44)
MAGNESIUM SERPL-MCNC: 2.1 MG/DL — SIGNIFICANT CHANGE UP (ref 1.6–2.6)
MCHC RBC-ENTMCNC: 32.4 PG — SIGNIFICANT CHANGE UP (ref 27–34)
MCHC RBC-ENTMCNC: 35.3 G/DL — SIGNIFICANT CHANGE UP (ref 32–36)
MCV RBC AUTO: 91.7 FL — SIGNIFICANT CHANGE UP (ref 80–100)
NEUTROPHILS # BLD AUTO: 1.6 K/UL — LOW (ref 1.8–7.4)
NEUTROPHILS NFR BLD AUTO: 56.4 % — SIGNIFICANT CHANGE UP (ref 43–77)
PLATELET # BLD AUTO: 181 K/UL — SIGNIFICANT CHANGE UP (ref 150–400)
POTASSIUM SERPL-MCNC: 5.1 MMOL/L — SIGNIFICANT CHANGE UP (ref 3.5–5.3)
POTASSIUM SERPL-SCNC: 5.1 MMOL/L — SIGNIFICANT CHANGE UP (ref 3.5–5.3)
PROT SERPL-MCNC: 6.8 G/DL — SIGNIFICANT CHANGE UP (ref 6–8.3)
RBC # BLD: 3.15 M/UL — LOW (ref 4.2–5.8)
RBC # FLD: 13 % — SIGNIFICANT CHANGE UP (ref 10.3–14.5)
SODIUM SERPL-SCNC: 132 MMOL/L — LOW (ref 135–145)
WBC # BLD: 2.8 K/UL — LOW (ref 3.8–10.5)
WBC # FLD AUTO: 2.8 K/UL — LOW (ref 3.8–10.5)

## 2025-02-25 PROCEDURE — 36415 COLL VENOUS BLD VENIPUNCTURE: CPT

## 2025-02-25 PROCEDURE — 96413 CHEMO IV INFUSION 1 HR: CPT

## 2025-02-25 PROCEDURE — 85025 COMPLETE CBC W/AUTO DIFF WBC: CPT

## 2025-02-25 PROCEDURE — 80053 COMPREHEN METABOLIC PANEL: CPT

## 2025-02-25 PROCEDURE — 83735 ASSAY OF MAGNESIUM: CPT

## 2025-02-25 PROCEDURE — 96375 TX/PRO/DX INJ NEW DRUG ADDON: CPT

## 2025-02-25 PROCEDURE — 96367 TX/PROPH/DG ADDL SEQ IV INF: CPT

## 2025-02-25 RX ORDER — CISPLATIN 1 MG/ML
47 INJECTION INTRAVENOUS ONCE
Refills: 0 | Status: COMPLETED | OUTPATIENT
Start: 2025-02-25 | End: 2025-02-25

## 2025-02-25 RX ORDER — FOSAPREPITANT 150 MG/5ML
150 INJECTION, POWDER, LYOPHILIZED, FOR SOLUTION INTRAVENOUS ONCE
Refills: 0 | Status: COMPLETED | OUTPATIENT
Start: 2025-02-25 | End: 2025-02-25

## 2025-02-25 RX ORDER — PALONOSETRON HYDROCHLORIDE 0.05 MG/ML
0.25 INJECTION, SOLUTION INTRAVENOUS ONCE
Refills: 0 | Status: COMPLETED | OUTPATIENT
Start: 2025-02-25 | End: 2025-02-25

## 2025-02-25 RX ORDER — DEXAMETHASONE 0.5 MG/1
10 TABLET ORAL ONCE
Refills: 0 | Status: COMPLETED | OUTPATIENT
Start: 2025-02-25 | End: 2025-02-25

## 2025-02-25 RX ORDER — GEMCITABINE HYDROCHLORIDE 38 MG/ML
1900 INJECTION, SOLUTION INTRAVENOUS ONCE
Refills: 0 | Status: COMPLETED | OUTPATIENT
Start: 2025-02-25 | End: 2025-02-25

## 2025-02-25 RX ORDER — LIDOCAINE HYDROCHLORIDE 20 MG/ML
1 JELLY TOPICAL ONCE
Refills: 0 | Status: COMPLETED | OUTPATIENT
Start: 2025-02-25 | End: 2025-02-25

## 2025-02-25 RX ADMIN — LIDOCAINE HYDROCHLORIDE 1 APPLICATION(S): 20 JELLY TOPICAL at 08:25

## 2025-02-25 RX ADMIN — Medication 500 MILLILITER(S): at 10:03

## 2025-02-25 RX ADMIN — SODIUM CHLORIDE 1000 MILLILITER(S): 9 INJECTION, SOLUTION INTRAVENOUS at 13:46

## 2025-02-25 RX ADMIN — DEXAMETHASONE 204 MILLIGRAM(S): 0.5 TABLET ORAL at 10:04

## 2025-02-25 RX ADMIN — CISPLATIN 47 MILLIGRAM(S): 1 INJECTION INTRAVENOUS at 10:21

## 2025-02-25 RX ADMIN — DEXAMETHASONE 10 MILLIGRAM(S): 0.5 TABLET ORAL at 10:20

## 2025-02-25 RX ADMIN — Medication 1000 MILLILITER(S): at 09:03

## 2025-02-25 RX ADMIN — Medication 10 MILLILITER(S): at 13:46

## 2025-02-25 RX ADMIN — CISPLATIN 47 MILLIGRAM(S): 1 INJECTION INTRAVENOUS at 11:21

## 2025-02-25 RX ADMIN — SODIUM CHLORIDE 507 MILLILITER(S): 9 INJECTION, SOLUTION INTRAVENOUS at 12:01

## 2025-02-25 RX ADMIN — PALONOSETRON HYDROCHLORIDE 0.25 MILLIGRAM(S): 0.05 INJECTION, SOLUTION INTRAVENOUS at 09:03

## 2025-02-25 RX ADMIN — FOSAPREPITANT 150 MILLIGRAM(S): 150 INJECTION, POWDER, LYOPHILIZED, FOR SOLUTION INTRAVENOUS at 09:35

## 2025-02-25 RX ADMIN — FOSAPREPITANT 500 MILLIGRAM(S): 150 INJECTION, POWDER, LYOPHILIZED, FOR SOLUTION INTRAVENOUS at 09:05

## 2025-02-25 RX ADMIN — GEMCITABINE HYDROCHLORIDE 1900 MILLIGRAM(S): 38 INJECTION, SOLUTION INTRAVENOUS at 11:22

## 2025-02-25 RX ADMIN — GEMCITABINE HYDROCHLORIDE 1900 MILLIGRAM(S): 38 INJECTION, SOLUTION INTRAVENOUS at 11:52

## 2025-02-25 NOTE — DISCHARGE INSTRUCTIONS: CHEMOTHERAPY - NSAPPTSF-UPMEETHOTHER_FS
completed third cycle  ml cisplatin gemcitabine NS 1 L with 20 MEQ potassium 2 gm magnesium sulfate. Follow up with Dr. Hancock

## 2025-03-02 ENCOUNTER — OUTPATIENT (OUTPATIENT)
Dept: OUTPATIENT SERVICES | Facility: HOSPITAL | Age: 70
LOS: 1 days | End: 2025-03-02
Payer: MEDICARE

## 2025-03-02 PROCEDURE — 74174 CTA ABD&PLVS W/CONTRAST: CPT | Mod: 26

## 2025-03-05 ENCOUNTER — APPOINTMENT (OUTPATIENT)
Dept: HEMATOLOGY ONCOLOGY | Facility: CLINIC | Age: 70
End: 2025-03-05

## 2025-03-10 ENCOUNTER — APPOINTMENT (OUTPATIENT)
Dept: HEMATOLOGY ONCOLOGY | Facility: CLINIC | Age: 70
End: 2025-03-10
Payer: MEDICARE

## 2025-03-10 VITALS
TEMPERATURE: 97.6 F | WEIGHT: 160 LBS | HEART RATE: 55 BPM | HEIGHT: 72 IN | BODY MASS INDEX: 21.67 KG/M2 | OXYGEN SATURATION: 100 % | DIASTOLIC BLOOD PRESSURE: 81 MMHG | RESPIRATION RATE: 18 BRPM | SYSTOLIC BLOOD PRESSURE: 138 MMHG

## 2025-03-10 LAB
ALBUMIN SERPL ELPH-MCNC: 3.5 G/DL
ALBUMIN SERPL ELPH-MCNC: 3.5 G/DL
ALP BLD-CCNC: 87 U/L
ALP BLD-CCNC: 90 U/L
ALT SERPL-CCNC: 25 U/L
ALT SERPL-CCNC: 26 U/L
ANION GAP SERPL CALC-SCNC: 0 MMOL/L
ANION GAP SERPL CALC-SCNC: 3 MMOL/L
AST SERPL-CCNC: 23 U/L
AST SERPL-CCNC: 26 U/L
BILIRUB SERPL-MCNC: 0.7 MG/DL
BILIRUB SERPL-MCNC: 0.7 MG/DL
BUN SERPL-MCNC: 15 MG/DL
BUN SERPL-MCNC: 18 MG/DL
CALCIUM SERPL-MCNC: 9 MG/DL
CALCIUM SERPL-MCNC: 9.5 MG/DL
CHLORIDE SERPL-SCNC: 106 MMOL/L
CHLORIDE SERPL-SCNC: 108 MMOL/L
CO2 SERPL-SCNC: 28 MMOL/L
CO2 SERPL-SCNC: 29 MMOL/L
CREAT SERPL-MCNC: 1.1 MG/DL
CREAT SERPL-MCNC: 1.2 MG/DL
EGFRCR SERPLBLD CKD-EPI 2021: 66 ML/MIN/1.73M2
EGFRCR SERPLBLD CKD-EPI 2021: 74 ML/MIN/1.73M2
GLUCOSE SERPL-MCNC: 100 MG/DL
GLUCOSE SERPL-MCNC: 100 MG/DL
HCT VFR BLD CALC: 27.8 %
HCT VFR BLD CALC: 27.9 %
HGB BLD-MCNC: 9.7 G/DL
HGB BLD-MCNC: 9.7 G/DL
LYMPHOCYTES # BLD AUTO: 0.9 K/UL
LYMPHOCYTES # BLD AUTO: 1.2 K/UL
LYMPHOCYTES NFR BLD AUTO: 20 %
LYMPHOCYTES NFR BLD AUTO: 20.2 %
MAGNESIUM SERPL-MCNC: 2.1 MG/DL
MAGNESIUM SERPL-MCNC: 2.2 MG/DL
MAN DIFF?: NO
MAN DIFF?: NO
MCHC RBC-ENTMCNC: 32.2 PG
MCHC RBC-ENTMCNC: 32.4 PG
MCHC RBC-ENTMCNC: 34.8 G/DL
MCHC RBC-ENTMCNC: 34.9 G/DL
MCV RBC AUTO: 92.4 FL
MCV RBC AUTO: 93.3 FL
NEUTROPHILS # BLD AUTO: 3.2 K/UL
NEUTROPHILS # BLD AUTO: 4.2 K/UL
NEUTROPHILS NFR BLD AUTO: 66 %
NEUTROPHILS NFR BLD AUTO: 73.9 %
PHOSPHATE SERPL-MCNC: 3 MG/DL
PHOSPHATE SERPL-MCNC: 3.5 MG/DL
PLATELET # BLD AUTO: 172 K/UL
PLATELET # BLD AUTO: 41 K/UL
POTASSIUM SERPL-SCNC: 4.9 MMOL/L
POTASSIUM SERPL-SCNC: 5.4 MMOL/L
PROT SERPL-MCNC: 6.7 G/DL
PROT SERPL-MCNC: 6.9 G/DL
RBC # BLD: 2.99 M/UL
RBC # BLD: 3.01 M/UL
RBC # FLD: 13.3 %
RBC # FLD: 14.5 %
SODIUM SERPL-SCNC: 137 MMOL/L
SODIUM SERPL-SCNC: 137 MMOL/L
WBC # FLD AUTO: 4.7 K/UL
WBC # FLD AUTO: 5.8 K/UL

## 2025-03-10 PROCEDURE — 99215 OFFICE O/P EST HI 40 MIN: CPT

## 2025-03-10 PROCEDURE — 74174 CTA ABD&PLVS W/CONTRAST: CPT | Mod: 26

## 2025-03-10 PROCEDURE — 74174 CTA ABD&PLVS W/CONTRAST: CPT

## 2025-03-10 PROCEDURE — G2211 COMPLEX E/M VISIT ADD ON: CPT

## 2025-03-10 PROCEDURE — 82565 ASSAY OF CREATININE: CPT

## 2025-03-11 ENCOUNTER — OUTPATIENT (OUTPATIENT)
Dept: OUTPATIENT SERVICES | Facility: HOSPITAL | Age: 70
LOS: 1 days | End: 2025-03-11

## 2025-03-11 VITALS
HEART RATE: 63 BPM | SYSTOLIC BLOOD PRESSURE: 122 MMHG | WEIGHT: 156.97 LBS | HEIGHT: 73 IN | TEMPERATURE: 98 F | RESPIRATION RATE: 16 BRPM | OXYGEN SATURATION: 100 % | DIASTOLIC BLOOD PRESSURE: 76 MMHG

## 2025-03-11 DIAGNOSIS — Z98.890 OTHER SPECIFIED POSTPROCEDURAL STATES: Chronic | ICD-10-CM

## 2025-03-11 DIAGNOSIS — Z92.3 PERSONAL HISTORY OF IRRADIATION: Chronic | ICD-10-CM

## 2025-03-11 DIAGNOSIS — Z92.21 PERSONAL HISTORY OF ANTINEOPLASTIC CHEMOTHERAPY: Chronic | ICD-10-CM

## 2025-03-11 DIAGNOSIS — J45.909 UNSPECIFIED ASTHMA, UNCOMPLICATED: ICD-10-CM

## 2025-03-11 DIAGNOSIS — Z91.89 OTHER SPECIFIED PERSONAL RISK FACTORS, NOT ELSEWHERE CLASSIFIED: ICD-10-CM

## 2025-03-11 DIAGNOSIS — I10 ESSENTIAL (PRIMARY) HYPERTENSION: ICD-10-CM

## 2025-03-11 DIAGNOSIS — C22.1 INTRAHEPATIC BILE DUCT CARCINOMA: ICD-10-CM

## 2025-03-11 LAB
A1C WITH ESTIMATED AVERAGE GLUCOSE RESULT: 5.5 % — SIGNIFICANT CHANGE UP (ref 4–5.6)
APTT BLD: 28.5 SEC — SIGNIFICANT CHANGE UP (ref 24.5–35.6)
BASOPHILS # BLD AUTO: 0.01 K/UL — SIGNIFICANT CHANGE UP (ref 0–0.2)
BASOPHILS NFR BLD AUTO: 0.3 % — SIGNIFICANT CHANGE UP (ref 0–2)
BLD GP AB SCN SERPL QL: NEGATIVE — SIGNIFICANT CHANGE UP
EOSINOPHIL # BLD AUTO: 0.03 K/UL — SIGNIFICANT CHANGE UP (ref 0–0.5)
EOSINOPHIL NFR BLD AUTO: 0.8 % — SIGNIFICANT CHANGE UP (ref 0–6)
ESTIMATED AVERAGE GLUCOSE: 111 — SIGNIFICANT CHANGE UP
HCT VFR BLD CALC: 28.8 % — LOW (ref 39–50)
HGB BLD-MCNC: 9.6 G/DL — LOW (ref 13–17)
IMM GRANULOCYTES NFR BLD AUTO: 0.8 % — SIGNIFICANT CHANGE UP (ref 0–0.9)
INR BLD: 0.9 RATIO — SIGNIFICANT CHANGE UP (ref 0.85–1.16)
LYMPHOCYTES # BLD AUTO: 0.86 K/UL — LOW (ref 1–3.3)
LYMPHOCYTES # BLD AUTO: 21.8 % — SIGNIFICANT CHANGE UP (ref 13–44)
MCHC RBC-ENTMCNC: 32.4 PG — SIGNIFICANT CHANGE UP (ref 27–34)
MCHC RBC-ENTMCNC: 33.3 G/DL — SIGNIFICANT CHANGE UP (ref 32–36)
MCV RBC AUTO: 97.3 FL — SIGNIFICANT CHANGE UP (ref 80–100)
MONOCYTES # BLD AUTO: 0.49 K/UL — SIGNIFICANT CHANGE UP (ref 0–0.9)
MONOCYTES NFR BLD AUTO: 12.4 % — SIGNIFICANT CHANGE UP (ref 2–14)
NEUTROPHILS # BLD AUTO: 2.52 K/UL — SIGNIFICANT CHANGE UP (ref 1.8–7.4)
NEUTROPHILS NFR BLD AUTO: 63.9 % — SIGNIFICANT CHANGE UP (ref 43–77)
PLATELET # BLD AUTO: 222 K/UL — SIGNIFICANT CHANGE UP (ref 150–400)
POTASSIUM SERPL-MCNC: 4.9 MMOL/L — SIGNIFICANT CHANGE UP (ref 3.5–5.3)
POTASSIUM SERPL-SCNC: 4.9 MMOL/L — SIGNIFICANT CHANGE UP (ref 3.5–5.3)
PROTHROM AB SERPL-ACNC: 10.7 SEC — SIGNIFICANT CHANGE UP (ref 9.9–13.4)
RBC # BLD: 2.96 M/UL — LOW (ref 4.2–5.8)
RBC # FLD: 14.9 % — HIGH (ref 10.3–14.5)
RH IG SCN BLD-IMP: NEGATIVE — SIGNIFICANT CHANGE UP
RH IG SCN BLD-IMP: NEGATIVE — SIGNIFICANT CHANGE UP
WBC # BLD: 3.94 K/UL — SIGNIFICANT CHANGE UP (ref 3.8–10.5)
WBC # FLD AUTO: 3.94 K/UL — SIGNIFICANT CHANGE UP (ref 3.8–10.5)

## 2025-03-11 NOTE — H&P PST ADULT - NSICDXPASTMEDICALHX_GEN_ALL_CORE_FT
PAST MEDICAL HISTORY:  Asthma     Colonic polyp     Dilated aortic root     History of aortic aneurysm     Intrahepatic bile duct carcinoma     Lung nodule     Malignant neoplasm of prostate     Migraine     Reflux esophagitis

## 2025-03-11 NOTE — H&P PST ADULT - LAST ECHOCARDIOGRAM
9/2024 (on patient's portal) echo yearly - aortic aneurysm - mildly dilated aortic root - aortic root diameter 4.2 cm, the ascending aorta is moderately dilated, 4.7cm, EF 55-60%

## 2025-03-11 NOTE — H&P PST ADULT - MUSCULOSKELETAL
normal/ROM intact/normal gait/strength 5/5 bilateral upper extremities/strength 5/5 bilateral lower extremities - - - details…

## 2025-03-11 NOTE — H&P PST ADULT - HISTORY OF PRESENT ILLNESS
70 y/o male PMHx of prostate cancer (2014) s/p radiation, HTN HLD aortic aneurysm, colonic polyps, asthma (mild intermittent), GERD with reflux esophagitis, lung nodule presents to presurgical testing with diagnosis of intrahepatic bile duct carcinoma. Pt completed chemo 2 weeks ago (has a right chest chemo port). Pt is scheduled for a celiac lymph node resection hepatic artery infusion pump placement

## 2025-03-11 NOTE — H&P PST ADULT - NSICDXPASTSURGICALHX_GEN_ALL_CORE_FT
PAST SURGICAL HISTORY:  H/O colonoscopy     H/O endoscopy     History of chemotherapy     History of liver biopsy     History of lung biopsy     History of radiation therapy     History of vascular access device

## 2025-03-11 NOTE — H&P PST ADULT - PRIMARY CARE PROVIDER
Dr Hancock oncologist [phone]                                                                                                                                          Haven Colon cardiologist [phone]

## 2025-03-11 NOTE — H&P PST ADULT - PROBLEM SELECTOR PLAN 1
Patient tentatively scheduled for celiac lymph node resection hepatic artery infusion pump placement for 3/26/25. Pre-op instructions provided - CLD the day before surgery and up to arrival time. Pt given verbal and written instructions with teach back on chlorhexidine shampoo. Pt will take own famotidine on the morning of procedure for GI prophylaxis. Pt verbalized understanding with return demonstration.    labs done by oncologist 3/10/25 (HIE) CBC CMP - hbg 9.7, neutropenia resolved WBC 4.7 (last chemo 2 weeks ago) Platelets improved 172K, potassium 5.4 not hemolyzed.     PST CBC T&S ABO potassium serum coags A1C done as per Dr Banerjee    Pending copy of echo due to h/o aortic aneurysm, dilated aorta (9/2024)

## 2025-03-11 NOTE — H&P PST ADULT - NEGATIVE HEMATOLOGY SYMPTOMS
h/o chemo - h/o anemia, neutropenia, and thrombocytopenia - improved after chemo - last session 2 weeks ago/no gum bleeding/no nose bleeding

## 2025-03-13 ENCOUNTER — NON-APPOINTMENT (OUTPATIENT)
Age: 70
End: 2025-03-13

## 2025-03-13 DIAGNOSIS — Z87.09 PERSONAL HISTORY OF OTHER DISEASES OF THE RESPIRATORY SYSTEM: ICD-10-CM

## 2025-03-13 DIAGNOSIS — G57.60 LESION OF PLANTAR NERVE, UNSPECIFIED LOWER LIMB: ICD-10-CM

## 2025-03-13 PROBLEM — J45.909 UNSPECIFIED ASTHMA, UNCOMPLICATED: Chronic | Status: ACTIVE | Noted: 2025-03-11

## 2025-03-13 PROBLEM — Z86.79 PERSONAL HISTORY OF OTHER DISEASES OF THE CIRCULATORY SYSTEM: Chronic | Status: ACTIVE | Noted: 2025-03-11

## 2025-03-13 PROBLEM — I77.810 THORACIC AORTIC ECTASIA: Chronic | Status: ACTIVE | Noted: 2025-03-11

## 2025-03-13 PROBLEM — G43.909 MIGRAINE, UNSPECIFIED, NOT INTRACTABLE, WITHOUT STATUS MIGRAINOSUS: Chronic | Status: ACTIVE | Noted: 2025-03-11

## 2025-03-13 PROBLEM — C22.1 INTRAHEPATIC BILE DUCT CARCINOMA: Chronic | Status: ACTIVE | Noted: 2025-03-11

## 2025-03-13 RX ORDER — PREDNISONE 20 MG/1
20 TABLET ORAL
Refills: 0 | Status: ACTIVE | COMMUNITY

## 2025-03-13 RX ORDER — TIOTROPIUM BROMIDE INHALATION SPRAY 1.56 UG/1
1.25 SPRAY, METERED RESPIRATORY (INHALATION)
Refills: 0 | Status: ACTIVE | COMMUNITY

## 2025-03-13 RX ORDER — PROMETHAZINE HYDROCHLORIDE AND DEXTROMETHORPHAN HYDROBROMIDE ORAL SOLUTION 15; 6.25 MG/5ML; MG/5ML
6.25-15 SOLUTION ORAL
Refills: 0 | Status: ACTIVE | COMMUNITY

## 2025-03-13 RX ORDER — TAMSULOSIN HYDROCHLORIDE 0.4 MG/1
0.4 CAPSULE ORAL
Refills: 0 | Status: ACTIVE | COMMUNITY

## 2025-03-14 ENCOUNTER — APPOINTMENT (OUTPATIENT)
Dept: SURGICAL ONCOLOGY | Facility: CLINIC | Age: 70
End: 2025-03-14

## 2025-03-14 DIAGNOSIS — C22.1 INTRAHEPATIC BILE DUCT CARCINOMA: ICD-10-CM

## 2025-03-14 PROCEDURE — 99213 OFFICE O/P EST LOW 20 MIN: CPT | Mod: 2W

## 2025-03-26 ENCOUNTER — APPOINTMENT (OUTPATIENT)
Dept: SURGICAL ONCOLOGY | Facility: HOSPITAL | Age: 70
End: 2025-03-26

## 2025-04-02 ENCOUNTER — APPOINTMENT (OUTPATIENT)
Dept: SURGICAL ONCOLOGY | Facility: HOSPITAL | Age: 70
End: 2025-04-02

## 2025-04-02 ENCOUNTER — TRANSCRIPTION ENCOUNTER (OUTPATIENT)
Age: 70
End: 2025-04-02

## 2025-04-02 ENCOUNTER — INPATIENT (INPATIENT)
Facility: HOSPITAL | Age: 70
LOS: 3 days | Discharge: HOME | End: 2025-04-06
Attending: SURGERY | Admitting: SURGERY
Payer: MEDICARE

## 2025-04-02 VITALS
DIASTOLIC BLOOD PRESSURE: 84 MMHG | RESPIRATION RATE: 18 BRPM | HEIGHT: 73 IN | OXYGEN SATURATION: 100 % | HEART RATE: 48 BPM | TEMPERATURE: 98 F | SYSTOLIC BLOOD PRESSURE: 127 MMHG | WEIGHT: 156.97 LBS

## 2025-04-02 DIAGNOSIS — Z98.890 OTHER SPECIFIED POSTPROCEDURAL STATES: Chronic | ICD-10-CM

## 2025-04-02 DIAGNOSIS — Z92.21 PERSONAL HISTORY OF ANTINEOPLASTIC CHEMOTHERAPY: Chronic | ICD-10-CM

## 2025-04-02 DIAGNOSIS — Z92.3 PERSONAL HISTORY OF IRRADIATION: Chronic | ICD-10-CM

## 2025-04-02 DIAGNOSIS — C22.1 INTRAHEPATIC BILE DUCT CARCINOMA: ICD-10-CM

## 2025-04-02 LAB
ALBUMIN SERPL ELPH-MCNC: 3.3 G/DL — SIGNIFICANT CHANGE UP (ref 3.3–5)
ALP SERPL-CCNC: 88 U/L — SIGNIFICANT CHANGE UP (ref 40–120)
ALT FLD-CCNC: 30 U/L — SIGNIFICANT CHANGE UP (ref 4–41)
ANION GAP SERPL CALC-SCNC: 12 MMOL/L — SIGNIFICANT CHANGE UP (ref 7–14)
APTT BLD: 26 SEC — SIGNIFICANT CHANGE UP (ref 24.5–35.6)
AST SERPL-CCNC: 31 U/L — SIGNIFICANT CHANGE UP (ref 4–40)
BILIRUB SERPL-MCNC: 0.6 MG/DL — SIGNIFICANT CHANGE UP (ref 0.2–1.2)
BUN SERPL-MCNC: 10 MG/DL — SIGNIFICANT CHANGE UP (ref 7–23)
CALCIUM SERPL-MCNC: 8.4 MG/DL — SIGNIFICANT CHANGE UP (ref 8.4–10.5)
CHLORIDE SERPL-SCNC: 103 MMOL/L — SIGNIFICANT CHANGE UP (ref 98–107)
CO2 SERPL-SCNC: 22 MMOL/L — SIGNIFICANT CHANGE UP (ref 22–31)
CREAT SERPL-MCNC: 1.01 MG/DL — SIGNIFICANT CHANGE UP (ref 0.5–1.3)
EGFR: 82 ML/MIN/1.73M2 — SIGNIFICANT CHANGE UP
EGFR: 82 ML/MIN/1.73M2 — SIGNIFICANT CHANGE UP
GLUCOSE BLDC GLUCOMTR-MCNC: 91 MG/DL — SIGNIFICANT CHANGE UP (ref 70–99)
GLUCOSE SERPL-MCNC: 136 MG/DL — HIGH (ref 70–99)
HCT VFR BLD CALC: 27.4 % — LOW (ref 39–50)
HGB BLD-MCNC: 9.4 G/DL — LOW (ref 13–17)
INR BLD: 1.02 RATIO — SIGNIFICANT CHANGE UP (ref 0.85–1.16)
MAGNESIUM SERPL-MCNC: 1.6 MG/DL — SIGNIFICANT CHANGE UP (ref 1.6–2.6)
MCHC RBC-ENTMCNC: 32.9 PG — SIGNIFICANT CHANGE UP (ref 27–34)
MCHC RBC-ENTMCNC: 34.3 G/DL — SIGNIFICANT CHANGE UP (ref 32–36)
MCV RBC AUTO: 95.8 FL — SIGNIFICANT CHANGE UP (ref 80–100)
NRBC # BLD AUTO: 0 K/UL — SIGNIFICANT CHANGE UP (ref 0–0)
NRBC # FLD: 0 K/UL — SIGNIFICANT CHANGE UP (ref 0–0)
NRBC BLD AUTO-RTO: 0 /100 WBCS — SIGNIFICANT CHANGE UP (ref 0–0)
PHOSPHATE SERPL-MCNC: 3.4 MG/DL — SIGNIFICANT CHANGE UP (ref 2.5–4.5)
PLATELET # BLD AUTO: 142 K/UL — LOW (ref 150–400)
POTASSIUM SERPL-MCNC: 4.1 MMOL/L — SIGNIFICANT CHANGE UP (ref 3.5–5.3)
POTASSIUM SERPL-SCNC: 4.1 MMOL/L — SIGNIFICANT CHANGE UP (ref 3.5–5.3)
PROT SERPL-MCNC: 5.6 G/DL — LOW (ref 6–8.3)
PROTHROM AB SERPL-ACNC: 12.1 SEC — SIGNIFICANT CHANGE UP (ref 9.9–13.4)
RBC # BLD: 2.86 M/UL — LOW (ref 4.2–5.8)
RBC # FLD: 13.9 % — SIGNIFICANT CHANGE UP (ref 10.3–14.5)
SODIUM SERPL-SCNC: 137 MMOL/L — SIGNIFICANT CHANGE UP (ref 135–145)
WBC # BLD: 8.51 K/UL — SIGNIFICANT CHANGE UP (ref 3.8–10.5)
WBC # FLD AUTO: 8.51 K/UL — SIGNIFICANT CHANGE UP (ref 3.8–10.5)

## 2025-04-02 PROCEDURE — 38747 REMOVE ABDOMINAL LYMPH NODES: CPT

## 2025-04-02 PROCEDURE — 47610 REMOVAL OF GALLBLADDER: CPT

## 2025-04-02 PROCEDURE — 36260 INSERTION OF INFUSION PUMP: CPT

## 2025-04-02 PROCEDURE — 88304 TISSUE EXAM BY PATHOLOGIST: CPT | Mod: 26

## 2025-04-02 PROCEDURE — 37617 LIGATION MAJOR ARTERY ABD: CPT

## 2025-04-02 PROCEDURE — 88305 TISSUE EXAM BY PATHOLOGIST: CPT | Mod: 26

## 2025-04-02 DEVICE — SURGICEL FIBRILLAR 2 X 4": Type: IMPLANTABLE DEVICE | Status: FUNCTIONAL

## 2025-04-02 DEVICE — LIGATING CLIPS WECK HORIZON MEDIUM (BLUE) 24: Type: IMPLANTABLE DEVICE | Status: FUNCTIONAL

## 2025-04-02 DEVICE — STAPLER ETHICON ECHELON ENDOPATH VASCULAR 35MM WHITE RELOAD: Type: IMPLANTABLE DEVICE | Status: FUNCTIONAL

## 2025-04-02 DEVICE — LIGATING CLIPS WECK HORIZON LARGE (ORANGE) 24: Type: IMPLANTABLE DEVICE | Status: FUNCTIONAL

## 2025-04-02 DEVICE — PUMP HEPATIC ARTERY INFUSION INTERA 3000: Type: IMPLANTABLE DEVICE | Status: FUNCTIONAL

## 2025-04-02 RX ORDER — ACETAMINOPHEN 500 MG/5ML
1000 LIQUID (ML) ORAL EVERY 6 HOURS
Refills: 0 | Status: COMPLETED | OUTPATIENT
Start: 2025-04-02 | End: 2025-04-03

## 2025-04-02 RX ORDER — SODIUM CHLORIDE 9 G/1000ML
1000 INJECTION, SOLUTION INTRAVENOUS
Refills: 0 | Status: DISCONTINUED | OUTPATIENT
Start: 2025-04-02 | End: 2025-04-02

## 2025-04-02 RX ORDER — ALBUTEROL SULFATE 2.5 MG/3ML
2 VIAL, NEBULIZER (ML) INHALATION EVERY 6 HOURS
Refills: 0 | Status: DISCONTINUED | OUTPATIENT
Start: 2025-04-02 | End: 2025-04-06

## 2025-04-02 RX ORDER — HEPARIN SODIUM 1000 [USP'U]/ML
5000 INJECTION INTRAVENOUS; SUBCUTANEOUS EVERY 8 HOURS
Refills: 0 | Status: COMPLETED | OUTPATIENT
Start: 2025-04-02 | End: 2025-04-04

## 2025-04-02 RX ORDER — HYDROMORPHONE/SOD CHLOR,ISO/PF 2 MG/10 ML
250 SYRINGE (ML) INJECTION
Refills: 0 | Status: DISCONTINUED | OUTPATIENT
Start: 2025-04-02 | End: 2025-04-04

## 2025-04-02 RX ORDER — UBIDECARENONE 100 MG
0 CAPSULE ORAL
Refills: 0 | DISCHARGE

## 2025-04-02 RX ORDER — FLUTICASONE PROPIONATE 220 UG/1
1 AEROSOL, METERED RESPIRATORY (INHALATION)
Refills: 0 | DISCHARGE

## 2025-04-02 RX ORDER — ONDANSETRON HCL/PF 4 MG/2 ML
4 VIAL (ML) INJECTION EVERY 6 HOURS
Refills: 0 | Status: DISCONTINUED | OUTPATIENT
Start: 2025-04-02 | End: 2025-04-06

## 2025-04-02 RX ORDER — AZELASTINE HYDROCHLORIDE AND FLUTICASONE PROPIONATE 137; 50 UG/1; UG/1
1 SPRAY, METERED NASAL
Refills: 0 | DISCHARGE

## 2025-04-02 RX ORDER — SODIUM CHLORIDE 9 G/1000ML
1000 INJECTION, SOLUTION INTRAVENOUS
Refills: 0 | Status: DISCONTINUED | OUTPATIENT
Start: 2025-04-02 | End: 2025-04-04

## 2025-04-02 RX ORDER — MONTELUKAST SODIUM 10 MG/1
10 TABLET ORAL DAILY
Refills: 0 | Status: DISCONTINUED | OUTPATIENT
Start: 2025-04-02 | End: 2025-04-06

## 2025-04-02 RX ORDER — CYANOCOBALAMIN 1000 UG/ML
0 INJECTION INTRAMUSCULAR; SUBCUTANEOUS
Refills: 0 | DISCHARGE

## 2025-04-02 RX ORDER — NALOXONE HYDROCHLORIDE 0.4 MG/ML
0.1 INJECTION, SOLUTION INTRAMUSCULAR; INTRAVENOUS; SUBCUTANEOUS
Refills: 0 | Status: DISCONTINUED | OUTPATIENT
Start: 2025-04-02 | End: 2025-04-06

## 2025-04-02 RX ORDER — DILTIAZEM HYDROCHLORIDE 240 MG/1
120 TABLET, EXTENDED RELEASE ORAL DAILY
Refills: 0 | Status: DISCONTINUED | OUTPATIENT
Start: 2025-04-03 | End: 2025-04-06

## 2025-04-02 RX ADMIN — Medication 250 MILLILITER(S): at 14:24

## 2025-04-02 RX ADMIN — SODIUM CHLORIDE 100 MILLILITER(S): 9 INJECTION, SOLUTION INTRAVENOUS at 14:25

## 2025-04-02 RX ADMIN — HEPARIN SODIUM 5000 UNIT(S): 1000 INJECTION INTRAVENOUS; SUBCUTANEOUS at 18:28

## 2025-04-02 RX ADMIN — Medication 1 DOSE(S): at 21:11

## 2025-04-02 RX ADMIN — Medication 250 MILLILITER(S): at 20:13

## 2025-04-02 RX ADMIN — Medication 400 MILLIGRAM(S): at 18:29

## 2025-04-02 RX ADMIN — Medication 400 MILLIGRAM(S): at 23:05

## 2025-04-02 NOTE — BRIEF OPERATIVE NOTE - NSICDXBRIEFPROCEDURE_GEN_ALL_CORE_FT
PROCEDURES:  Insertion, implantable infusion pump, hepatic artery 02-Apr-2025 12:56:19  Vilma Marr

## 2025-04-02 NOTE — PATIENT PROFILE ADULT - FALL HARM RISK - UNIVERSAL INTERVENTIONS
Bed in lowest position, wheels locked, appropriate side rails in place/Call bell, personal items and telephone in reach/Instruct patient to call for assistance before getting out of bed or chair/Non-slip footwear when patient is out of bed/Wever to call system/Physically safe environment - no spills, clutter or unnecessary equipment/Purposeful Proactive Rounding/Room/bathroom lighting operational, light cord in reach

## 2025-04-02 NOTE — BRIEF OPERATIVE NOTE - OPERATION/FINDINGS
Upper midline laparotomy, large firm celiac lymph node identified and resected. Celiac vasculature dissected free from common hepatic to GDA to proper hepatic. Replaced R hepatic artery noted. Cholecystectomy performed. 8x8cm prefascial pocket created in left hemiabdomen and catheter tunneled through fascia, pump secured to fascia. GDA ligated and catheter tunneled into GDA proximally and secured with 3 silk sutures. Methylene blue test showing only L liver color change, Replace R hepatic artery transected with vascular stapler and methylene blue repeated with entire liver color change, no color change noted extrahepatic.

## 2025-04-02 NOTE — CHART NOTE - NSCHARTNOTEFT_GEN_A_CORE
Patient seen and examined  Patient s/p ex lap for hepatic artery infusion pump placement  Patient overall reports feeling abdominal pain at the incision otherwise feeling well  Denies CP, SOB, N, V, LH, dizziness, fevers, chills  No flatus No BM  Voided already  Pain controlled on current regimen  Awake alert responsive  vitals stable  Abdomen soft, nondistended, appropriately tender, no rebound or guarding, no obvious masses or signs of hematoma  : Curtis in place with light yellow urine - green tinged 2/2 intraop ICG  No drains  midline and left sided incision c/d/i with surgical dermabond    PLAN  -Monitor abdominal exam and bowel function  -Pain control  -Strict I and Os  -Monitor incisions  - PACU labs reviewed h/h stable from preop testing    D Team Surgery  [id number]

## 2025-04-03 LAB
ALBUMIN SERPL ELPH-MCNC: 3.5 G/DL — SIGNIFICANT CHANGE UP (ref 3.3–5)
ALP SERPL-CCNC: 88 U/L — SIGNIFICANT CHANGE UP (ref 40–120)
ALT FLD-CCNC: 34 U/L — SIGNIFICANT CHANGE UP (ref 4–41)
ANION GAP SERPL CALC-SCNC: 11 MMOL/L — SIGNIFICANT CHANGE UP (ref 7–14)
APTT BLD: 25.5 SEC — SIGNIFICANT CHANGE UP (ref 24.5–35.6)
AST SERPL-CCNC: 35 U/L — SIGNIFICANT CHANGE UP (ref 4–40)
BILIRUB SERPL-MCNC: 0.9 MG/DL — SIGNIFICANT CHANGE UP (ref 0.2–1.2)
BUN SERPL-MCNC: 15 MG/DL — SIGNIFICANT CHANGE UP (ref 7–23)
CALCIUM SERPL-MCNC: 8.7 MG/DL — SIGNIFICANT CHANGE UP (ref 8.4–10.5)
CHLORIDE SERPL-SCNC: 102 MMOL/L — SIGNIFICANT CHANGE UP (ref 98–107)
CO2 SERPL-SCNC: 21 MMOL/L — LOW (ref 22–31)
CREAT SERPL-MCNC: 1.1 MG/DL — SIGNIFICANT CHANGE UP (ref 0.5–1.3)
EGFR: 74 ML/MIN/1.73M2 — SIGNIFICANT CHANGE UP
EGFR: 74 ML/MIN/1.73M2 — SIGNIFICANT CHANGE UP
GLUCOSE SERPL-MCNC: 107 MG/DL — HIGH (ref 70–99)
HCT VFR BLD CALC: 30.9 % — LOW (ref 39–50)
HGB BLD-MCNC: 10.5 G/DL — LOW (ref 13–17)
INR BLD: 1.04 RATIO — SIGNIFICANT CHANGE UP (ref 0.85–1.16)
MAGNESIUM SERPL-MCNC: 2 MG/DL — SIGNIFICANT CHANGE UP (ref 1.6–2.6)
MCHC RBC-ENTMCNC: 33 PG — SIGNIFICANT CHANGE UP (ref 27–34)
MCHC RBC-ENTMCNC: 34 G/DL — SIGNIFICANT CHANGE UP (ref 32–36)
MCV RBC AUTO: 97.2 FL — SIGNIFICANT CHANGE UP (ref 80–100)
NRBC # BLD AUTO: 0 K/UL — SIGNIFICANT CHANGE UP (ref 0–0)
NRBC # FLD: 0 K/UL — SIGNIFICANT CHANGE UP (ref 0–0)
NRBC BLD AUTO-RTO: 0 /100 WBCS — SIGNIFICANT CHANGE UP (ref 0–0)
PHOSPHATE SERPL-MCNC: 4.4 MG/DL — SIGNIFICANT CHANGE UP (ref 2.5–4.5)
PLATELET # BLD AUTO: 163 K/UL — SIGNIFICANT CHANGE UP (ref 150–400)
POTASSIUM SERPL-MCNC: 5.3 MMOL/L — SIGNIFICANT CHANGE UP (ref 3.5–5.3)
POTASSIUM SERPL-SCNC: 5.3 MMOL/L — SIGNIFICANT CHANGE UP (ref 3.5–5.3)
PROT SERPL-MCNC: 5.9 G/DL — LOW (ref 6–8.3)
PROTHROM AB SERPL-ACNC: 12.1 SEC — SIGNIFICANT CHANGE UP (ref 9.9–13.4)
RBC # BLD: 3.18 M/UL — LOW (ref 4.2–5.8)
RBC # FLD: 14 % — SIGNIFICANT CHANGE UP (ref 10.3–14.5)
SODIUM SERPL-SCNC: 134 MMOL/L — LOW (ref 135–145)
WBC # BLD: 7.96 K/UL — SIGNIFICANT CHANGE UP (ref 3.8–10.5)
WBC # FLD AUTO: 7.96 K/UL — SIGNIFICANT CHANGE UP (ref 3.8–10.5)

## 2025-04-03 RX ORDER — ACETAMINOPHEN 500 MG/5ML
1000 LIQUID (ML) ORAL EVERY 6 HOURS
Refills: 0 | Status: COMPLETED | OUTPATIENT
Start: 2025-04-03 | End: 2025-04-04

## 2025-04-03 RX ADMIN — HEPARIN SODIUM 5000 UNIT(S): 1000 INJECTION INTRAVENOUS; SUBCUTANEOUS at 17:51

## 2025-04-03 RX ADMIN — SODIUM CHLORIDE 42 MILLILITER(S): 9 INJECTION, SOLUTION INTRAVENOUS at 09:17

## 2025-04-03 RX ADMIN — Medication 250 MILLILITER(S): at 07:27

## 2025-04-03 RX ADMIN — DILTIAZEM HYDROCHLORIDE 120 MILLIGRAM(S): 240 TABLET, EXTENDED RELEASE ORAL at 05:16

## 2025-04-03 RX ADMIN — Medication 400 MILLIGRAM(S): at 23:08

## 2025-04-03 RX ADMIN — Medication 1000 MILLIGRAM(S): at 18:25

## 2025-04-03 RX ADMIN — SODIUM CHLORIDE 100 MILLILITER(S): 9 INJECTION, SOLUTION INTRAVENOUS at 07:26

## 2025-04-03 RX ADMIN — MONTELUKAST SODIUM 10 MILLIGRAM(S): 10 TABLET ORAL at 11:25

## 2025-04-03 RX ADMIN — Medication 1000 MILLIGRAM(S): at 12:00

## 2025-04-03 RX ADMIN — Medication 1 DOSE(S): at 21:29

## 2025-04-03 RX ADMIN — HEPARIN SODIUM 5000 UNIT(S): 1000 INJECTION INTRAVENOUS; SUBCUTANEOUS at 02:44

## 2025-04-03 RX ADMIN — Medication 400 MILLIGRAM(S): at 17:51

## 2025-04-03 RX ADMIN — Medication 40 MILLIGRAM(S): at 11:25

## 2025-04-03 RX ADMIN — HEPARIN SODIUM 5000 UNIT(S): 1000 INJECTION INTRAVENOUS; SUBCUTANEOUS at 09:18

## 2025-04-03 RX ADMIN — Medication 1 DOSE(S): at 09:17

## 2025-04-03 RX ADMIN — Medication 400 MILLIGRAM(S): at 05:17

## 2025-04-03 RX ADMIN — Medication 40 MILLIGRAM(S): at 05:16

## 2025-04-03 RX ADMIN — Medication 1000 MILLIGRAM(S): at 06:00

## 2025-04-03 RX ADMIN — Medication 250 MILLILITER(S): at 18:46

## 2025-04-03 RX ADMIN — Medication 250 MILLILITER(S): at 19:01

## 2025-04-03 RX ADMIN — Medication 400 MILLIGRAM(S): at 11:25

## 2025-04-03 NOTE — PHYSICAL THERAPY INITIAL EVALUATION ADULT - GENERAL OBSERVATIONS, REHAB EVAL
Pt encountered seated in chair, no distress, AxOx4, with +IV, right chest wall port,  +spinal epidural, +pulse oximeter, and wife at bedside. Pt agreeable to participate in PT evaluation. Vitals taken; BP 95/72mmHg, heart rate 63bpm.

## 2025-04-03 NOTE — PHYSICAL THERAPY INITIAL EVALUATION ADULT - ACTIVE RANGE OF MOTION EXAMINATION, REHAB EVAL
bilateral shoulder flexion to 90 degrees, bilateral elbows/hand/wrist WFL/bilateral  lower extremity Active ROM was WFL (within functional limits)

## 2025-04-03 NOTE — PHYSICAL THERAPY INITIAL EVALUATION ADULT - ADDITIONAL COMMENTS
Pt lives in an apartment with his wife with ~6 steps to negotiate; (+)bilateral handrails far apart. Prior to hospital admission, pt was completely independent and used no assistive device with ambulation. Pt denies any recent falls.    Pt left comfortable seated in chair, NAD, all lines intact, all precautions maintained, with call bell in reach, wife at bedside, and RN Haile aware.

## 2025-04-03 NOTE — PHYSICAL THERAPY INITIAL EVALUATION ADULT - PASSIVE RANGE OF MOTION EXAMINATION, REHAB EVAL
bilateral shoulder flexion to 90 degrees, bilateral elbows/hand/wrist WFL/bilateral lower extremity Passive ROM was WFL (within functional limits)

## 2025-04-03 NOTE — PHYSICAL THERAPY INITIAL EVALUATION ADULT - RANGE OF MOTION EXAMINATION, REHAB EVAL
bilateral shoulder flexion to 90 degrees, bilateral elbows/hand/wrist WFL/bilateral lower extremity ROM was WFL (within functional limits)

## 2025-04-03 NOTE — PHYSICAL THERAPY INITIAL EVALUATION ADULT - PERTINENT HX OF CURRENT PROBLEM, REHAB EVAL
68 y/o male PMHx of prostate cancer (2014) s/p radiation, HTN HLD aortic aneurysm, colonic polyps, asthma (mild intermittent), GERD with reflux esophagitis, lung nodule presents to presurgical testing with diagnosis of intrahepatic bile duct carcinoma. Pt completed chemo 2 weeks ago (has a right chest chemo port). Pt is scheduled for a celiac lymph node resection hepatic artery infusion pump placement

## 2025-04-04 ENCOUNTER — NON-APPOINTMENT (OUTPATIENT)
Age: 70
End: 2025-04-04

## 2025-04-04 LAB
ALBUMIN SERPL ELPH-MCNC: 3.4 G/DL — SIGNIFICANT CHANGE UP (ref 3.3–5)
ALP SERPL-CCNC: 83 U/L — SIGNIFICANT CHANGE UP (ref 40–120)
ALT FLD-CCNC: 30 U/L — SIGNIFICANT CHANGE UP (ref 4–41)
ANION GAP SERPL CALC-SCNC: 17 MMOL/L — HIGH (ref 7–14)
APTT BLD: 24.1 SEC — LOW (ref 24.5–35.6)
AST SERPL-CCNC: 30 U/L — SIGNIFICANT CHANGE UP (ref 4–40)
BILIRUB SERPL-MCNC: 0.9 MG/DL — SIGNIFICANT CHANGE UP (ref 0.2–1.2)
BUN SERPL-MCNC: 14 MG/DL — SIGNIFICANT CHANGE UP (ref 7–23)
CALCIUM SERPL-MCNC: 8.1 MG/DL — LOW (ref 8.4–10.5)
CHLORIDE SERPL-SCNC: 99 MMOL/L — SIGNIFICANT CHANGE UP (ref 98–107)
CO2 SERPL-SCNC: 19 MMOL/L — LOW (ref 22–31)
CREAT SERPL-MCNC: 1 MG/DL — SIGNIFICANT CHANGE UP (ref 0.5–1.3)
EGFR: 82 ML/MIN/1.73M2 — SIGNIFICANT CHANGE UP
EGFR: 82 ML/MIN/1.73M2 — SIGNIFICANT CHANGE UP
GLUCOSE SERPL-MCNC: 109 MG/DL — HIGH (ref 70–99)
HCT VFR BLD CALC: 28.3 % — LOW (ref 39–50)
HGB BLD-MCNC: 9.6 G/DL — LOW (ref 13–17)
INR BLD: 1.08 RATIO — SIGNIFICANT CHANGE UP (ref 0.85–1.16)
MAGNESIUM SERPL-MCNC: 1.8 MG/DL — SIGNIFICANT CHANGE UP (ref 1.6–2.6)
MCHC RBC-ENTMCNC: 33.4 PG — SIGNIFICANT CHANGE UP (ref 27–34)
MCHC RBC-ENTMCNC: 33.9 G/DL — SIGNIFICANT CHANGE UP (ref 32–36)
MCV RBC AUTO: 98.6 FL — SIGNIFICANT CHANGE UP (ref 80–100)
NRBC # BLD AUTO: 0 K/UL — SIGNIFICANT CHANGE UP (ref 0–0)
NRBC # FLD: 0 K/UL — SIGNIFICANT CHANGE UP (ref 0–0)
NRBC BLD AUTO-RTO: 0 /100 WBCS — SIGNIFICANT CHANGE UP (ref 0–0)
PHOSPHATE SERPL-MCNC: 3.3 MG/DL — SIGNIFICANT CHANGE UP (ref 2.5–4.5)
PLATELET # BLD AUTO: 148 K/UL — LOW (ref 150–400)
POTASSIUM SERPL-MCNC: 4.2 MMOL/L — SIGNIFICANT CHANGE UP (ref 3.5–5.3)
POTASSIUM SERPL-SCNC: 4.2 MMOL/L — SIGNIFICANT CHANGE UP (ref 3.5–5.3)
PROT SERPL-MCNC: 5.7 G/DL — LOW (ref 6–8.3)
PROTHROM AB SERPL-ACNC: 12.9 SEC — SIGNIFICANT CHANGE UP (ref 9.9–13.4)
RBC # BLD: 2.87 M/UL — LOW (ref 4.2–5.8)
RBC # FLD: 14.4 % — SIGNIFICANT CHANGE UP (ref 10.3–14.5)
SODIUM SERPL-SCNC: 135 MMOL/L — SIGNIFICANT CHANGE UP (ref 135–145)
WBC # BLD: 6.48 K/UL — SIGNIFICANT CHANGE UP (ref 3.8–10.5)
WBC # FLD AUTO: 6.48 K/UL — SIGNIFICANT CHANGE UP (ref 3.8–10.5)

## 2025-04-04 RX ORDER — OXYCODONE HYDROCHLORIDE 30 MG/1
2.5 TABLET ORAL EVERY 6 HOURS
Refills: 0 | Status: DISCONTINUED | OUTPATIENT
Start: 2025-04-04 | End: 2025-04-06

## 2025-04-04 RX ORDER — HYDROMORPHONE/SOD CHLOR,ISO/PF 2 MG/10 ML
0.25 SYRINGE (ML) INJECTION EVERY 6 HOURS
Refills: 0 | Status: DISCONTINUED | OUTPATIENT
Start: 2025-04-04 | End: 2025-04-06

## 2025-04-04 RX ORDER — OXYCODONE HYDROCHLORIDE 30 MG/1
5 TABLET ORAL EVERY 6 HOURS
Refills: 0 | Status: DISCONTINUED | OUTPATIENT
Start: 2025-04-04 | End: 2025-04-06

## 2025-04-04 RX ORDER — BISACODYL 5 MG
10 TABLET, DELAYED RELEASE (ENTERIC COATED) ORAL ONCE
Refills: 0 | Status: COMPLETED | OUTPATIENT
Start: 2025-04-04 | End: 2025-04-04

## 2025-04-04 RX ORDER — LIDOCAINE HYDROCHLORIDE 20 MG/ML
1 JELLY TOPICAL EVERY 24 HOURS
Refills: 0 | Status: DISCONTINUED | OUTPATIENT
Start: 2025-04-04 | End: 2025-04-06

## 2025-04-04 RX ORDER — HYDROCORTISONE 10 MG/G
1 CREAM TOPICAL
Refills: 0 | Status: DISCONTINUED | OUTPATIENT
Start: 2025-04-04 | End: 2025-04-06

## 2025-04-04 RX ORDER — MAGNESIUM SULFATE 500 MG/ML
2 SYRINGE (ML) INJECTION ONCE
Refills: 0 | Status: COMPLETED | OUTPATIENT
Start: 2025-04-04 | End: 2025-04-04

## 2025-04-04 RX ORDER — ACETAMINOPHEN 500 MG/5ML
975 LIQUID (ML) ORAL EVERY 6 HOURS
Refills: 0 | Status: DISCONTINUED | OUTPATIENT
Start: 2025-04-04 | End: 2025-04-06

## 2025-04-04 RX ORDER — HYDROCORTISONE 10 MG/G
1 CREAM TOPICAL
Refills: 0 | Status: DISCONTINUED | OUTPATIENT
Start: 2025-04-04 | End: 2025-04-04

## 2025-04-04 RX ORDER — SENNA 187 MG
2 TABLET ORAL AT BEDTIME
Refills: 0 | Status: DISCONTINUED | OUTPATIENT
Start: 2025-04-04 | End: 2025-04-06

## 2025-04-04 RX ADMIN — LIDOCAINE HYDROCHLORIDE 1 PATCH: 20 JELLY TOPICAL at 17:34

## 2025-04-04 RX ADMIN — Medication 1000 MILLIGRAM(S): at 11:43

## 2025-04-04 RX ADMIN — Medication 1 DOSE(S): at 09:21

## 2025-04-04 RX ADMIN — LIDOCAINE HYDROCHLORIDE 1 PATCH: 20 JELLY TOPICAL at 19:07

## 2025-04-04 RX ADMIN — HEPARIN SODIUM 5000 UNIT(S): 1000 INJECTION INTRAVENOUS; SUBCUTANEOUS at 02:53

## 2025-04-04 RX ADMIN — MONTELUKAST SODIUM 10 MILLIGRAM(S): 10 TABLET ORAL at 11:13

## 2025-04-04 RX ADMIN — Medication 400 MILLIGRAM(S): at 05:33

## 2025-04-04 RX ADMIN — Medication 250 MILLILITER(S): at 07:45

## 2025-04-04 RX ADMIN — Medication 975 MILLIGRAM(S): at 23:30

## 2025-04-04 RX ADMIN — HEPARIN SODIUM 5000 UNIT(S): 1000 INJECTION INTRAVENOUS; SUBCUTANEOUS at 19:19

## 2025-04-04 RX ADMIN — Medication 400 MILLIGRAM(S): at 11:13

## 2025-04-04 RX ADMIN — Medication 2 TABLET(S): at 21:38

## 2025-04-04 RX ADMIN — Medication 250 MILLILITER(S): at 19:19

## 2025-04-04 RX ADMIN — HYDROCORTISONE 1 APPLICATION(S): 10 CREAM TOPICAL at 17:34

## 2025-04-04 RX ADMIN — Medication 250 MILLILITER(S): at 08:32

## 2025-04-04 RX ADMIN — Medication 40 MILLIGRAM(S): at 11:14

## 2025-04-04 RX ADMIN — HEPARIN SODIUM 5000 UNIT(S): 1000 INJECTION INTRAVENOUS; SUBCUTANEOUS at 09:21

## 2025-04-04 RX ADMIN — Medication 25 GRAM(S): at 09:21

## 2025-04-04 RX ADMIN — Medication 40 MILLIGRAM(S): at 05:34

## 2025-04-04 RX ADMIN — Medication 10 MILLIGRAM(S): at 17:34

## 2025-04-04 RX ADMIN — Medication 4 MILLIGRAM(S): at 23:33

## 2025-04-04 NOTE — DISCHARGE NOTE PROVIDER - CARE PROVIDER_API CALL
Marco A Banerjee  Surgery  [address] [place] [id number]  Phone: [phone]  Fax: [phone]  Follow Up Time: 2 weeks

## 2025-04-04 NOTE — DISCHARGE NOTE PROVIDER - NSDCHOSPICE_GEN_A_CORE
How Severe Is It?: moderate Is This A New Presentation, Or A Follow-Up?: Follow Up Grayce Oppenheim What Dose Of Taltz Are You Taking?: 80mg SC every 4 weeks No

## 2025-04-04 NOTE — DISCHARGE NOTE PROVIDER - HOSPITAL COURSE
This patient is a 69y male with PMHx of prostate cancer (2014) s/p radiation, HTN HLD aortic aneurysm, colonic polyps, asthma (mild intermittent), GERD with reflux esophagitis, lung nodule, diagnosis of intrahepatic bile duct carcinoma (cholangiocarcinoma) s/p chemotherapy. Patient presented to [facility] on 4/2/25 for scheduled surgery. Taken to the OR by Dr. Banerjee and underwent exploratory laparotomy, celiac lymph node biopsy, cholecystectomy and placement of hepatic artery infusion pump. Patient tolerated operation well and there were no post-operative complications identified. Patient remained hemodynamically stable in the PACU and transferred to the surgical floor. Diet was restarted and advanced as tolerated. Pain control was transitioned from IV to PO pain meds.   Patient seen by physical therapy throughout hospital stay who recommended patient be discharged home without skilled PT needs.  At this time, patient is currently ambulating, voiding, tolerating a regular diet. Pain well controlled on PO pain meds. Patient has been deemed stable for discharge home with follow up as an outpatient.

## 2025-04-04 NOTE — DISCHARGE NOTE PROVIDER - CARE PROVIDERS DIRECT ADDRESSES
[email or web address]

## 2025-04-04 NOTE — DISCHARGE NOTE PROVIDER - NSDCCPTREATMENT_GEN_ALL_CORE_FT
PRINCIPAL PROCEDURE  Procedure: Insertion, implantable infusion pump, hepatic artery  Findings and Treatment:

## 2025-04-04 NOTE — DISCHARGE NOTE PROVIDER - NSDCMRMEDTOKEN_GEN_ALL_CORE_FT
Albuterol (Eqv-Ventolin HFA) 90 mcg/inh inhalation aerosol: 2 inhaled  Arnuity Ellipta 100 mcg/inh inhalation powder: 1 inhaled once a day  ascorbic acid: once a day  atorvastatin 10 mg oral tablet: 1 tab(s) orally  azelastine-fluticasone 137 mcg-50 mcg/inh nasal spray: 1 spray(s) intranasally once a day  CoQ10: 1 cap orally once a day  dilTIAZem 120 mg/24 hours oral capsule, extended release: 1 cap(s) orally  famotidine 20 mg oral tablet: 2 tab(s) orally once a day  magnesium: 1 cap orally once a day  montelukast 10 mg oral tablet: 1 tab(s) orally  pantoprazole 40 mg oral delayed release tablet: 1 tab(s) orally once a day (at bedtime)  riboflavin 400 mg oral tablet: 1 tab(s) orally  vitamin B12: 1 cap orally once a day   acetaminophen 325 mg oral tablet: 3 tab(s) orally every 6 hours  albuterol 90 mcg/inh inhalation aerosol: 2 puff(s) inhaled every 6 hours As needed Shortness of Breath and/or Wheezing  Arnuity Ellipta 100 mcg/inh inhalation powder: 1 inhaled once a day  ascorbic acid: once a day  atorvastatin 10 mg oral tablet: 1 tab(s) orally once a day  azelastine-fluticasone 137 mcg-50 mcg/inh nasal spray: 1 spray(s) intranasally once a day  CoQ10: 1 cap orally once a day  dilTIAZem 120 mg/24 hours oral capsule, extended release: 1 cap(s) orally once a day  famotidine 20 mg oral tablet: 2 tab(s) orally once a day  hydrocortisone 2.5% topical cream: 1 Apply topically to affected area 2 times a day  ibuprofen 200 mg oral tablet: 1 tab(s) orally every 6 hours Alternate with tylenol for pain  magnesium: 1 cap orally once a day  montelukast 10 mg oral tablet: 1 tab(s) orally once a day  oxyCODONE 5 mg oral tablet: 1 tab(s) orally every 6 hours as needed for  severe pain for severe pain not controlled by tylenol and ibuprofen MDD: 20mg (4 tabs)  pantoprazole 40 mg oral delayed release tablet: 1 tab(s) orally once a day (at bedtime)  polyethylene glycol 3350 oral powder for reconstitution: 17 gram(s) orally once a day as needed for  constipation  riboflavin 400 mg oral tablet: 1 tab(s) orally  vitamin B12: 1 cap orally once a day

## 2025-04-04 NOTE — DISCHARGE NOTE PROVIDER - NSDCFUSCHEDAPPT_GEN_ALL_CORE_FT
[facility]  NUCMED  Community D  Scheduled Appointment: 04/09/2025    Nick Hancock  [facility] PreAdmits  Scheduled Appointment: 04/14/2025    Nick Hancock  [facility]  HEMONC [address]  Scheduled Appointment: 04/14/2025    [facility]  AMBCHEMO  [address]  Scheduled Appointment: 04/14/2025    [facility]  AMBCHEMO  [address]  Scheduled Appointment: 04/21/2025    [facility]  AMBCHEMO  [address]  Scheduled Appointment: 05/05/2025    [facility]  AMBCHEMO  [address]  Scheduled Appointment: 05/12/2025    [facility]  AMBCHEMO  [address]  Scheduled Appointment: 05/27/2025    [facility]  AMBCHEMO  [address]  Scheduled Appointment: 06/02/2025

## 2025-04-04 NOTE — DISCHARGE NOTE PROVIDER - NSDCCPCAREPLAN_GEN_ALL_CORE_FT
PRINCIPAL DISCHARGE DIAGNOSIS  Diagnosis: Intrahepatic bile duct carcinoma  Assessment and Plan of Treatment: WOUND CARE:  Please keep incisions clean and dry. Please do not Scrub or rub incisions. Do not use lotion or powder on incisions.   BATHING: You may shower and/or sponge bathe. You may use warm soapy water in the shower and rinse, pat dry.  ACTIVITY: No heavy lifting (>20lbs) or straining for 8 weeks. Otherwise, you may return to your usual level of physical activity. If you are taking narcotic pain medication DO NOT drive a car, operate machinery or make important decisions.  PAIN: You may take over-the-counter medications for pain. You make take Tylenol orally every 6 hours as needed. Do not exceed 4,000mg a day. You may take ibuprofen every 6 hours. You may alternate between the two for better pain control (every 3 hours). You have been prescribed narcotics for pain management. Please take as prescribed on pill bottle, AS NEEDED, for BREAKTHROUGH pain ONLY. If you are taking narcotic pain medication DO NOT drive a car, operate machinery or make important decisions.  DIET: Return to your usual diet.  NOTIFY YOUR SURGEON IF YOU HAVE: any bleeding that does not stop, any pus draining from your wound(s), any fever (over 100.4 F) persistent nausea/vomiting, or if your pain is not controlled on your discharge pain medications, unable to urinate.  FOLLOW UP:  1. Please follow up with your primary care physician in one week regarding your hospitalization, bring copies of your discharge paperwork.  2. Please follow up with your surgeon, Dr. Banerjee. During business hours (M-F 8:30-4:30) call [phone] with questions or to make an appointment.  After hours, please call [phone] to reach the on-call provider.

## 2025-04-05 LAB
ALBUMIN SERPL ELPH-MCNC: 3.4 G/DL — SIGNIFICANT CHANGE UP (ref 3.3–5)
ALP SERPL-CCNC: 89 U/L — SIGNIFICANT CHANGE UP (ref 40–120)
ALT FLD-CCNC: 27 U/L — SIGNIFICANT CHANGE UP (ref 4–41)
ANION GAP SERPL CALC-SCNC: 12 MMOL/L — SIGNIFICANT CHANGE UP (ref 7–14)
APTT BLD: 25.7 SEC — SIGNIFICANT CHANGE UP (ref 24.5–35.6)
AST SERPL-CCNC: 23 U/L — SIGNIFICANT CHANGE UP (ref 4–40)
BILIRUB SERPL-MCNC: 1 MG/DL — SIGNIFICANT CHANGE UP (ref 0.2–1.2)
BUN SERPL-MCNC: 11 MG/DL — SIGNIFICANT CHANGE UP (ref 7–23)
CALCIUM SERPL-MCNC: 8.7 MG/DL — SIGNIFICANT CHANGE UP (ref 8.4–10.5)
CHLORIDE SERPL-SCNC: 100 MMOL/L — SIGNIFICANT CHANGE UP (ref 98–107)
CO2 SERPL-SCNC: 23 MMOL/L — SIGNIFICANT CHANGE UP (ref 22–31)
CREAT SERPL-MCNC: 0.93 MG/DL — SIGNIFICANT CHANGE UP (ref 0.5–1.3)
EGFR: 90 ML/MIN/1.73M2 — SIGNIFICANT CHANGE UP
EGFR: 90 ML/MIN/1.73M2 — SIGNIFICANT CHANGE UP
GLUCOSE SERPL-MCNC: 116 MG/DL — HIGH (ref 70–99)
HCT VFR BLD CALC: 27.9 % — LOW (ref 39–50)
HGB BLD-MCNC: 9.8 G/DL — LOW (ref 13–17)
INR BLD: 1.11 RATIO — SIGNIFICANT CHANGE UP (ref 0.85–1.16)
MAGNESIUM SERPL-MCNC: 1.9 MG/DL — SIGNIFICANT CHANGE UP (ref 1.6–2.6)
MCHC RBC-ENTMCNC: 33.7 PG — SIGNIFICANT CHANGE UP (ref 27–34)
MCHC RBC-ENTMCNC: 35.1 G/DL — SIGNIFICANT CHANGE UP (ref 32–36)
MCV RBC AUTO: 95.9 FL — SIGNIFICANT CHANGE UP (ref 80–100)
NRBC # BLD AUTO: 0 K/UL — SIGNIFICANT CHANGE UP (ref 0–0)
NRBC # FLD: 0 K/UL — SIGNIFICANT CHANGE UP (ref 0–0)
NRBC BLD AUTO-RTO: 0 /100 WBCS — SIGNIFICANT CHANGE UP (ref 0–0)
PHOSPHATE SERPL-MCNC: 2.7 MG/DL — SIGNIFICANT CHANGE UP (ref 2.5–4.5)
PLATELET # BLD AUTO: 147 K/UL — LOW (ref 150–400)
POTASSIUM SERPL-MCNC: 4 MMOL/L — SIGNIFICANT CHANGE UP (ref 3.5–5.3)
POTASSIUM SERPL-SCNC: 4 MMOL/L — SIGNIFICANT CHANGE UP (ref 3.5–5.3)
PROT SERPL-MCNC: 5.9 G/DL — LOW (ref 6–8.3)
PROTHROM AB SERPL-ACNC: 12.9 SEC — SIGNIFICANT CHANGE UP (ref 9.9–13.4)
RBC # BLD: 2.91 M/UL — LOW (ref 4.2–5.8)
RBC # FLD: 14 % — SIGNIFICANT CHANGE UP (ref 10.3–14.5)
SODIUM SERPL-SCNC: 135 MMOL/L — SIGNIFICANT CHANGE UP (ref 135–145)
WBC # BLD: 7.51 K/UL — SIGNIFICANT CHANGE UP (ref 3.8–10.5)
WBC # FLD AUTO: 7.51 K/UL — SIGNIFICANT CHANGE UP (ref 3.8–10.5)

## 2025-04-05 RX ORDER — BISACODYL 5 MG
10 TABLET, DELAYED RELEASE (ENTERIC COATED) ORAL ONCE
Refills: 0 | Status: COMPLETED | OUTPATIENT
Start: 2025-04-05 | End: 2025-04-05

## 2025-04-05 RX ORDER — POLYETHYLENE GLYCOL 3350 17 G/17G
17 POWDER, FOR SOLUTION ORAL
Refills: 0 | Status: DISCONTINUED | OUTPATIENT
Start: 2025-04-05 | End: 2025-04-06

## 2025-04-05 RX ORDER — POLYETHYLENE GLYCOL 3350 17 G/17G
17 POWDER, FOR SOLUTION ORAL ONCE
Refills: 0 | Status: COMPLETED | OUTPATIENT
Start: 2025-04-05 | End: 2025-04-05

## 2025-04-05 RX ORDER — HEPARIN SODIUM 1000 [USP'U]/ML
5000 INJECTION INTRAVENOUS; SUBCUTANEOUS EVERY 8 HOURS
Refills: 0 | Status: DISCONTINUED | OUTPATIENT
Start: 2025-04-05 | End: 2025-04-06

## 2025-04-05 RX ADMIN — LIDOCAINE HYDROCHLORIDE 1 PATCH: 20 JELLY TOPICAL at 21:06

## 2025-04-05 RX ADMIN — MONTELUKAST SODIUM 10 MILLIGRAM(S): 10 TABLET ORAL at 12:13

## 2025-04-05 RX ADMIN — Medication 975 MILLIGRAM(S): at 05:24

## 2025-04-05 RX ADMIN — Medication 975 MILLIGRAM(S): at 13:10

## 2025-04-05 RX ADMIN — Medication 975 MILLIGRAM(S): at 12:12

## 2025-04-05 RX ADMIN — Medication 975 MILLIGRAM(S): at 23:26

## 2025-04-05 RX ADMIN — HEPARIN SODIUM 5000 UNIT(S): 1000 INJECTION INTRAVENOUS; SUBCUTANEOUS at 12:12

## 2025-04-05 RX ADMIN — Medication 10 MILLIGRAM(S): at 12:11

## 2025-04-05 RX ADMIN — Medication 975 MILLIGRAM(S): at 00:41

## 2025-04-05 RX ADMIN — Medication 975 MILLIGRAM(S): at 06:21

## 2025-04-05 RX ADMIN — Medication 4 MILLIGRAM(S): at 05:33

## 2025-04-05 RX ADMIN — Medication 975 MILLIGRAM(S): at 17:22

## 2025-04-05 RX ADMIN — Medication 40 MILLIGRAM(S): at 12:13

## 2025-04-05 RX ADMIN — Medication 40 MILLIGRAM(S): at 05:21

## 2025-04-05 RX ADMIN — Medication 2 TABLET(S): at 21:16

## 2025-04-05 RX ADMIN — Medication 1 DOSE(S): at 12:13

## 2025-04-05 RX ADMIN — POLYETHYLENE GLYCOL 3350 17 GRAM(S): 17 POWDER, FOR SOLUTION ORAL at 12:11

## 2025-04-05 RX ADMIN — Medication 1 DOSE(S): at 21:17

## 2025-04-05 RX ADMIN — POLYETHYLENE GLYCOL 3350 17 GRAM(S): 17 POWDER, FOR SOLUTION ORAL at 21:16

## 2025-04-05 RX ADMIN — HYDROCORTISONE 1 APPLICATION(S): 10 CREAM TOPICAL at 05:21

## 2025-04-05 RX ADMIN — HEPARIN SODIUM 5000 UNIT(S): 1000 INJECTION INTRAVENOUS; SUBCUTANEOUS at 21:16

## 2025-04-05 RX ADMIN — LIDOCAINE HYDROCHLORIDE 1 PATCH: 20 JELLY TOPICAL at 17:23

## 2025-04-05 RX ADMIN — DILTIAZEM HYDROCHLORIDE 120 MILLIGRAM(S): 240 TABLET, EXTENDED RELEASE ORAL at 05:21

## 2025-04-05 RX ADMIN — Medication 1 DOSE(S): at 05:21

## 2025-04-05 NOTE — PROVIDER CONTACT NOTE (OTHER) - ASSESSMENT
NAD noted, Pt. denies chest pain or SOB
VSS - /70, HR 72, sp02 97%, RR 18  Pt denies SOB, chest pain, palpitations

## 2025-04-05 NOTE — PROVIDER CONTACT NOTE (OTHER) - ACTION/TREATMENT ORDERED:
Provider at bedside, told to call pain management, anesthesiology removed epidural catheter, PCEA discontinued and wasted with TAMIKA HUNT, pt. on PRN pain medications.
Provider coming to bedside with team for morning rounds

## 2025-04-05 NOTE — PROVIDER CONTACT NOTE (OTHER) - BACKGROUND
Celiac lymph node resection, cholecystectomy, hepatic pump infusion insertion
s/p R VATS, RUL lobectomy and MLND

## 2025-04-06 VITALS
DIASTOLIC BLOOD PRESSURE: 76 MMHG | HEART RATE: 73 BPM | TEMPERATURE: 98 F | OXYGEN SATURATION: 100 % | SYSTOLIC BLOOD PRESSURE: 117 MMHG | RESPIRATION RATE: 18 BRPM

## 2025-04-06 LAB
ALBUMIN SERPL ELPH-MCNC: 3.5 G/DL — SIGNIFICANT CHANGE UP (ref 3.3–5)
ALP SERPL-CCNC: 102 U/L — SIGNIFICANT CHANGE UP (ref 40–120)
ALT FLD-CCNC: 27 U/L — SIGNIFICANT CHANGE UP (ref 4–41)
ANION GAP SERPL CALC-SCNC: 14 MMOL/L — SIGNIFICANT CHANGE UP (ref 7–14)
APTT BLD: 25.1 SEC — SIGNIFICANT CHANGE UP (ref 24.5–35.6)
AST SERPL-CCNC: 26 U/L — SIGNIFICANT CHANGE UP (ref 4–40)
BILIRUB SERPL-MCNC: 0.8 MG/DL — SIGNIFICANT CHANGE UP (ref 0.2–1.2)
BUN SERPL-MCNC: 10 MG/DL — SIGNIFICANT CHANGE UP (ref 7–23)
CALCIUM SERPL-MCNC: 8.8 MG/DL — SIGNIFICANT CHANGE UP (ref 8.4–10.5)
CHLORIDE SERPL-SCNC: 98 MMOL/L — SIGNIFICANT CHANGE UP (ref 98–107)
CO2 SERPL-SCNC: 22 MMOL/L — SIGNIFICANT CHANGE UP (ref 22–31)
CREAT SERPL-MCNC: 0.87 MG/DL — SIGNIFICANT CHANGE UP (ref 0.5–1.3)
EGFR: 95 ML/MIN/1.73M2 — SIGNIFICANT CHANGE UP
EGFR: 95 ML/MIN/1.73M2 — SIGNIFICANT CHANGE UP
GLUCOSE SERPL-MCNC: 108 MG/DL — HIGH (ref 70–99)
HCT VFR BLD CALC: 27.1 % — LOW (ref 39–50)
HGB BLD-MCNC: 9.4 G/DL — LOW (ref 13–17)
INR BLD: 1.01 RATIO — SIGNIFICANT CHANGE UP (ref 0.85–1.16)
MAGNESIUM SERPL-MCNC: 1.9 MG/DL — SIGNIFICANT CHANGE UP (ref 1.6–2.6)
MCHC RBC-ENTMCNC: 33.2 PG — SIGNIFICANT CHANGE UP (ref 27–34)
MCHC RBC-ENTMCNC: 34.7 G/DL — SIGNIFICANT CHANGE UP (ref 32–36)
MCV RBC AUTO: 95.8 FL — SIGNIFICANT CHANGE UP (ref 80–100)
NRBC # BLD AUTO: 0 K/UL — SIGNIFICANT CHANGE UP (ref 0–0)
NRBC # FLD: 0 K/UL — SIGNIFICANT CHANGE UP (ref 0–0)
NRBC BLD AUTO-RTO: 0 /100 WBCS — SIGNIFICANT CHANGE UP (ref 0–0)
PHOSPHATE SERPL-MCNC: 3 MG/DL — SIGNIFICANT CHANGE UP (ref 2.5–4.5)
PLATELET # BLD AUTO: 174 K/UL — SIGNIFICANT CHANGE UP (ref 150–400)
POTASSIUM SERPL-MCNC: 4 MMOL/L — SIGNIFICANT CHANGE UP (ref 3.5–5.3)
POTASSIUM SERPL-SCNC: 4 MMOL/L — SIGNIFICANT CHANGE UP (ref 3.5–5.3)
PROT SERPL-MCNC: 6.3 G/DL — SIGNIFICANT CHANGE UP (ref 6–8.3)
PROTHROM AB SERPL-ACNC: 12 SEC — SIGNIFICANT CHANGE UP (ref 9.9–13.4)
RBC # BLD: 2.83 M/UL — LOW (ref 4.2–5.8)
RBC # FLD: 13.8 % — SIGNIFICANT CHANGE UP (ref 10.3–14.5)
SODIUM SERPL-SCNC: 134 MMOL/L — LOW (ref 135–145)
WBC # BLD: 5.95 K/UL — SIGNIFICANT CHANGE UP (ref 3.8–10.5)
WBC # FLD AUTO: 5.95 K/UL — SIGNIFICANT CHANGE UP (ref 3.8–10.5)

## 2025-04-06 RX ORDER — DILTIAZEM HYDROCHLORIDE 240 MG/1
1 TABLET, EXTENDED RELEASE ORAL
Qty: 0 | Refills: 0 | DISCHARGE
Start: 2025-04-06

## 2025-04-06 RX ORDER — MONTELUKAST SODIUM 10 MG/1
1 TABLET ORAL
Qty: 0 | Refills: 0 | DISCHARGE
Start: 2025-04-06

## 2025-04-06 RX ORDER — POLYETHYLENE GLYCOL 3350 17 G/17G
17 POWDER, FOR SOLUTION ORAL
Qty: 0 | Refills: 0 | DISCHARGE
Start: 2025-04-06

## 2025-04-06 RX ORDER — ALBUTEROL SULFATE 2.5 MG/3ML
2 VIAL, NEBULIZER (ML) INHALATION
Qty: 0 | Refills: 0 | DISCHARGE
Start: 2025-04-06

## 2025-04-06 RX ORDER — MAGNESIUM SULFATE 500 MG/ML
1 SYRINGE (ML) INJECTION ONCE
Refills: 0 | Status: COMPLETED | OUTPATIENT
Start: 2025-04-06 | End: 2025-04-06

## 2025-04-06 RX ORDER — HYDROCORTISONE 10 MG/G
1 CREAM TOPICAL
Qty: 0 | Refills: 0 | DISCHARGE
Start: 2025-04-06

## 2025-04-06 RX ORDER — IBUPROFEN 200 MG
1 TABLET ORAL
Qty: 0 | Refills: 0 | DISCHARGE

## 2025-04-06 RX ORDER — OXYCODONE HYDROCHLORIDE 30 MG/1
1 TABLET ORAL
Qty: 12 | Refills: 0
Start: 2025-04-06 | End: 2025-04-08

## 2025-04-06 RX ORDER — ACETAMINOPHEN 500 MG/5ML
3 LIQUID (ML) ORAL
Qty: 0 | Refills: 0 | DISCHARGE
Start: 2025-04-06

## 2025-04-06 RX ADMIN — Medication 975 MILLIGRAM(S): at 11:30

## 2025-04-06 RX ADMIN — Medication 1 DOSE(S): at 10:28

## 2025-04-06 RX ADMIN — Medication 975 MILLIGRAM(S): at 06:07

## 2025-04-06 RX ADMIN — HEPARIN SODIUM 5000 UNIT(S): 1000 INJECTION INTRAVENOUS; SUBCUTANEOUS at 05:38

## 2025-04-06 RX ADMIN — MONTELUKAST SODIUM 10 MILLIGRAM(S): 10 TABLET ORAL at 11:31

## 2025-04-06 RX ADMIN — Medication 975 MILLIGRAM(S): at 00:00

## 2025-04-06 RX ADMIN — Medication 40 MILLIGRAM(S): at 11:31

## 2025-04-06 RX ADMIN — DILTIAZEM HYDROCHLORIDE 120 MILLIGRAM(S): 240 TABLET, EXTENDED RELEASE ORAL at 05:38

## 2025-04-06 RX ADMIN — Medication 40 MILLIGRAM(S): at 05:38

## 2025-04-06 RX ADMIN — LIDOCAINE HYDROCHLORIDE 1 PATCH: 20 JELLY TOPICAL at 04:24

## 2025-04-06 RX ADMIN — Medication 100 GRAM(S): at 10:28

## 2025-04-06 RX ADMIN — Medication 975 MILLIGRAM(S): at 05:37

## 2025-04-06 NOTE — PROGRESS NOTE ADULT - ASSESSMENT
69y male with PMHx of prostate cancer (2014) s/p radiation, HTN HLD aortic aneurysm, colonic polyps, asthma (mild intermittent), GERD with reflux esophagitis, lung nodule, diagnosis of intrahepatic bile duct carcinoma (cholangiocarcinoma). Pt completed chemo 2 weeks ago (has a right chest chemo port). Now s/p ex lap, HAIP, celiac LN Bx and girish on 4/2.      PLAN  - Monitor abdominal exam and bowel function  - Pain control - PCA, tylenol  - Strict I & Os  - Diet: CLD  - LR @42  - DVT ppx: SQH  - Monitor incisions    D Team Surgery  [id number]
69y male with PMHx of prostate cancer (2014) s/p radiation, HTN HLD aortic aneurysm, colonic polyps, asthma (mild intermittent), GERD with reflux esophagitis, lung nodule, diagnosis of intrahepatic bile duct carcinoma (cholangiocarcinoma). Pt completed chemo 2 weeks ago (has a right chest chemo port). Now s/p ex lap, HAIP, celiac LN Bx and girish on 4/2.      PLAN  - Monitor abdominal exam and bowel function  - off PCA  - Pain control - on tylenol  - Strict I & Os  - Diet: reg  - bowel reg  - LR @42  - DVT ppx: SQH  - Monitor incisions    D Team Surgery  [id number]
69y male with PMHx of prostate cancer (2014) s/p radiation, HTN HLD aortic aneurysm, colonic polyps, asthma (mild intermittent), GERD with reflux esophagitis, lung nodule, diagnosis of intrahepatic bile duct carcinoma (cholangiocarcinoma). Pt completed chemo 2 weeks ago (has a right chest chemo port). Now s/p ex lap, HAIP, celiac LN Bx and girish on 4/2.      PLAN  - Monitor abdominal exam and bowel function  - Pain control  - Strict I and Os  - Diet: NPO + sips   - LR @100   - DVT ppx: SQH  - Monitor incisions    D Team Surgery  [id number]
69y male with PMHx of prostate cancer (2014) s/p radiation, HTN HLD aortic aneurysm, colonic polyps, asthma (mild intermittent), GERD with reflux esophagitis, lung nodule, diagnosis of intrahepatic bile duct carcinoma (cholangiocarcinoma). Pt completed chemo 2 weeks ago (has a right chest chemo port). Now s/p ex lap, HAIP, celiac LN Bx and girish on 4/2.  Recovering well.     PLAN  - Monitor abdominal exam and bowel function  - Pain control - on tylenol  - Strict I & Os  - Diet: reg  - bowel reg  - DVT ppx: SQH  - Monitor incisions  - discharge today    D Team Surgery  [id number]

## 2025-04-06 NOTE — DISCHARGE NOTE NURSING/CASE MANAGEMENT/SOCIAL WORK - FINANCIAL ASSISTANCE
[facility] provides services at a reduced cost to those who are determined to be eligible through [facility]’s financial assistance program. Information regarding [facility]’s financial assistance program can be found by going to [email or web address] or by calling [phone].

## 2025-04-06 NOTE — DISCHARGE NOTE NURSING/CASE MANAGEMENT/SOCIAL WORK - NSDCPEFALRISK_GEN_ALL_CORE
For information on Fall & Injury Prevention, visit: [email or web address] OR  [email or web address] OR  https://www.cdc.gov/steadi/patient.html

## 2025-04-06 NOTE — PROGRESS NOTE ADULT - SUBJECTIVE AND OBJECTIVE BOX
Anesthesia Pain Management Service    SUBJECTIVE: Pt doing well with PCEA without problems reported.    Therapy:	  [ ] IV PCA	   [ X] Epidural           [ ] s/p Spinal Opoid              [ ] Postpartum infusion	  [ ] Patient controlled regional anesthesia (PCRA)    [ ] prn Analgesics    Allergies    peanuts (Stomach Upset; Other)  citrus (Other)  Sesame (Other)  avocado (Stomach Upset)  Nuts (Other; Rash)  aspirin (Other)  penicillins (Other)    Intolerances      MEDICATIONS  (STANDING):  acetaminophen   IVPB .. 1000 milliGRAM(s) IV Intermittent every 6 hours  diltiazem    milliGRAM(s) Oral daily  famotidine    Tablet 40 milliGRAM(s) Oral daily  fluticasone propionate/ salmeterol 100-50 MICROgram(s) Diskus 1 Dose(s) Inhalation two times a day  heparin   Injectable 5000 Unit(s) SubCutaneous every 8 hours  hydromorphone (10 MICROgram(s)/mL) + bupivacaine 0.0625% in 0.9% Sodium Chloride PCEA 250 milliLiter(s) Epidural PCA Continuous  lactated ringers. 1000 milliLiter(s) (42 mL/Hr) IV Continuous <Continuous>  montelukast 10 milliGRAM(s) Oral daily  pantoprazole    Tablet 40 milliGRAM(s) Oral before breakfast    MEDICATIONS  (PRN):  albuterol    90 MICROgram(s) HFA Inhaler 2 Puff(s) Inhalation every 6 hours PRN Shortness of Breath and/or Wheezing  hydromorphone (10 MICROgram(s)/mL) + bupivacaine 0.0625% in 0.9% Sodium Chloride PCEA Rescue Clinician  Bolus 5 milliLiter(s) Epidural every 15 minutes PRN for Pain Score greater than 6  naloxone Injectable 0.1 milliGRAM(s) IV Push every 3 minutes PRN For ANY of the following changes in patient status:  A. RR LESS THAN 10 breaths per minute, B. Oxygen saturation LESS THAN 90%, C. Sedation score of 6  ondansetron Injectable 4 milliGRAM(s) IV Push every 6 hours PRN Nausea      OBJECTIVE:   [X] No new signs     [ ] Other:    Side Effects:  [X ] None			[ ] Other:    Assessment of Catheter Site:		[ X] Intact		[ ] Other:    ASSESSMENT/PLAN  [ X] Continue current therapy    [ ] Therapy changed to:    [ ] IV PCA       [ ] Epidural     [ ] prn Analgesics     Comments: Continue current PCEA settings.
TEAM [ ** ] Surgery Daily Progress Note  =====================================================    SUBJECTIVE: Patient seen and examined at bedside on AM rounds. Patient reports that they have mild abdominal cramping, no tenderness. Tolerating diet, denies nausea, vomiting.  -  Flatus/ -   BM. OOB/Amublating Denies fever, chills.    PMH:   PAST MEDICAL & SURGICAL HISTORY:  Lung nodule      Reflux esophagitis      Colonic polyp      Malignant neoplasm of prostate      Intrahepatic bile duct carcinoma      History of aortic aneurysm      Dilated aortic root      Asthma      Migraine      History of chemotherapy      History of radiation therapy      H/O colonoscopy      H/O endoscopy      History of liver biopsy      History of lung biopsy      History of vascular access device          ALLERGIES:  peanuts (Stomach Upset; Other)  citrus (Other)  Sesame (Other)  avocado (Stomach Upset)  Nuts (Other; Rash)  aspirin (Other)  penicillins (Other)      --------------------------------------------------------------------------------------    MEDICATIONS:    Neurologic Medications  acetaminophen     Tablet .. 975 milliGRAM(s) Oral every 6 hours  HYDROmorphone  Injectable 0.25 milliGRAM(s) IV Push every 6 hours PRN Breakthrough pain  ondansetron Injectable 4 milliGRAM(s) IV Push every 6 hours PRN Nausea  oxyCODONE    IR 2.5 milliGRAM(s) Oral every 6 hours PRN Moderate Pain (4 - 6)  oxyCODONE    IR 5 milliGRAM(s) Oral every 6 hours PRN Severe Pain (7 - 10)    Respiratory Medications  albuterol    90 MICROgram(s) HFA Inhaler 2 Puff(s) Inhalation every 6 hours PRN Shortness of Breath and/or Wheezing  fluticasone propionate/ salmeterol 100-50 MICROgram(s) Diskus 1 Dose(s) Inhalation two times a day  montelukast 10 milliGRAM(s) Oral daily    Cardiovascular Medications  diltiazem    milliGRAM(s) Oral daily    Gastrointestinal Medications  famotidine    Tablet 40 milliGRAM(s) Oral daily  pantoprazole    Tablet 40 milliGRAM(s) Oral before breakfast  senna 2 Tablet(s) Oral at bedtime    Genitourinary Medications    Hematologic/Oncologic Medications  heparin   Injectable 5000 Unit(s) SubCutaneous every 8 hours    Antimicrobial/Immunologic Medications    Endocrine/Metabolic Medications    Topical/Other Medications  benzocaine/menthol Lozenge 1 Lozenge Oral two times a day  hydrocortisone 2.5% Cream 1 Application(s) Topical two times a day  lidocaine   4% Patch 1 Patch Transdermal every 24 hours  lidocaine   4% Patch 1 Patch Transdermal every 24 hours  naloxone Injectable 0.1 milliGRAM(s) IV Push every 3 minutes PRN For ANY of the following changes in patient status:  A. RR LESS THAN 10 breaths per minute, B. Oxygen saturation LESS THAN 90%, C. Sedation score of 6    --------------------------------------------------------------------------------------    VITAL SIGNS:    T(C): 36.4 (04-05-25 @ 12:30), Max: 37.1 (04-04-25 @ 20:26)  HR: 72 (04-05-25 @ 12:30) (68 - 90)  BP: 135/75 (04-05-25 @ 12:30) (112/72 - 144/82)  RR: 18 (04-05-25 @ 12:30) (17 - 19)  SpO2: 100% (04-05-25 @ 12:30) (95% - 100%)    --------------------------------------------------------------------------------------    EXAM    General: NAD, resting in bed comfortably.  Cardiac: regular rate, warm and well perfused  Respiratory: Nonlabored respirations, normal cw expansion.  Abdomen: soft, nontender, nondistended. surgical incision is c/d/i  Extremities: normal strength, FROM, no deformities      --------------------------------------------------------------------------------------    LABS          --------------------------------------------------------------------------------------    INS AND OUTS:    04-04-25 @ 07:01  -  04-05-25 @ 07:00  --------------------------------------------------------  IN: 1670 mL / OUT: 1900 mL / NET: -230 mL    04-05-25 @ 07:01  -  04-05-25 @ 15:05  --------------------------------------------------------  IN: 720 mL / OUT: 600 mL / NET: 120 mL        --------------------------------------------------------------------------------------
Anesthesia Pain Management Service: Day _2_ of Epidural    SUBJECTIVE: Patient doing well with PCEA and no problems. Slept well, had no difficulty moving from bed to chair. Denies headache, able to move all extremities, no numbness or tingling. Denies pain at site of epidural.  Pain Scale Score:   Refer to charted pain scores    THERAPY:  [x ] Epidural Bupivacaine 0.0625% and Hydromorphone  		[ X] 10 micrograms/mL	[ ] 5 micrograms/mL  [ ] Epidural Bupivacaine 0.0625% and Fentanyl - 2 micrograms/mL  [ ] Epidural Ropivacaine 0.1% plain – 1 mg/mL  [ ] Patient Controlled Regional Anesthesia (PCRA) Ropivacaine  		[ ] 0.2%			[ ] 0.1%    Demand dose __3_ lockout __15_ (minutes) Continuous Rate _7__ Total: __162.7__ ml used (in past 24 hours)      MEDICATIONS  (STANDING):  acetaminophen   IVPB .. 1000 milliGRAM(s) IV Intermittent every 6 hours  diltiazem    milliGRAM(s) Oral daily  famotidine    Tablet 40 milliGRAM(s) Oral daily  fluticasone propionate/ salmeterol 100-50 MICROgram(s) Diskus 1 Dose(s) Inhalation two times a day  heparin   Injectable 5000 Unit(s) SubCutaneous every 8 hours  hydromorphone (10 MICROgram(s)/mL) + bupivacaine 0.0625% in 0.9% Sodium Chloride PCEA 250 milliLiter(s) Epidural PCA Continuous  lactated ringers. 1000 milliLiter(s) (42 mL/Hr) IV Continuous <Continuous>  montelukast 10 milliGRAM(s) Oral daily  pantoprazole    Tablet 40 milliGRAM(s) Oral before breakfast    MEDICATIONS  (PRN):  albuterol    90 MICROgram(s) HFA Inhaler 2 Puff(s) Inhalation every 6 hours PRN Shortness of Breath and/or Wheezing  hydromorphone (10 MICROgram(s)/mL) + bupivacaine 0.0625% in 0.9% Sodium Chloride PCEA Rescue Clinician  Bolus 5 milliLiter(s) Epidural every 15 minutes PRN for Pain Score greater than 6  naloxone Injectable 0.1 milliGRAM(s) IV Push every 3 minutes PRN For ANY of the following changes in patient status:  A. RR LESS THAN 10 breaths per minute, B. Oxygen saturation LESS THAN 90%, C. Sedation score of 6  ondansetron Injectable 4 milliGRAM(s) IV Push every 6 hours PRN Nausea      OBJECTIVE: Patient sitting in chair.    Assessment of Catheter Site:	[ ] Left	[ ] Right  [x ] Epidural 	[ ] Femoral	      [ ] Saphenous   [ ] Supraclavicular   [ ] Other:    [x ] Dressing intact	[x ] Site non-tender	[ x] Site without erythema, discharge, edema  [x ] Epidural tubing and connection checked	[x] Gross neurological exam within normal limits  [ ] Catheter removed – tip intact		[ ] Afebrile  	[ ] Febrile: ___   [ X] see Temp under VS below)    PT/INR - ( 03 Apr 2025 05:45 )   PT: 12.1 sec;   INR: 1.04 ratio         PTT - ( 03 Apr 2025 05:45 )  PTT:25.5 sec                      10.5   7.96  )-----------( 163      ( 03 Apr 2025 05:45 )             30.9     Vital Signs Last 24 Hrs  T(C): 36.3 (04-03-25 @ 08:15), Max: 37 (04-02-25 @ 23:58)  T(F): 97.4 (04-03-25 @ 08:15), Max: 98.6 (04-02-25 @ 23:58)  HR: 56 (04-03-25 @ 08:15) (51 - 84)  BP: 109/67 (04-03-25 @ 08:15) (96/69 - 134/85)  BP(mean): 108 (04-02-25 @ 19:00) (78 - 108)  RR: 19 (04-03-25 @ 08:15) (10 - 25)  SpO2: 100% (04-03-25 @ 08:15) (98% - 100%)      Sedation Score:	[x ] Alert	[ ] Drowsy	[ ] Arousable	[ ] Asleep	[ ] Unresponsive    Side Effects:	[x ] None	[ ] Nausea	[ ] Vomiting	[ ] Pruritus  		[ ] Weakness		[ ] Numbness	[ ] Other:    ASSESSMENT/ PLAN:    Therapy to  be:	[x ] Continue   [ ] Discontinued   [ ] Change to prn Analgesics    Documentation and Verification of current medications:  [ X ] Done	[ ] Not done, not eligible, reason:    Comments: Doing OK with epidural and may continue.    Progress Note written now but Patient was seen earlier.
Anesthesia Pain Management Service: Day _3_ of Epidural    SUBJECTIVE: Patient doing well with PCEA and no problems. Reports bilateral shoulder pain after walking today. Noticed he experienced similar pain episode 3 weeks ago after waking up in a hotel [place]. He then took some Advil and it went away. Patient reports ice packs help land after using PCEA demand dose, pain subsided to 2/10 but was 8/10 this morning.  Pain Scale Score: 0 surgical pain  Refer to charted pain scores    THERAPY:  [x ] Epidural Bupivacaine 0.0625% and Hydromorphone  		[ X] 10 micrograms/mL	[ ] 5 micrograms/mL  [ ] Epidural Bupivacaine 0.0625% and Fentanyl - 2 micrograms/mL  [ ] Epidural Ropivacaine 0.1% plain – 1 mg/mL  [ ] Patient Controlled Regional Anesthesia (PCRA) Ropivacaine  		[ ] 0.2%			[ ] 0.1%    Demand dose __3_ lockout __15_ (minutes) Continuous Rate _7__ Total: __176.75__ ml used (in past 24 hours)      MEDICATIONS  (STANDING):  acetaminophen   IVPB .. 1000 milliGRAM(s) IV Intermittent every 6 hours  diltiazem    milliGRAM(s) Oral daily  famotidine    Tablet 40 milliGRAM(s) Oral daily  fluticasone propionate/ salmeterol 100-50 MICROgram(s) Diskus 1 Dose(s) Inhalation two times a day  heparin   Injectable 5000 Unit(s) SubCutaneous every 8 hours  hydromorphone (10 MICROgram(s)/mL) + bupivacaine 0.0625% in 0.9% Sodium Chloride PCEA 250 milliLiter(s) Epidural PCA Continuous  lactated ringers. 1000 milliLiter(s) (42 mL/Hr) IV Continuous <Continuous>  montelukast 10 milliGRAM(s) Oral daily  pantoprazole    Tablet 40 milliGRAM(s) Oral before breakfast    MEDICATIONS  (PRN):  albuterol    90 MICROgram(s) HFA Inhaler 2 Puff(s) Inhalation every 6 hours PRN Shortness of Breath and/or Wheezing  hydromorphone (10 MICROgram(s)/mL) + bupivacaine 0.0625% in 0.9% Sodium Chloride PCEA Rescue Clinician  Bolus 5 milliLiter(s) Epidural every 15 minutes PRN for Pain Score greater than 6  naloxone Injectable 0.1 milliGRAM(s) IV Push every 3 minutes PRN For ANY of the following changes in patient status:  A. RR LESS THAN 10 breaths per minute, B. Oxygen saturation LESS THAN 90%, C. Sedation score of 6  ondansetron Injectable 4 milliGRAM(s) IV Push every 6 hours PRN Nausea      OBJECTIVE: Patient sitting in chair    Assessment of Catheter Site:	[ ] Left	[ ] Right  [x ] Epidural 	[ ] Femoral	      [ ] Saphenous   [ ] Supraclavicular   [ ] Other:    [x ] Dressing intact	[x ] Site non-tender	[ x] Site without erythema, discharge, edema  [x ] Epidural tubing and connection checked	[x] Gross neurological exam within normal limits  [ ] Catheter removed – tip intact		[ ] Afebrile  	[ ] Febrile: ___   [ X] see Temp under VS below)    PT/INR - ( 04 Apr 2025 06:32 )   PT: 12.9 sec;   INR: 1.08 ratio         PTT - ( 04 Apr 2025 06:32 )  PTT:24.1 sec                      9.6    6.48  )-----------( 148      ( 04 Apr 2025 06:32 )             28.3     Vital Signs Last 24 Hrs  T(C): 36.6 (04-04-25 @ 09:15), Max: 36.6 (04-03-25 @ 23:39)  T(F): 97.9 (04-04-25 @ 09:15), Max: 97.9 (04-04-25 @ 09:15)  HR: 76 (04-04-25 @ 09:15) (48 - 77)  BP: 107/60 (04-04-25 @ 09:15) (107/60 - 111/67)  BP(mean): --  RR: 16 (04-04-25 @ 09:15) (16 - 18)  SpO2: 100% (04-04-25 @ 09:15) (94% - 100%)      Sedation Score:	[x ] Alert	[ ] Drowsy	[ ] Arousable	[ ] Asleep	[ ] Unresponsive    Side Effects:	[x ] None	[ ] Nausea	[ ] Vomiting	[ ] Pruritus  		[ ] Weakness		[ ] Numbness	[ ] Other:    ASSESSMENT/ PLAN:    Therapy to  be:	[x ] Continue   [ ] Discontinued   [ ] Change to prn Analgesics    Documentation and Verification of current medications:  [ X ] Done	[ ] Not done, not eligible, reason:    Comments: Doing OK with epidural and may continue. Per primary team, plan for PCEA d/c tomorrow. Lidocaine patches ordered for shoulders. May consider imaging of upper extremities if pain persistent.    Progress Note written now but Patient was seen earlier.
SURGERY DAILY PROGRESS NOTE:     Overnight Events:  No acute events overnight.    SUBJECTIVE:   Patient seen and evaluated on AM rounds.   Pt is resting comfortably in bed with no complaints.   Denies fever, chills, N/V, chest pain, or shortness of breath.   Patient is passing gas and having bowel movements.   Tolerating diet.   Ambulating well.   Pain is adequately controlled on current regimen.  Discussed discharge pain medication regimen with the patient at length    OBJECTIVE:  Vital Signs Last 24 Hrs  T(C): 36.4 (06 Apr 2025 08:10), Max: 36.5 (06 Apr 2025 00:06)  T(F): 97.5 (06 Apr 2025 08:10), Max: 97.7 (06 Apr 2025 00:06)  HR: 79 (06 Apr 2025 08:10) (68 - 79)  BP: 120/71 (06 Apr 2025 08:10) (117/68 - 135/75)  BP(mean): --  RR: 16 (06 Apr 2025 08:10) (16 - 18)  SpO2: 95% (06 Apr 2025 08:10) (95% - 100%)    Parameters below as of 06 Apr 2025 08:10  Patient On (Oxygen Delivery Method): room air      I&O's Detail    05 Apr 2025 07:01  -  06 Apr 2025 07:00  --------------------------------------------------------  IN:    Oral Fluid: 1600 mL  Total IN: 1600 mL    OUT:    Voided (mL): 1850 mL  Total OUT: 1850 mL    Total NET: -250 mL      06 Apr 2025 07:01  -  06 Apr 2025 09:45  --------------------------------------------------------  IN:    Oral Fluid: 240 mL  Total IN: 240 mL    OUT:    Voided (mL): 300 mL  Total OUT: 300 mL    Total NET: -60 mL        Daily     Daily     LABS:                        9.4    5.95  )-----------( 174      ( 06 Apr 2025 06:34 )             27.1     04-06    134[L]  |  98  |  10  ----------------------------<  108[H]  4.0   |  22  |  0.87    Ca    8.8      06 Apr 2025 06:34  Phos  3.0     04-06  Mg     1.90     04-06    TPro  6.3  /  Alb  3.5  /  TBili  0.8  /  DBili  x   /  AST  26  /  ALT  27  /  AlkPhos  102  04-06    PT/INR - ( 06 Apr 2025 06:34 )   PT: 12.0 sec;   INR: 1.01 ratio         PTT - ( 06 Apr 2025 06:34 )  PTT:25.1 sec  Urinalysis Basic - ( 06 Apr 2025 06:34 )    Color: x / Appearance: x / SG: x / pH: x  Gluc: 108 mg/dL / Ketone: x  / Bili: x / Urobili: x   Blood: x / Protein: x / Nitrite: x   Leuk Esterase: x / RBC: x / WBC x   Sq Epi: x / Non Sq Epi: x / Bacteria: x        PHYSICAL EXAM:  Constitutional: Well developed, well nourished, NAD  Pulmonary: Symmetric chest rise bilaterally, no increased WOB  Gastrointestinal: Abdomen soft, nontender, nondistended. No rebound or guarding. No obvious masses. Incision is c/d/i  Groin: Soft, nontender, no ecchymosis/hematoma, no erythema, no edema.  Extremities:  FROM, warm to touch, no clubbing/cyanosis/erythema/edema      
TEAM [ D ] Surgery Daily Progress Note  =====================================================    SUBJECTIVE: Patient seen and examined at bedside on AM rounds. Patient reports that they're feeling well. NPO + sips, denies nausea, vomiting. Denies fever, chills.    ALLERGIES:  peanuts (Stomach Upset; Other)  citrus (Other)  Sesame (Other)  avocado (Stomach Upset)  Nuts (Other; Rash)  aspirin (Other)  penicillins (Other)      --------------------------------------------------------------------------------------    MEDICATIONS:    Neurologic Medications  acetaminophen   IVPB .. 1000 milliGRAM(s) IV Intermittent every 6 hours  hydromorphone (10 MICROgram(s)/mL) + bupivacaine 0.0625% in 0.9% Sodium Chloride PCEA 250 milliLiter(s) Epidural PCA Continuous  hydromorphone (10 MICROgram(s)/mL) + bupivacaine 0.0625% in 0.9% Sodium Chloride PCEA Rescue Clinician  Bolus 5 milliLiter(s) Epidural every 15 minutes PRN for Pain Score greater than 6  ondansetron Injectable 4 milliGRAM(s) IV Push every 6 hours PRN Nausea    Respiratory Medications  albuterol    90 MICROgram(s) HFA Inhaler 2 Puff(s) Inhalation every 6 hours PRN Shortness of Breath and/or Wheezing  fluticasone propionate/ salmeterol 100-50 MICROgram(s) Diskus 1 Dose(s) Inhalation two times a day  montelukast 10 milliGRAM(s) Oral daily    Cardiovascular Medications  diltiazem    milliGRAM(s) Oral daily    Gastrointestinal Medications  famotidine    Tablet 40 milliGRAM(s) Oral daily  lactated ringers. 1000 milliLiter(s) IV Continuous <Continuous>  pantoprazole    Tablet 40 milliGRAM(s) Oral before breakfast    Genitourinary Medications    Hematologic/Oncologic Medications  heparin   Injectable 5000 Unit(s) SubCutaneous every 8 hours    Antimicrobial/Immunologic Medications    Endocrine/Metabolic Medications    Topical/Other Medications  naloxone Injectable 0.1 milliGRAM(s) IV Push every 3 minutes PRN For ANY of the following changes in patient status:  A. RR LESS THAN 10 breaths per minute, B. Oxygen saturation LESS THAN 90%, C. Sedation score of 6    --------------------------------------------------------------------------------------    VITAL SIGNS:  T(C): 36.4 (04-03-25 @ 04:40), Max: 37 (04-02-25 @ 23:58)  HR: 60 (04-03-25 @ 04:40) (51 - 84)  BP: 111/66 (04-03-25 @ 04:40) (96/69 - 134/85)  RR: 18 (04-03-25 @ 04:40) (10 - 25)  SpO2: 100% (04-03-25 @ 04:40) (98% - 100%)  --------------------------------------------------------------------------------------    EXAM    General: NAD, resting in bed comfortably.  Cardiac: regular rate, warm and well perfused  Respiratory: Nonlabored respirations, normal cw expansion.  Abdomen: soft, nontender, nondistended. surgical incision is c/d/isreal hall.   Extremities: normal strength, FROM, no deformities    --------------------------------------------------------------------------------------    LABS:                        10.5   7.96  )-----------( 163      ( 03 Apr 2025 05:45 )             30.9     04-03    134[L]  |  102  |  15  ----------------------------<  107[H]  5.3   |  21[L]  |  1.10    Ca    8.7      03 Apr 2025 05:45  Phos  4.4     04-03  Mg     2.00     04-03    TPro  5.9[L]  /  Alb  3.5  /  TBili  0.9  /  DBili  x   /  AST  35  /  ALT  34  /  AlkPhos  88  04-03    PT/INR - ( 03 Apr 2025 05:45 )   PT: 12.1 sec;   INR: 1.04 ratio         PTT - ( 03 Apr 2025 05:45 )  PTT:25.5 sec  Urinalysis Basic - ( 03 Apr 2025 05:45 )    Color: x / Appearance: x / SG: x / pH: x  Gluc: 107 mg/dL / Ketone: x  / Bili: x / Urobili: x   Blood: x / Protein: x / Nitrite: x   Leuk Esterase: x / RBC: x / WBC x   Sq Epi: x / Non Sq Epi: x / Bacteria: x      --------------------------------------------------------------------------------------    INS AND OUTS:    04-02-25 @ 07:01  -  04-03-25 @ 07:00  --------------------------------------------------------  IN: 1900 mL / OUT: 2725 mL / NET: -825 mL      --------------------------------------------------------------------------------------
TEAM [ D ] Surgery Daily Progress Note  =====================================================    SUBJECTIVE: Patient seen and examined at bedside on AM rounds. Patient reports that they're feeling well. States shoulder pain which is resolved with pain medications and feeling hot, possibly d/t anxiety. Tolerating diet, denies nausea, vomiting.  OOB/Ambulating as tolerated. Denies fever, chills.    ALLERGIES:  peanuts (Stomach Upset; Other)  citrus (Other)  Sesame (Other)  avocado (Stomach Upset)  Nuts (Other; Rash)  aspirin (Other)  penicillins (Other)      --------------------------------------------------------------------------------------    MEDICATIONS:    Neurologic Medications  acetaminophen   IVPB .. 1000 milliGRAM(s) IV Intermittent every 6 hours  hydromorphone (10 MICROgram(s)/mL) + bupivacaine 0.0625% in 0.9% Sodium Chloride PCEA 250 milliLiter(s) Epidural PCA Continuous  hydromorphone (10 MICROgram(s)/mL) + bupivacaine 0.0625% in 0.9% Sodium Chloride PCEA Rescue Clinician  Bolus 5 milliLiter(s) Epidural every 15 minutes PRN for Pain Score greater than 6  ondansetron Injectable 4 milliGRAM(s) IV Push every 6 hours PRN Nausea    Respiratory Medications  albuterol    90 MICROgram(s) HFA Inhaler 2 Puff(s) Inhalation every 6 hours PRN Shortness of Breath and/or Wheezing  fluticasone propionate/ salmeterol 100-50 MICROgram(s) Diskus 1 Dose(s) Inhalation two times a day  montelukast 10 milliGRAM(s) Oral daily    Cardiovascular Medications  diltiazem    milliGRAM(s) Oral daily    Gastrointestinal Medications  famotidine    Tablet 40 milliGRAM(s) Oral daily  lactated ringers. 1000 milliLiter(s) IV Continuous <Continuous>  pantoprazole    Tablet 40 milliGRAM(s) Oral before breakfast    Genitourinary Medications    Hematologic/Oncologic Medications  heparin   Injectable 5000 Unit(s) SubCutaneous every 8 hours    Antimicrobial/Immunologic Medications    Endocrine/Metabolic Medications    Topical/Other Medications  naloxone Injectable 0.1 milliGRAM(s) IV Push every 3 minutes PRN For ANY of the following changes in patient status:  A. RR LESS THAN 10 breaths per minute, B. Oxygen saturation LESS THAN 90%, C. Sedation score of 6    --------------------------------------------------------------------------------------    VITAL SIGNS:  T(C): 36.5 (04-04-25 @ 05:09), Max: 36.6 (04-03-25 @ 23:39)  HR: 70 (04-04-25 @ 05:09) (48 - 77)  BP: 108/60 (04-04-25 @ 05:09) (108/60 - 111/67)  RR: 18 (04-04-25 @ 05:09) (17 - 19)  SpO2: 100% (04-04-25 @ 05:09) (94% - 100%)  --------------------------------------------------------------------------------------    EXAM    General: NAD, resting in bed comfortably.  Cardiac: regular rate, warm and well perfused  Respiratory: Nonlabored respirations, normal cw expansion.  Abdomen: soft, nontender, nondistended. surgical incision is c/d/i  Extremities: normal strength, FROM, no deformities    --------------------------------------------------------------------------------------    LABS:                        10.5   7.96  )-----------( 163      ( 03 Apr 2025 05:45 )             30.9     04-03    134[L]  |  102  |  15  ----------------------------<  107[H]  5.3   |  21[L]  |  1.10    Ca    8.7      03 Apr 2025 05:45  Phos  4.4     04-03  Mg     2.00     04-03    TPro  5.9[L]  /  Alb  3.5  /  TBili  0.9  /  DBili  x   /  AST  35  /  ALT  34  /  AlkPhos  88  04-03    PT/INR - ( 03 Apr 2025 05:45 )   PT: 12.1 sec;   INR: 1.04 ratio         PTT - ( 03 Apr 2025 05:45 )  PTT:25.5 sec  Urinalysis Basic - ( 03 Apr 2025 05:45 )    Color: x / Appearance: x / SG: x / pH: x  Gluc: 107 mg/dL / Ketone: x  / Bili: x / Urobili: x   Blood: x / Protein: x / Nitrite: x   Leuk Esterase: x / RBC: x / WBC x   Sq Epi: x / Non Sq Epi: x / Bacteria: x      --------------------------------------------------------------------------------------    INS AND OUTS:    04-02-25 @ 07:01  -  04-03-25 @ 07:00  --------------------------------------------------------  IN: 1900 mL / OUT: 2725 mL / NET: -825 mL    04-03-25 @ 07:01  -  04-04-25 @ 06:52  --------------------------------------------------------  IN: 1666 mL / OUT: 1410 mL / NET: 256 mL      --------------------------------------------------------------------------------------
none

## 2025-04-06 NOTE — DISCHARGE NOTE NURSING/CASE MANAGEMENT/SOCIAL WORK - PATIENT PORTAL LINK FT
You can access the FollowMyHealth Patient Portal offered by [facility] by registering at the following website: [email or web address]. By joining [employer]’s FollowMyHealth portal, you will also be able to view your health information using other applications (apps) compatible with our system.

## 2025-04-07 ENCOUNTER — NON-APPOINTMENT (OUTPATIENT)
Age: 70
End: 2025-04-07

## 2025-04-09 ENCOUNTER — APPOINTMENT (OUTPATIENT)
Dept: NUCLEAR MEDICINE | Facility: HOSPITAL | Age: 70
End: 2025-04-09
Payer: MEDICARE

## 2025-04-09 ENCOUNTER — OUTPATIENT (OUTPATIENT)
Dept: OUTPATIENT SERVICES | Facility: HOSPITAL | Age: 70
LOS: 1 days | End: 2025-04-09
Payer: MEDICARE

## 2025-04-09 DIAGNOSIS — Z98.890 OTHER SPECIFIED POSTPROCEDURAL STATES: Chronic | ICD-10-CM

## 2025-04-09 DIAGNOSIS — Z00.00 ENCOUNTER FOR GENERAL ADULT MEDICAL EXAMINATION WITHOUT ABNORMAL FINDINGS: ICD-10-CM

## 2025-04-09 DIAGNOSIS — Z92.3 PERSONAL HISTORY OF IRRADIATION: Chronic | ICD-10-CM

## 2025-04-09 DIAGNOSIS — Z92.21 PERSONAL HISTORY OF ANTINEOPLASTIC CHEMOTHERAPY: Chronic | ICD-10-CM

## 2025-04-09 PROCEDURE — 78830 RP LOCLZJ TUM SPECT W/CT 1: CPT

## 2025-04-09 PROCEDURE — A9540: CPT

## 2025-04-09 PROCEDURE — 78830 RP LOCLZJ TUM SPECT W/CT 1: CPT | Mod: 26

## 2025-04-14 ENCOUNTER — OUTPATIENT (OUTPATIENT)
Dept: OUTPATIENT SERVICES | Facility: HOSPITAL | Age: 70
LOS: 1 days | End: 2025-04-14
Payer: MEDICARE

## 2025-04-14 ENCOUNTER — APPOINTMENT (OUTPATIENT)
Dept: HEMATOLOGY ONCOLOGY | Facility: CLINIC | Age: 70
End: 2025-04-14
Payer: MEDICARE

## 2025-04-14 ENCOUNTER — APPOINTMENT (OUTPATIENT)
Dept: INFUSION THERAPY | Facility: CLINIC | Age: 70
End: 2025-04-14

## 2025-04-14 VITALS
OXYGEN SATURATION: 96 % | WEIGHT: 149 LBS | HEART RATE: 64 BPM | BODY MASS INDEX: 20.18 KG/M2 | SYSTOLIC BLOOD PRESSURE: 119 MMHG | HEIGHT: 72 IN | DIASTOLIC BLOOD PRESSURE: 78 MMHG | RESPIRATION RATE: 18 BRPM | TEMPERATURE: 97.7 F

## 2025-04-14 VITALS
RESPIRATION RATE: 17 BRPM | SYSTOLIC BLOOD PRESSURE: 126 MMHG | HEART RATE: 59 BPM | OXYGEN SATURATION: 100 % | WEIGHT: 149.03 LBS | TEMPERATURE: 97 F | DIASTOLIC BLOOD PRESSURE: 79 MMHG | HEIGHT: 72 IN

## 2025-04-14 DIAGNOSIS — Z98.890 OTHER SPECIFIED POSTPROCEDURAL STATES: Chronic | ICD-10-CM

## 2025-04-14 DIAGNOSIS — C22.1 INTRAHEPATIC BILE DUCT CARCINOMA: ICD-10-CM

## 2025-04-14 DIAGNOSIS — Z92.3 PERSONAL HISTORY OF IRRADIATION: Chronic | ICD-10-CM

## 2025-04-14 DIAGNOSIS — Z92.21 PERSONAL HISTORY OF ANTINEOPLASTIC CHEMOTHERAPY: Chronic | ICD-10-CM

## 2025-04-14 LAB
ALBUMIN SERPL ELPH-MCNC: 3.4 G/DL
ALP BLD-CCNC: 221 U/L
ALT SERPL-CCNC: 78 U/L
ANION GAP SERPL CALC-SCNC: 1 MMOL/L
AST SERPL-CCNC: 48 U/L
BILIRUB SERPL-MCNC: 1 MG/DL
BUN SERPL-MCNC: 19 MG/DL
CALCIUM SERPL-MCNC: 9.3 MG/DL
CHLORIDE SERPL-SCNC: 108 MMOL/L
CO2 SERPL-SCNC: 28 MMOL/L
CREAT SERPL-MCNC: 0.9 MG/DL
EGFRCR SERPLBLD CKD-EPI 2021: 94 ML/MIN/1.73M2
GLUCOSE SERPL-MCNC: 103 MG/DL
HCT VFR BLD CALC: 30 %
HGB BLD-MCNC: 10.3 G/DL
LYMPHOCYTES # BLD AUTO: 1 K/UL
LYMPHOCYTES NFR BLD AUTO: 18.4 %
MAGNESIUM SERPL-MCNC: 2.1 MG/DL
MAN DIFF?: NO
MCHC RBC-ENTMCNC: 32.9 PG
MCHC RBC-ENTMCNC: 34.3 G/DL
MCV RBC AUTO: 95.8 FL
NEUTROPHILS # BLD AUTO: 4 K/UL
NEUTROPHILS NFR BLD AUTO: 71.2 %
PHOSPHATE SERPL-MCNC: 3.3 MG/DL
PLATELET # BLD AUTO: 309 K/UL
POTASSIUM SERPL-SCNC: 4.5 MMOL/L
PROT SERPL-MCNC: 6.7 G/DL
RBC # BLD: 3.13 M/UL
RBC # FLD: 13.7 %
SODIUM SERPL-SCNC: 137 MMOL/L
SURGICAL PATHOLOGY STUDY: SIGNIFICANT CHANGE UP
WBC # FLD AUTO: 5.6 K/UL

## 2025-04-14 PROCEDURE — 96523 IRRIG DRUG DELIVERY DEVICE: CPT

## 2025-04-14 PROCEDURE — 99215 OFFICE O/P EST HI 40 MIN: CPT

## 2025-04-16 ENCOUNTER — NON-APPOINTMENT (OUTPATIENT)
Age: 70
End: 2025-04-16

## 2025-04-18 ENCOUNTER — APPOINTMENT (OUTPATIENT)
Dept: SURGICAL ONCOLOGY | Facility: CLINIC | Age: 70
End: 2025-04-18

## 2025-04-18 ENCOUNTER — NON-APPOINTMENT (OUTPATIENT)
Age: 70
End: 2025-04-18

## 2025-04-18 VITALS
RESPIRATION RATE: 17 BRPM | OXYGEN SATURATION: 97 % | HEIGHT: 72 IN | SYSTOLIC BLOOD PRESSURE: 139 MMHG | WEIGHT: 152 LBS | HEART RATE: 57 BPM | DIASTOLIC BLOOD PRESSURE: 84 MMHG | BODY MASS INDEX: 20.59 KG/M2

## 2025-04-18 DIAGNOSIS — C22.1 INTRAHEPATIC BILE DUCT CARCINOMA: ICD-10-CM

## 2025-04-18 PROCEDURE — 99024 POSTOP FOLLOW-UP VISIT: CPT

## 2025-04-21 ENCOUNTER — NON-APPOINTMENT (OUTPATIENT)
Age: 70
End: 2025-04-21

## 2025-04-21 ENCOUNTER — APPOINTMENT (OUTPATIENT)
Dept: INFUSION THERAPY | Facility: CLINIC | Age: 70
End: 2025-04-21

## 2025-04-23 ENCOUNTER — NON-APPOINTMENT (OUTPATIENT)
Age: 70
End: 2025-04-23

## 2025-04-24 ENCOUNTER — APPOINTMENT (OUTPATIENT)
Dept: HEMATOLOGY ONCOLOGY | Facility: CLINIC | Age: 70
End: 2025-04-24

## 2025-04-24 RX ORDER — FOSAPREPITANT 150 MG/5ML
150 INJECTION, POWDER, LYOPHILIZED, FOR SOLUTION INTRAVENOUS ONCE
Refills: 0 | Status: COMPLETED | OUTPATIENT
Start: 2025-04-28 | End: 2025-04-28

## 2025-04-24 RX ORDER — LIDOCAINE HYDROCHLORIDE 20 MG/ML
1 JELLY TOPICAL ONCE
Refills: 0 | Status: COMPLETED | OUTPATIENT
Start: 2025-04-28 | End: 2025-04-28

## 2025-04-24 RX ORDER — OXALIPLATIN 5 MG/ML
110 INJECTION, SOLUTION, CONCENTRATE INTRAVENOUS ONCE
Refills: 0 | Status: COMPLETED | OUTPATIENT
Start: 2025-04-28 | End: 2025-04-28

## 2025-04-24 RX ORDER — GEMCITABINE HYDROCHLORIDE 38 MG/ML
1500 INJECTION, SOLUTION INTRAVENOUS ONCE
Refills: 0 | Status: COMPLETED | OUTPATIENT
Start: 2025-04-28 | End: 2025-04-28

## 2025-04-24 RX ORDER — DEXAMETHASONE 0.5 MG/1
10 TABLET ORAL ONCE
Refills: 0 | Status: COMPLETED | OUTPATIENT
Start: 2025-04-28 | End: 2025-04-28

## 2025-04-24 RX ORDER — PALONOSETRON HYDROCHLORIDE 0.05 MG/ML
0.25 INJECTION, SOLUTION INTRAVENOUS ONCE
Refills: 0 | Status: COMPLETED | OUTPATIENT
Start: 2025-04-28 | End: 2025-04-28

## 2025-04-25 RX ORDER — FLOXURIDINE 500 MG/1
100 INJECTION, POWDER, LYOPHILIZED, FOR SOLUTION INTRA-ARTERIAL ONCE
Refills: 0 | Status: COMPLETED | OUTPATIENT
Start: 2025-04-28 | End: 2025-04-28

## 2025-04-27 LAB
ALBUMIN SERPL ELPH-MCNC: 3.5 G/DL
ALP BLD-CCNC: 121 U/L
ALT SERPL-CCNC: 38 U/L
ANION GAP SERPL CALC-SCNC: 0 MMOL/L
AST SERPL-CCNC: 27 U/L
BILIRUB SERPL-MCNC: 1 MG/DL
BUN SERPL-MCNC: 18 MG/DL
CALCIUM SERPL-MCNC: 9.2 MG/DL
CANCER AG19-9 SERPL-ACNC: 14 U/ML
CHLORIDE SERPL-SCNC: 105 MMOL/L
CO2 SERPL-SCNC: 30 MMOL/L
CREAT SERPL-MCNC: 1 MG/DL
EGFRCR SERPLBLD CKD-EPI 2021: 82 ML/MIN/1.73M2
GLUCOSE SERPL-MCNC: 89 MG/DL
HCT VFR BLD CALC: 32.5 %
HGB BLD-MCNC: 11 G/DL
LYMPHOCYTES # BLD AUTO: 1.5 K/UL
LYMPHOCYTES NFR BLD AUTO: 12.7 %
MAGNESIUM SERPL-MCNC: 2.2 MG/DL
MAN DIFF?: NO
MCHC RBC-ENTMCNC: 33 PG
MCHC RBC-ENTMCNC: 33.8 G/DL
MCV RBC AUTO: 97.6 FL
NEUTROPHILS # BLD AUTO: 9.8 K/UL
NEUTROPHILS NFR BLD AUTO: 79.7 %
PHOSPHATE SERPL-MCNC: 4.5 MG/DL
PLATELET # BLD AUTO: 223 K/UL
POTASSIUM SERPL-SCNC: 4.5 MMOL/L
PROT SERPL-MCNC: 7.3 G/DL
RBC # BLD: 3.33 M/UL
RBC # FLD: 13.9 %
SODIUM SERPL-SCNC: 135 MMOL/L
WBC # FLD AUTO: 12.2 K/UL

## 2025-04-28 ENCOUNTER — OUTPATIENT (OUTPATIENT)
Dept: OUTPATIENT SERVICES | Facility: HOSPITAL | Age: 70
LOS: 1 days | End: 2025-04-28
Payer: MEDICARE

## 2025-04-28 ENCOUNTER — APPOINTMENT (OUTPATIENT)
Dept: INFUSION THERAPY | Facility: CLINIC | Age: 70
End: 2025-04-28

## 2025-04-28 ENCOUNTER — APPOINTMENT (OUTPATIENT)
Dept: HEMATOLOGY ONCOLOGY | Facility: CLINIC | Age: 70
End: 2025-04-28
Payer: MEDICARE

## 2025-04-28 VITALS
SYSTOLIC BLOOD PRESSURE: 128 MMHG | HEIGHT: 72 IN | WEIGHT: 154.98 LBS | HEART RATE: 55 BPM | TEMPERATURE: 97 F | RESPIRATION RATE: 16 BRPM | OXYGEN SATURATION: 100 % | DIASTOLIC BLOOD PRESSURE: 78 MMHG

## 2025-04-28 VITALS
BODY MASS INDEX: 20.99 KG/M2 | DIASTOLIC BLOOD PRESSURE: 84 MMHG | HEIGHT: 72 IN | SYSTOLIC BLOOD PRESSURE: 137 MMHG | TEMPERATURE: 97.4 F | RESPIRATION RATE: 18 BRPM | WEIGHT: 155 LBS | OXYGEN SATURATION: 99 % | HEART RATE: 58 BPM

## 2025-04-28 DIAGNOSIS — Z98.890 OTHER SPECIFIED POSTPROCEDURAL STATES: Chronic | ICD-10-CM

## 2025-04-28 DIAGNOSIS — Z92.21 PERSONAL HISTORY OF ANTINEOPLASTIC CHEMOTHERAPY: Chronic | ICD-10-CM

## 2025-04-28 DIAGNOSIS — C22.1 INTRAHEPATIC BILE DUCT CARCINOMA: ICD-10-CM

## 2025-04-28 DIAGNOSIS — Z92.3 PERSONAL HISTORY OF IRRADIATION: Chronic | ICD-10-CM

## 2025-04-28 PROCEDURE — 96413 CHEMO IV INFUSION 1 HR: CPT

## 2025-04-28 PROCEDURE — 96416 CHEMO PROLONG INFUSE W/PUMP: CPT

## 2025-04-28 PROCEDURE — 96417 CHEMO IV INFUS EACH ADDL SEQ: CPT

## 2025-04-28 PROCEDURE — 99215 OFFICE O/P EST HI 40 MIN: CPT

## 2025-04-28 PROCEDURE — 96375 TX/PRO/DX INJ NEW DRUG ADDON: CPT

## 2025-04-28 PROCEDURE — 96367 TX/PROPH/DG ADDL SEQ IV INF: CPT

## 2025-04-28 RX ADMIN — PALONOSETRON HYDROCHLORIDE 0.25 MILLIGRAM(S): 0.05 INJECTION, SOLUTION INTRAVENOUS at 11:03

## 2025-04-28 RX ADMIN — DEXAMETHASONE 204 MILLIGRAM(S): 0.5 TABLET ORAL at 11:03

## 2025-04-28 RX ADMIN — Medication 10 MILLILITER(S): at 11:31

## 2025-04-28 RX ADMIN — FOSAPREPITANT 150 MILLIGRAM(S): 150 INJECTION, POWDER, LYOPHILIZED, FOR SOLUTION INTRAVENOUS at 12:50

## 2025-04-28 RX ADMIN — GEMCITABINE HYDROCHLORIDE 1500 MILLIGRAM(S): 38 INJECTION, SOLUTION INTRAVENOUS at 11:24

## 2025-04-28 RX ADMIN — OXALIPLATIN 110 MILLIGRAM(S): 5 INJECTION, SOLUTION, CONCENTRATE INTRAVENOUS at 11:25

## 2025-04-28 RX ADMIN — DEXAMETHASONE 10 MILLIGRAM(S): 0.5 TABLET ORAL at 11:20

## 2025-04-28 RX ADMIN — FOSAPREPITANT 500 MILLIGRAM(S): 150 INJECTION, POWDER, LYOPHILIZED, FOR SOLUTION INTRAVENOUS at 11:06

## 2025-04-28 RX ADMIN — FLOXURIDINE 100 MILLIGRAM(S): 500 INJECTION, POWDER, LYOPHILIZED, FOR SOLUTION INTRA-ARTERIAL at 14:43

## 2025-05-08 ENCOUNTER — APPOINTMENT (OUTPATIENT)
Dept: HEMATOLOGY ONCOLOGY | Facility: CLINIC | Age: 70
End: 2025-05-08

## 2025-05-12 ENCOUNTER — APPOINTMENT (OUTPATIENT)
Dept: HEMATOLOGY ONCOLOGY | Facility: CLINIC | Age: 70
End: 2025-05-12
Payer: MEDICARE

## 2025-05-12 ENCOUNTER — OUTPATIENT (OUTPATIENT)
Dept: OUTPATIENT SERVICES | Facility: HOSPITAL | Age: 70
LOS: 1 days | End: 2025-05-12
Payer: MEDICARE

## 2025-05-12 ENCOUNTER — APPOINTMENT (OUTPATIENT)
Dept: INFUSION THERAPY | Facility: CLINIC | Age: 70
End: 2025-05-12

## 2025-05-12 VITALS
TEMPERATURE: 98.7 F | BODY MASS INDEX: 20.86 KG/M2 | HEIGHT: 72 IN | DIASTOLIC BLOOD PRESSURE: 82 MMHG | RESPIRATION RATE: 18 BRPM | OXYGEN SATURATION: 97 % | HEART RATE: 73 BPM | WEIGHT: 154 LBS | SYSTOLIC BLOOD PRESSURE: 129 MMHG

## 2025-05-12 VITALS
HEART RATE: 78 BPM | TEMPERATURE: 98 F | RESPIRATION RATE: 18 BRPM | DIASTOLIC BLOOD PRESSURE: 70 MMHG | HEIGHT: 72 IN | WEIGHT: 154.98 LBS | OXYGEN SATURATION: 96 % | SYSTOLIC BLOOD PRESSURE: 110 MMHG

## 2025-05-12 DIAGNOSIS — Z98.890 OTHER SPECIFIED POSTPROCEDURAL STATES: Chronic | ICD-10-CM

## 2025-05-12 DIAGNOSIS — J06.9 ACUTE UPPER RESPIRATORY INFECTION, UNSPECIFIED: ICD-10-CM

## 2025-05-12 DIAGNOSIS — Z92.21 PERSONAL HISTORY OF ANTINEOPLASTIC CHEMOTHERAPY: Chronic | ICD-10-CM

## 2025-05-12 DIAGNOSIS — Z92.3 PERSONAL HISTORY OF IRRADIATION: Chronic | ICD-10-CM

## 2025-05-12 DIAGNOSIS — C22.1 INTRAHEPATIC BILE DUCT CARCINOMA: ICD-10-CM

## 2025-05-12 PROCEDURE — 99215 OFFICE O/P EST HI 40 MIN: CPT

## 2025-05-12 PROCEDURE — 96523 IRRIG DRUG DELIVERY DEVICE: CPT

## 2025-05-12 NOTE — PHARMACY COMMUNICATION NOTE - COMMENTS
As per Dr. Hancock, holding gemcitabine/oxaliplatin on 5/12/25 receiving ONLY FELIX saline refill. The patient is experiencing fever, chills, and cough.

## 2025-05-13 LAB
ALBUMIN SERPL ELPH-MCNC: 3.6 G/DL
ALP BLD-CCNC: 92 U/L
ALT SERPL-CCNC: 75 U/L
ANION GAP SERPL CALC-SCNC: 2 MMOL/L
AST SERPL-CCNC: 36 U/L
BILIRUB SERPL-MCNC: 0.9 MG/DL
BUN SERPL-MCNC: 20 MG/DL
CALCIUM SERPL-MCNC: 9.8 MG/DL
CHLORIDE SERPL-SCNC: 105 MMOL/L
CO2 SERPL-SCNC: 27 MMOL/L
CREAT SERPL-MCNC: 1.2 MG/DL
EGFRCR SERPLBLD CKD-EPI 2021: 66 ML/MIN/1.73M2
GLUCOSE SERPL-MCNC: 102 MG/DL
HCT VFR BLD CALC: 33.9 %
HGB BLD-MCNC: 11.7 G/DL
LYMPHOCYTES # BLD AUTO: 1.2 K/UL
LYMPHOCYTES NFR BLD AUTO: 20 %
MAGNESIUM SERPL-MCNC: 2.2 MG/DL
MAN DIFF?: NO
MCHC RBC-ENTMCNC: 33.3 PG
MCHC RBC-ENTMCNC: 34.5 G/DL
MCV RBC AUTO: 96.6 FL
NEUTROPHILS # BLD AUTO: 4.2 K/UL
NEUTROPHILS NFR BLD AUTO: 69.6 %
PHOSPHATE SERPL-MCNC: 4.4 MG/DL
PLATELET # BLD AUTO: 120 K/UL
POTASSIUM SERPL-SCNC: 5.2 MMOL/L
PROT SERPL-MCNC: 7.2 G/DL
RBC # BLD: 3.51 M/UL
RBC # FLD: 13.3 %
SODIUM SERPL-SCNC: 134 MMOL/L
WBC # FLD AUTO: 6 K/UL

## 2025-05-13 RX ORDER — BENZONATATE 100 MG/1
100 CAPSULE ORAL 3 TIMES DAILY
Qty: 30 | Refills: 0 | Status: ACTIVE | COMMUNITY
Start: 2025-05-12 | End: 1900-01-01

## 2025-05-15 RX ORDER — PREDNISONE 10 MG/1
10 TABLET ORAL DAILY
Qty: 7 | Refills: 0 | Status: ACTIVE | COMMUNITY
Start: 2025-05-15 | End: 1900-01-01

## 2025-05-19 ENCOUNTER — APPOINTMENT (OUTPATIENT)
Dept: INFUSION THERAPY | Facility: CLINIC | Age: 70
End: 2025-05-19

## 2025-05-19 LAB
BACTERIA BLD CULT: NORMAL
BACTERIA BLD CULT: NORMAL

## 2025-05-21 ENCOUNTER — RESULT REVIEW (OUTPATIENT)
Age: 70
End: 2025-05-21

## 2025-05-22 ENCOUNTER — OUTPATIENT (OUTPATIENT)
Dept: OUTPATIENT SERVICES | Facility: HOSPITAL | Age: 70
LOS: 1 days | End: 2025-05-22
Payer: MEDICARE

## 2025-05-22 ENCOUNTER — APPOINTMENT (OUTPATIENT)
Dept: CT IMAGING | Facility: HOSPITAL | Age: 70
End: 2025-05-22

## 2025-05-22 ENCOUNTER — NON-APPOINTMENT (OUTPATIENT)
Age: 70
End: 2025-05-22

## 2025-05-22 DIAGNOSIS — Z98.890 OTHER SPECIFIED POSTPROCEDURAL STATES: Chronic | ICD-10-CM

## 2025-05-22 DIAGNOSIS — Z92.3 PERSONAL HISTORY OF IRRADIATION: Chronic | ICD-10-CM

## 2025-05-22 DIAGNOSIS — Z92.21 PERSONAL HISTORY OF ANTINEOPLASTIC CHEMOTHERAPY: Chronic | ICD-10-CM

## 2025-05-22 PROCEDURE — 71260 CT THORAX DX C+: CPT | Mod: 26

## 2025-05-22 PROCEDURE — 74177 CT ABD & PELVIS W/CONTRAST: CPT | Mod: 26

## 2025-05-23 ENCOUNTER — APPOINTMENT (OUTPATIENT)
Dept: HEMATOLOGY ONCOLOGY | Facility: CLINIC | Age: 70
End: 2025-05-23

## 2025-05-23 RX ORDER — ATTAPULGITE 600 MG
20-400 TABLET,CHEWABLE ORAL EVERY 4 HOURS
Qty: 1 | Refills: 0 | Status: ACTIVE | COMMUNITY
Start: 2025-05-23 | End: 1900-01-01

## 2025-05-23 RX ORDER — CODEINE PHOSPHATE AND GUAIFENESIN 10; 200 MG/5ML; MG/5ML
200-10 SYRUP ORAL
Qty: 1 | Refills: 0 | Status: ACTIVE | COMMUNITY
Start: 2025-05-23 | End: 1900-01-01

## 2025-05-24 ENCOUNTER — INPATIENT (INPATIENT)
Facility: HOSPITAL | Age: 70
LOS: 2 days | Discharge: HOME | End: 2025-05-27
Attending: INTERNAL MEDICINE | Admitting: INTERNAL MEDICINE
Payer: MEDICARE

## 2025-05-24 VITALS
HEART RATE: 89 BPM | RESPIRATION RATE: 16 BRPM | HEIGHT: 72 IN | TEMPERATURE: 98 F | DIASTOLIC BLOOD PRESSURE: 83 MMHG | WEIGHT: 130.07 LBS | OXYGEN SATURATION: 96 % | SYSTOLIC BLOOD PRESSURE: 131 MMHG

## 2025-05-24 DIAGNOSIS — E87.1 HYPO-OSMOLALITY AND HYPONATREMIA: ICD-10-CM

## 2025-05-24 DIAGNOSIS — Z29.9 ENCOUNTER FOR PROPHYLACTIC MEASURES, UNSPECIFIED: ICD-10-CM

## 2025-05-24 DIAGNOSIS — B58.00: ICD-10-CM

## 2025-05-24 DIAGNOSIS — D63.8 ANEMIA IN OTHER CHRONIC DISEASES CLASSIFIED ELSEWHERE: ICD-10-CM

## 2025-05-24 DIAGNOSIS — E78.5 HYPERLIPIDEMIA, UNSPECIFIED: ICD-10-CM

## 2025-05-24 DIAGNOSIS — Z98.890 OTHER SPECIFIED POSTPROCEDURAL STATES: Chronic | ICD-10-CM

## 2025-05-24 DIAGNOSIS — J18.9 PNEUMONIA, UNSPECIFIED ORGANISM: ICD-10-CM

## 2025-05-24 DIAGNOSIS — J45.909 UNSPECIFIED ASTHMA, UNCOMPLICATED: ICD-10-CM

## 2025-05-24 DIAGNOSIS — C22.1 INTRAHEPATIC BILE DUCT CARCINOMA: ICD-10-CM

## 2025-05-24 DIAGNOSIS — R00.0 TACHYCARDIA, UNSPECIFIED: ICD-10-CM

## 2025-05-24 DIAGNOSIS — Z86.19 PERSONAL HISTORY OF OTHER INFECTIOUS AND PARASITIC DISEASES: ICD-10-CM

## 2025-05-24 DIAGNOSIS — R74.01 ELEVATION OF LEVELS OF LIVER TRANSAMINASE LEVELS: ICD-10-CM

## 2025-05-24 DIAGNOSIS — Z92.3 PERSONAL HISTORY OF IRRADIATION: Chronic | ICD-10-CM

## 2025-05-24 DIAGNOSIS — Z92.21 PERSONAL HISTORY OF ANTINEOPLASTIC CHEMOTHERAPY: Chronic | ICD-10-CM

## 2025-05-24 LAB
ADD ON TEST-SPECIMEN IN LAB: SIGNIFICANT CHANGE UP
ALBUMIN SERPL ELPH-MCNC: 2.8 G/DL — LOW (ref 3.3–5)
ALP SERPL-CCNC: 546 U/L — HIGH (ref 40–120)
ALT FLD-CCNC: 113 U/L — HIGH (ref 10–45)
ANION GAP SERPL CALC-SCNC: 10 MMOL/L — SIGNIFICANT CHANGE UP (ref 5–17)
ANION GAP SERPL CALC-SCNC: 13 MMOL/L — SIGNIFICANT CHANGE UP (ref 5–17)
ANION GAP SERPL CALC-SCNC: 13 MMOL/L — SIGNIFICANT CHANGE UP (ref 5–17)
ANION GAP SERPL CALC-SCNC: 15 MMOL/L — SIGNIFICANT CHANGE UP (ref 5–17)
APAP SERPL-MCNC: <5 UG/ML — LOW (ref 10–30)
APPEARANCE UR: CLEAR — SIGNIFICANT CHANGE UP
AST SERPL-CCNC: SIGNIFICANT CHANGE UP (ref 10–40)
BASOPHILS # BLD AUTO: 0 K/UL — SIGNIFICANT CHANGE UP (ref 0–0.2)
BASOPHILS NFR BLD AUTO: 0 % — SIGNIFICANT CHANGE UP (ref 0–2)
BILIRUB SERPL-MCNC: 0.6 MG/DL — SIGNIFICANT CHANGE UP (ref 0.2–1.2)
BILIRUB UR-MCNC: NEGATIVE — SIGNIFICANT CHANGE UP
BLD GP AB SCN SERPL QL: NEGATIVE — SIGNIFICANT CHANGE UP
BUN SERPL-MCNC: 13 MG/DL — SIGNIFICANT CHANGE UP (ref 7–23)
BUN SERPL-MCNC: 14 MG/DL — SIGNIFICANT CHANGE UP (ref 7–23)
BUN SERPL-MCNC: 14 MG/DL — SIGNIFICANT CHANGE UP (ref 7–23)
BUN SERPL-MCNC: 15 MG/DL — SIGNIFICANT CHANGE UP (ref 7–23)
CALCIUM SERPL-MCNC: 8.1 MG/DL — LOW (ref 8.4–10.5)
CALCIUM SERPL-MCNC: 8.4 MG/DL — SIGNIFICANT CHANGE UP (ref 8.4–10.5)
CALCIUM SERPL-MCNC: 8.4 MG/DL — SIGNIFICANT CHANGE UP (ref 8.4–10.5)
CALCIUM SERPL-MCNC: 8.9 MG/DL — SIGNIFICANT CHANGE UP (ref 8.4–10.5)
CHLORIDE SERPL-SCNC: 91 MMOL/L — LOW (ref 96–108)
CHLORIDE SERPL-SCNC: 93 MMOL/L — LOW (ref 96–108)
CHLORIDE SERPL-SCNC: 93 MMOL/L — LOW (ref 96–108)
CHLORIDE SERPL-SCNC: 94 MMOL/L — LOW (ref 96–108)
CO2 SERPL-SCNC: 18 MMOL/L — LOW (ref 22–31)
CO2 SERPL-SCNC: 19 MMOL/L — LOW (ref 22–31)
CO2 SERPL-SCNC: 21 MMOL/L — LOW (ref 22–31)
CO2 SERPL-SCNC: 23 MMOL/L — SIGNIFICANT CHANGE UP (ref 22–31)
COLOR SPEC: SIGNIFICANT CHANGE UP
CREAT SERPL-MCNC: 0.72 MG/DL — SIGNIFICANT CHANGE UP (ref 0.5–1.3)
CREAT SERPL-MCNC: 0.79 MG/DL — SIGNIFICANT CHANGE UP (ref 0.5–1.3)
CREAT SERPL-MCNC: 0.81 MG/DL — SIGNIFICANT CHANGE UP (ref 0.5–1.3)
CREAT SERPL-MCNC: 0.91 MG/DL — SIGNIFICANT CHANGE UP (ref 0.5–1.3)
DIFF PNL FLD: NEGATIVE — SIGNIFICANT CHANGE UP
EGFR: 100 ML/MIN/1.73M2 — SIGNIFICANT CHANGE UP
EGFR: 100 ML/MIN/1.73M2 — SIGNIFICANT CHANGE UP
EGFR: 92 ML/MIN/1.73M2 — SIGNIFICANT CHANGE UP
EGFR: 92 ML/MIN/1.73M2 — SIGNIFICANT CHANGE UP
EGFR: 97 ML/MIN/1.73M2 — SIGNIFICANT CHANGE UP
EOSINOPHIL # BLD AUTO: 0 K/UL — SIGNIFICANT CHANGE UP (ref 0–0.5)
EOSINOPHIL NFR BLD AUTO: 0 % — SIGNIFICANT CHANGE UP (ref 0–6)
FLUAV AG NPH QL: SIGNIFICANT CHANGE UP
FLUBV AG NPH QL: SIGNIFICANT CHANGE UP
GLUCOSE SERPL-MCNC: 111 MG/DL — HIGH (ref 70–99)
GLUCOSE SERPL-MCNC: 112 MG/DL — HIGH (ref 70–99)
GLUCOSE SERPL-MCNC: 116 MG/DL — HIGH (ref 70–99)
GLUCOSE SERPL-MCNC: 118 MG/DL — HIGH (ref 70–99)
GLUCOSE UR QL: NEGATIVE MG/DL — SIGNIFICANT CHANGE UP
HCT VFR BLD CALC: 33.8 % — LOW (ref 39–50)
HGB BLD-MCNC: 11.6 G/DL — LOW (ref 13–17)
KETONES UR QL: NEGATIVE MG/DL — SIGNIFICANT CHANGE UP
LACTATE SERPL-SCNC: 1.5 MMOL/L — SIGNIFICANT CHANGE UP (ref 0.5–2)
LACTATE SERPL-SCNC: 2 MMOL/L — SIGNIFICANT CHANGE UP (ref 0.5–2)
LACTATE SERPL-SCNC: 2.2 MMOL/L — HIGH (ref 0.5–2)
LACTATE SERPL-SCNC: 2.3 MMOL/L — HIGH (ref 0.5–2)
LACTATE SERPL-SCNC: 2.3 MMOL/L — HIGH (ref 0.5–2)
LEGIONELLA AG UR QL: NEGATIVE — SIGNIFICANT CHANGE UP
LEUKOCYTE ESTERASE UR-ACNC: NEGATIVE — SIGNIFICANT CHANGE UP
LYMPHOCYTES # BLD AUTO: 1.44 K/UL — SIGNIFICANT CHANGE UP (ref 1–3.3)
LYMPHOCYTES # BLD AUTO: 4.4 % — LOW (ref 13–44)
MCHC RBC-ENTMCNC: 34.1 PG — HIGH (ref 27–34)
MCHC RBC-ENTMCNC: 34.3 G/DL — SIGNIFICANT CHANGE UP (ref 32–36)
MCV RBC AUTO: 99.4 FL — SIGNIFICANT CHANGE UP (ref 80–100)
MONOCYTES # BLD AUTO: 0.56 K/UL — SIGNIFICANT CHANGE UP (ref 0–0.9)
MONOCYTES NFR BLD AUTO: 1.7 % — LOW (ref 2–14)
NEUTROPHILS # BLD AUTO: 30.66 K/UL — HIGH (ref 1.8–7.4)
NEUTROPHILS NFR BLD AUTO: 93.9 % — HIGH (ref 43–77)
NITRITE UR-MCNC: NEGATIVE — SIGNIFICANT CHANGE UP
OSMOLALITY SERPL: 265 MOSM/KG — LOW (ref 280–301)
OSMOLALITY UR: 499 MOSM/KG — SIGNIFICANT CHANGE UP (ref 300–900)
PH UR: 7.5 — SIGNIFICANT CHANGE UP (ref 5–8)
PLATELET # BLD AUTO: 299 K/UL — SIGNIFICANT CHANGE UP (ref 150–400)
POTASSIUM SERPL-MCNC: 5 MMOL/L — SIGNIFICANT CHANGE UP (ref 3.5–5.3)
POTASSIUM SERPL-MCNC: 5.1 MMOL/L — SIGNIFICANT CHANGE UP (ref 3.5–5.3)
POTASSIUM SERPL-MCNC: 5.2 MMOL/L — SIGNIFICANT CHANGE UP (ref 3.5–5.3)
POTASSIUM SERPL-MCNC: 5.4 MMOL/L — HIGH (ref 3.5–5.3)
POTASSIUM SERPL-SCNC: 5 MMOL/L — SIGNIFICANT CHANGE UP (ref 3.5–5.3)
POTASSIUM SERPL-SCNC: 5.1 MMOL/L — SIGNIFICANT CHANGE UP (ref 3.5–5.3)
POTASSIUM SERPL-SCNC: 5.2 MMOL/L — SIGNIFICANT CHANGE UP (ref 3.5–5.3)
POTASSIUM SERPL-SCNC: 5.4 MMOL/L — HIGH (ref 3.5–5.3)
PROT SERPL-MCNC: 6.4 G/DL — SIGNIFICANT CHANGE UP (ref 6–8.3)
PROT UR-MCNC: SIGNIFICANT CHANGE UP MG/DL
RBC # BLD: 3.4 M/UL — LOW (ref 4.2–5.8)
RBC # FLD: 13.5 % — SIGNIFICANT CHANGE UP (ref 10.3–14.5)
RH IG SCN BLD-IMP: NEGATIVE — SIGNIFICANT CHANGE UP
RSV RNA NPH QL NAA+NON-PROBE: SIGNIFICANT CHANGE UP
S PNEUM AG UR QL: NEGATIVE — SIGNIFICANT CHANGE UP
SARS-COV-2 RNA SPEC QL NAA+PROBE: SIGNIFICANT CHANGE UP
SODIUM SERPL-SCNC: 125 MMOL/L — LOW (ref 135–145)
SODIUM SERPL-SCNC: 125 MMOL/L — LOW (ref 135–145)
SODIUM SERPL-SCNC: 126 MMOL/L — LOW (ref 135–145)
SODIUM SERPL-SCNC: 127 MMOL/L — LOW (ref 135–145)
SODIUM UR-SCNC: 156 MMOL/L — SIGNIFICANT CHANGE UP
SOURCE RESPIRATORY: SIGNIFICANT CHANGE UP
SP GR SPEC: 1.01 — SIGNIFICANT CHANGE UP (ref 1–1.03)
UROBILINOGEN FLD QL: 1 MG/DL — SIGNIFICANT CHANGE UP (ref 0.2–1)
WBC # BLD: 32.65 K/UL — HIGH (ref 3.8–10.5)
WBC # FLD AUTO: 32.65 K/UL — HIGH (ref 3.8–10.5)

## 2025-05-24 PROCEDURE — 93010 ELECTROCARDIOGRAM REPORT: CPT

## 2025-05-24 PROCEDURE — 99223 1ST HOSP IP/OBS HIGH 75: CPT | Mod: GC

## 2025-05-24 PROCEDURE — 71045 X-RAY EXAM CHEST 1 VIEW: CPT | Mod: 26

## 2025-05-24 PROCEDURE — 99285 EMERGENCY DEPT VISIT HI MDM: CPT | Mod: FS

## 2025-05-24 RX ORDER — CEFTRIAXONE 500 MG/1
2000 INJECTION, POWDER, FOR SOLUTION INTRAMUSCULAR; INTRAVENOUS EVERY 24 HOURS
Refills: 0 | Status: DISCONTINUED | OUTPATIENT
Start: 2025-05-25 | End: 2025-05-27

## 2025-05-24 RX ORDER — BENZONATATE 100 MG
100 CAPSULE ORAL ONCE
Refills: 0 | Status: COMPLETED | OUTPATIENT
Start: 2025-05-24 | End: 2025-05-24

## 2025-05-24 RX ORDER — POSACONAZOLE 100 MG/1
300 TABLET, DELAYED RELEASE ORAL ONCE
Refills: 0 | Status: DISCONTINUED | OUTPATIENT
Start: 2025-05-24 | End: 2025-05-24

## 2025-05-24 RX ORDER — AZITHROMYCIN 250 MG
500 CAPSULE ORAL ONCE
Refills: 0 | Status: COMPLETED | OUTPATIENT
Start: 2025-05-24 | End: 2025-05-24

## 2025-05-24 RX ORDER — MONTELUKAST SODIUM 10 MG/1
10 TABLET ORAL DAILY
Refills: 0 | Status: DISCONTINUED | OUTPATIENT
Start: 2025-05-25 | End: 2025-05-27

## 2025-05-24 RX ORDER — DEXTROMETHORPHAN HBR, GUAIFENESIN 200 MG/10ML
100 LIQUID ORAL EVERY 6 HOURS
Refills: 0 | Status: DISCONTINUED | OUTPATIENT
Start: 2025-05-24 | End: 2025-05-27

## 2025-05-24 RX ORDER — ENOXAPARIN SODIUM 100 MG/ML
40 INJECTION SUBCUTANEOUS EVERY 24 HOURS
Refills: 0 | Status: DISCONTINUED | OUTPATIENT
Start: 2025-05-24 | End: 2025-05-27

## 2025-05-24 RX ORDER — ALBUTEROL SULFATE 2.5 MG/3ML
2 VIAL, NEBULIZER (ML) INHALATION EVERY 6 HOURS
Refills: 0 | Status: DISCONTINUED | OUTPATIENT
Start: 2025-05-24 | End: 2025-05-27

## 2025-05-24 RX ORDER — CEFTRIAXONE 500 MG/1
2000 INJECTION, POWDER, FOR SOLUTION INTRAMUSCULAR; INTRAVENOUS ONCE
Refills: 0 | Status: COMPLETED | OUTPATIENT
Start: 2025-05-24 | End: 2025-05-24

## 2025-05-24 RX ORDER — POSACONAZOLE 100 MG/1
300 TABLET, DELAYED RELEASE ORAL DAILY
Refills: 0 | Status: DISCONTINUED | OUTPATIENT
Start: 2025-05-25 | End: 2025-05-27

## 2025-05-24 RX ORDER — MELATONIN 5 MG
3 TABLET ORAL AT BEDTIME
Refills: 0 | Status: DISCONTINUED | OUTPATIENT
Start: 2025-05-24 | End: 2025-05-27

## 2025-05-24 RX ORDER — ACETAMINOPHEN 500 MG/5ML
650 LIQUID (ML) ORAL EVERY 6 HOURS
Refills: 0 | Status: DISCONTINUED | OUTPATIENT
Start: 2025-05-24 | End: 2025-05-24

## 2025-05-24 RX ORDER — DILTIAZEM HYDROCHLORIDE 240 MG/1
120 TABLET, EXTENDED RELEASE ORAL DAILY
Refills: 0 | Status: DISCONTINUED | OUTPATIENT
Start: 2025-05-25 | End: 2025-05-27

## 2025-05-24 RX ORDER — ALBUTEROL SULFATE 2.5 MG/3ML
2.5 VIAL, NEBULIZER (ML) INHALATION ONCE
Refills: 0 | Status: COMPLETED | OUTPATIENT
Start: 2025-05-24 | End: 2025-05-24

## 2025-05-24 RX ADMIN — Medication 1000 MILLILITER(S): at 12:15

## 2025-05-24 RX ADMIN — Medication 1000 MILLILITER(S): at 11:14

## 2025-05-24 RX ADMIN — Medication 1000 MILLILITER(S): at 13:18

## 2025-05-24 RX ADMIN — Medication 100 MILLIGRAM(S): at 11:14

## 2025-05-24 RX ADMIN — Medication 2.5 MILLIGRAM(S): at 11:14

## 2025-05-24 RX ADMIN — Medication 255 MILLIGRAM(S): at 11:27

## 2025-05-24 RX ADMIN — CEFTRIAXONE 100 MILLIGRAM(S): 500 INJECTION, POWDER, FOR SOLUTION INTRAMUSCULAR; INTRAVENOUS at 11:26

## 2025-05-24 NOTE — CONSULT NOTE ADULT - SUBJECTIVE AND OBJECTIVE BOX
******INCOMPLETE******    Patient is a 69y old  Male who presents with a chief complaint of     HPI: HPI:      HEMATOLOGY/ONCOLOGY HISTORY:    PSHx:  FHx:  SocHx:    REVIEW OF SYSTEMS:  All other review of systems is negative unless indicated above.    OBJECTIVE:  T(C): 36.6 (05-24-25 @ 13:19), Max: 36.8 (05-24-25 @ 10:20)  HR: 94 (05-24-25 @ 13:19) (89 - 94)  BP: 150/75 (05-24-25 @ 13:19) (131/83 - 150/75)  RR: 16 (05-24-25 @ 13:19) (16 - 16)  SpO2: 97% (05-24-25 @ 13:19) (96% - 97%)  Daily Height in cm: 182.88 (24 May 2025 10:20)    Daily     Physical Exam:  General: in no acute distress  Eyes: EOMI intact bilaterally. Anicteric sclerae, moist conjunctivae  HENT: Moist mucous membranes  Neck: Trachea midline, supple  Lungs: CTA B/L. No wheezes, rales, or rhonchi  Cardiovascular: RRR. No murmurs, rubs, or gallops  Abdomen: Soft, non-tender non-distended; No rebound or guarding  Extremities: WWP, No clubbing, cyanosis or edema  MSK: No midline bony tenderness. No CVA tenderness bilaterally  Neurological: Alert and oriented x3  Skin: Warm and dry. No obvious rash     Medications:  MEDICATIONS  (STANDING):    MEDICATIONS  (PRN):      Labs:                        11.6   32.65 )-----------( 299      ( 24 May 2025 11:50 )             33.8     05-24    126[L]  |  93[L]  |  14  ----------------------------<  112[H]  5.1   |  23  |  0.72    Ca    8.4      24 May 2025 11:50        Urinalysis Basic - ( 24 May 2025 11:50 )    Color: x / Appearance: x / SG: x / pH: x  Gluc: 112 mg/dL / Ketone: x  / Bili: x / Urobili: x   Blood: x / Protein: x / Nitrite: x   Leuk Esterase: x / RBC: x / WBC x   Sq Epi: x / Non Sq Epi: x / Bacteria: x          Radiology: Reviewed Patient is a 69y old  Male who presents with a chief complaint of     HPI:   69M PMH intrahepatic cholangiocarcinoma (on GemOX + FUDR FELIX, last dose 4/28/25), HTN, HLD, asthma, aspergillosis, prostate Ca ([facility]), who presents to the ER on recommendation of his primary oncologist for outpatient imaging concerning for pneumonia. At outpatient clinic visit on 5/12/25, treatment was held due to dry cough and fevers. Bcx drawn at that time were negative. Repeat labs were drawn on 5/23/25, which was significant for new leukocytosis and worsened transaminitis. Interval CT CAP performed on 5/22/25 showed improvement in cholangiocarcinoma/hepatic mass, however, revealed bilateral hazy opacities concerning for multilobar pneumonia. Given his symptoms, he was referred to the ER for further evaluation. Reportedly took tylenol prior to arrival.    HEMATOLOGY/ONCOLOGY HISTORY: Mr. Ritchie is a 69 year old gentleman with history of invasive aspergillosis s/p treatment, life-long asthma, and recent identification of hepatic lesions concerning for malignancy. He was at his baseline state of health until he underwent surveillance imaging for known lung disease. Due to partial abdominal imaging, he was found to have a hepatic dome lesion. Subsequent imaging identified a conglomerate of masses concerning for intra-hepatic cholangiocarcinoma. Aspergillosis diagnosed in the context of cavitating pulmonary lesions in March 2023, treated with 4 months of Posaconazole. Treated by Dr. Bland at [place]. No Pneumonitis history per Dr. Bland. No autoimmune disease. 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 Previously treated for Prostate Cancer at [facility].    Treatment history:  1/6/2025: Patient consented for [place] Cis Durva. ctDNA with FGFR2-BICC1 fusion, synonymous with cholangio. Between this and imaging, feel appropriate to initiate therapy. Biopsy of celiac node performed Friday, pending pathology.  1/27/2025: C2 TOPAZ-1 regimen. Will discuss early interval imaging for surgical candidacy. Ocular migraines, constipation controlled with miralax.  2/18/2025: C3 TOPAZ-1 regimen. MRI with reduction in gastrohepatic jaspal size, primary tumor is same size with.4cm satellite lesion.  4/14/2025: FELIX pump placed 4/2/25. Perfusion study completed with appropriate flow. Overall well. Lost 5 lbs in post-operative period.  4/28/2025: C1 GemOx + C1 FUDR FELIX. Feeling fine. When coughs/strain, feels pain over LLQ over pump area. Appetite and sleep improving. Walking 4-5 miles/day, energy improving. Normal BMs recently without any medications. No nausea.  5/12/2025: HOLDING GemOx. Here for treatment. Developed dry cough and associated fevers (102) over the weekend. Taking robitussin with mild relief. No CP, SOB, BLACKBURN. No new abd pain, diarrhea, constipation.    ?  Sites: Intrahepatic, Celiac Axis Node  Molecular: FGFR2-BICC1 Fusion, BAP1 Mutation, NF2 Splice, TET2, TP53, FGFR3 germline  Treatment: Gemcitabine, Cisplatin, Durvalumab -> HAIP pump (FUDR) + GemOx.    REVIEW OF SYSTEMS:  All other review of systems is negative unless indicated above.    OBJECTIVE:  T(C): 36.6 (05-24-25 @ 13:19), Max: 36.8 (05-24-25 @ 10:20)  HR: 94 (05-24-25 @ 13:19) (89 - 94)  BP: 150/75 (05-24-25 @ 13:19) (131/83 - 150/75)  RR: 16 (05-24-25 @ 13:19) (16 - 16)  SpO2: 97% (05-24-25 @ 13:19) (96% - 97%)  Daily Height in cm: 182.88 (24 May 2025 10:20)    Daily     Physical Exam:  General: in no acute distress  Eyes: EOMI intact bilaterally. Anicteric sclerae, moist conjunctivae  HENT: Moist mucous membranes  Neck: Trachea midline, supple  Lungs: CTA B/L. No wheezes, rales, or rhonchi  Cardiovascular: RRR. No murmurs, rubs, or gallops  Abdomen: Soft, non-tender non-distended; No rebound or guarding  Extremities: WWP, No clubbing, cyanosis or edema  MSK: No midline bony tenderness. No CVA tenderness bilaterally  Neurological: Alert and oriented x3  Skin: Warm and dry. No obvious rash     Medications:  MEDICATIONS  (STANDING):    MEDICATIONS  (PRN):      Labs:                        11.6   32.65 )-----------( 299      ( 24 May 2025 11:50 )             33.8     05-24    126[L]  |  93[L]  |  14  ----------------------------<  112[H]  5.1   |  23  |  0.72    Ca    8.4      24 May 2025 11:50    Urinalysis Basic - ( 24 May 2025 11:50 )    Color: x / Appearance: x / SG: x / pH: x  Gluc: 112 mg/dL / Ketone: x  / Bili: x / Urobili: x   Blood: x / Protein: x / Nitrite: x   Leuk Esterase: x / RBC: x / WBC x   Sq Epi: x / Non Sq Epi: x / Bacteria: x    Radiology: Reviewed Patient is a 69y old  Male who presents with a chief complaint of     HPI:   69M PMH intrahepatic cholangiocarcinoma (on GemOX + FUDR FELIX, last dose 4/28/25), HTN, HLD, asthma, aspergillosis, prostate Ca ([facility]), who presents to the ER on recommendation of his primary oncologist for outpatient imaging concerning for pneumonia. At outpatient clinic visit on 5/12/25, treatment was held due to dry cough and fevers. Bcx drawn at that time were negative. Repeat labs were drawn on 5/23/25, which was significant for new leukocytosis and worsened transaminitis. Interval CT CAP performed on 5/22/25 showed improvement in cholangiocarcinoma/hepatic mass, however, revealed bilateral hazy opacities concerning for multilobar pneumonia. Given his symptoms, he was referred to the ER for further evaluation. Notably, he has been taking high daily doses of Tylenol (650mg four times daily).    HEMATOLOGY/ONCOLOGY HISTORY: Mr. Ritchie is a 69 year old gentleman with history of invasive aspergillosis s/p treatment, life-long asthma, and recent identification of hepatic lesions concerning for malignancy. He was at his baseline state of health until he underwent surveillance imaging for known lung disease. Due to partial abdominal imaging, he was found to have a hepatic dome lesion. Subsequent imaging identified a conglomerate of masses concerning for intra-hepatic cholangiocarcinoma. Aspergillosis diagnosed in the context of cavitating pulmonary lesions in March 2023, treated with 4 months of Posaconazole. Treated by Dr. Bland at [place]. No Pneumonitis history per Dr. Bland. No autoimmune disease. 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 Previously treated for Prostate Cancer at [facility].    Treatment history:  1/6/2025: Patient consented for [place] Cis Durva. ctDNA with FGFR2-BICC1 fusion, synonymous with cholangio. Between this and imaging, feel appropriate to initiate therapy. Biopsy of celiac node performed Friday, pending pathology.  1/27/2025: C2 TOPAZ-1 regimen. Will discuss early interval imaging for surgical candidacy. Ocular migraines, constipation controlled with miralax.  2/18/2025: C3 TOPAZ-1 regimen. MRI with reduction in gastrohepatic jaspal size, primary tumor is same size with.4cm satellite lesion.  4/14/2025: FELIX pump placed 4/2/25. Perfusion study completed with appropriate flow. Overall well. Lost 5 lbs in post-operative period.  4/28/2025: C1 GemOx + C1 FUDR FELIX. Feeling fine. When coughs/strain, feels pain over LLQ over pump area. Appetite and sleep improving. Walking 4-5 miles/day, energy improving. Normal BMs recently without any medications. No nausea.  5/12/2025: HOLDING GemOx. Here for treatment. Developed dry cough and associated fevers (102) over the weekend. Taking robitussin with mild relief. No CP, SOB, BLACKBURN. No new abd pain, diarrhea, constipation.    ?  Sites: Intrahepatic, Celiac Axis Node  Molecular: FGFR2-BICC1 Fusion, BAP1 Mutation, NF2 Splice, TET2, TP53, FGFR3 germline  Treatment: Gemcitabine, Cisplatin, Durvalumab -> HAIP pump (FUDR) + GemOx.    REVIEW OF SYSTEMS:  All other review of systems is negative unless indicated above.    OBJECTIVE:  T(C): 36.6 (05-24-25 @ 13:19), Max: 36.8 (05-24-25 @ 10:20)  HR: 94 (05-24-25 @ 13:19) (89 - 94)  BP: 150/75 (05-24-25 @ 13:19) (131/83 - 150/75)  RR: 16 (05-24-25 @ 13:19) (16 - 16)  SpO2: 97% (05-24-25 @ 13:19) (96% - 97%)  Daily Height in cm: 182.88 (24 May 2025 10:20)    Daily     Physical Exam:  General: in no acute distress  Eyes: EOMI intact bilaterally. Anicteric sclerae, moist conjunctivae  HENT: Moist mucous membranes  Neck: Trachea midline, supple  Lungs: CTA B/L. No wheezes, rales, or rhonchi  Cardiovascular: RRR. No murmurs, rubs, or gallops  Abdomen: Soft, non-tender non-distended; No rebound or guarding  Extremities: WWP, No clubbing, cyanosis or edema  MSK: No midline bony tenderness. No CVA tenderness bilaterally  Neurological: Alert and oriented x3  Skin: Warm and dry. No obvious rash     Medications:  MEDICATIONS  (STANDING):    MEDICATIONS  (PRN):      Labs:                        11.6   32.65 )-----------( 299      ( 24 May 2025 11:50 )             33.8     05-24    126[L]  |  93[L]  |  14  ----------------------------<  112[H]  5.1   |  23  |  0.72    Ca    8.4      24 May 2025 11:50    Urinalysis Basic - ( 24 May 2025 11:50 )    Color: x / Appearance: x / SG: x / pH: x  Gluc: 112 mg/dL / Ketone: x  / Bili: x / Urobili: x   Blood: x / Protein: x / Nitrite: x   Leuk Esterase: x / RBC: x / WBC x   Sq Epi: x / Non Sq Epi: x / Bacteria: x    Radiology: Reviewed Patient is a 69y old  Male who presents with a chief complaint of     HPI:   69M PMH intrahepatic cholangiocarcinoma (on GemOX + FUDR FELIX, last dose 4/28/25), HTN, HLD, asthma, aspergillosis, [place] Ca ([facility] who presents to the ER on recommendation of his primary oncologist for outpatient imaging concerning for pneumonia. At outpatient clinic visit on 5/12/25, treatment was held due to dry cough and fevers. Bcx drawn at that time were negative. Repeat labs were drawn on 5/23/25, which was significant for new leukocytosis and worsened transaminitis. Interval CT CAP performed on 5/22/25 showed improvement in cholangiocarcinoma/hepatic mass, however, revealed bilateral hazy opacities concerning for multilobar pneumonia. Given his symptoms, he was referred to the ER for further evaluation.     Interviewed in ER, accompanied by wife. Feels significantly better since arrival to ED s/p IV fluids and antibiotics. Still with dry cough. Initially measuring temperatures of 102F two weeks ago at home. Has been taking high daily doses of Tylenol (650mg four times daily) along with ibuprofen, temperature now 99F at home. Endorses decreased appetite. No nausea/vomiting or abdominal pain.    HEMATOLOGY/ONCOLOGY HISTORY: Mr. Ritchie is a 69 year old gentleman with history of invasive aspergillosis s/p treatment, life-long asthma, and recent identification of hepatic lesions concerning for malignancy. He was at his baseline state of health until he underwent surveillance imaging for known lung disease. Due to partial abdominal imaging, he was found to have a hepatic dome lesion. Subsequent imaging identified a conglomerate of masses concerning for intra-hepatic cholangiocarcinoma. Aspergillosis diagnosed in the context of cavitating pulmonary lesions in March 2023, treated with 4 months of Posaconazole. Treated by Dr. Bland at [place]. No Pneumonitis history per Dr. Bland. No autoimmune disease. 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 Previously treated for Prostate Cancer at [facility].    Treatment history:  1/6/2025: Patient consented for [place] Cis Durva. ctDNA with FGFR2-BICC1 fusion, synonymous with cholangio. Between this and imaging, feel appropriate to initiate therapy. Biopsy of celiac node performed Friday, pending pathology.  1/27/2025: C2 TOPAZ-1 regimen. Will discuss early interval imaging for surgical candidacy. Ocular migraines, constipation controlled with miralax.  2/18/2025: C3 TOPAZ-1 regimen. MRI with reduction in gastrohepatic jaspal size, primary tumor is same size with.4cm satellite lesion.  4/14/2025: FELIX pump placed 4/2/25. Perfusion study completed with appropriate flow. Overall well. Lost 5 lbs in post-operative period.  4/28/2025: C1 GemOx + C1 FUDR FELIX. Feeling fine. When coughs/strain, feels pain over LLQ over pump area. Appetite and sleep improving. Walking 4-5 miles/day, energy improving. Normal BMs recently without any medications. No nausea.  5/12/2025: HOLDING GemOx. Here for treatment. Developed dry cough and associated fevers (102) over the weekend. Taking robitussin with mild relief. No CP, SOB, BLACKBURN. No new abd pain, diarrhea, constipation.    ?  Sites: Intrahepatic, Celiac Axis Node  Molecular: FGFR2-BICC1 Fusion, BAP1 Mutation, NF2 Splice, TET2, TP53, FGFR3 germline  Treatment: Gemcitabine, Cisplatin, Durvalumab -> HAIP pump (FUDR) + GemOx.    REVIEW OF SYSTEMS:  All other review of systems is negative unless indicated above.    OBJECTIVE:  T(C): 36.6 (05-24-25 @ 13:19), Max: 36.8 (05-24-25 @ 10:20)  HR: 94 (05-24-25 @ 13:19) (89 - 94)  BP: 150/75 (05-24-25 @ 13:19) (131/83 - 150/75)  RR: 16 (05-24-25 @ 13:19) (16 - 16)  SpO2: 97% (05-24-25 @ 13:19) (96% - 97%)  Daily Height in cm: 182.88 (24 May 2025 10:20)    Daily     Physical Exam:  General: in no acute distress, speaking in full sentences on room air  Eyes: EOMI intact bilaterally. Anicteric sclerae, moist conjunctivae  HENT: Moist mucous membranes  Neck: Trachea midline, supple  Lungs: CTA B/L. No wheezes. +Bibasilar crackles.  Cardiovascular: RRR. No murmurs, rubs, or gallops  Abdomen: Soft, non-tender non-distended; No rebound or guarding.  Extremities: WWP, No clubbing, cyanosis. 1+ pitting edema to bilateral shins.  MSK: No midline bony tenderness. No CVA tenderness bilaterally  Neurological: Alert and appropriately conversant.  Skin: Warm and dry. No obvious rash. FELIX pump scar well healed, without tenderness, warmth or erythema. Right chest wall mediport site CDI, nontender.    Medications:  MEDICATIONS  (STANDING):    MEDICATIONS  (PRN):      Labs:                        11.6   32.65 )-----------( 299      ( 24 May 2025 11:50 )             33.8     05-24    126[L]  |  93[L]  |  14  ----------------------------<  112[H]  5.1   |  23  |  0.72    Ca    8.4      24 May 2025 11:50    Urinalysis Basic - ( 24 May 2025 11:50 )    Color: x / Appearance: x / SG: x / pH: x  Gluc: 112 mg/dL / Ketone: x  / Bili: x / Urobili: x   Blood: x / Protein: x / Nitrite: x   Leuk Esterase: x / RBC: x / WBC x   Sq Epi: x / Non Sq Epi: x / Bacteria: x    Radiology: Reviewed

## 2025-05-24 NOTE — CONSULT NOTE ADULT - ATTENDING COMMENTS
I evaluated the patient with the hematology oncology fellow, Dr Cueto.  The patient is a 69 year old man with intrahepatic cholangiocarcinoma on treatment with gem/ox in conjunction with FUDR administered via hepatic artery infusion pump.  he is admitted for pneumonia.  Labs also remarkable for hyponatremia.  The patient was started on parenteral antibiotics on admission.  Modest improvement in the leukocytosis overnight    Recommendations  #  cholangiocarcinoma  - hold chemotherapy until infection has been treated  - pt has FELIX pump, is due for reservoir fill next week.  To coordinate w/ Dr Hancock if the patient is unable to keep his appointment on 5/27    #  pneumonia  - continue antibiotics, low threshold to broaden coverage to cover hospital acquired pathogens if continued improvement is not demonstrated    #  elevated alk phos  - noted, to be reviewed w/ primary medical oncologist; may be related to FELIX pump/FUDR therapy.  Trend.

## 2025-05-24 NOTE — H&P ADULT - PROBLEM SELECTOR PLAN 3
Likely iso Tylenol use (3 g per day x 12 days)  - Acetaminophen level , AST 51,  on outpatient labs 5/23/25.   , AST hemolyzed,  on admission.  Likely iso Tylenol use (3 g per day x 12 days)  - f/u Acetaminophen level  - Monitor LFTs QD for now

## 2025-05-24 NOTE — ED PROVIDER NOTE - ATTENDING APP SHARED VISIT CONTRIBUTION OF CARE
The pt is a 68 y/o M, PMH  intra-hepatic cholangiocarcinoma (last chemo 4/28), HTN, HLD, who was sent to ED by onc for multilobar pna and wbc of 33 on outpatient labs/imaging from few d ago. Pt c/o being sick x few wks, dry cough, low grade fever, fatigue and malaise. Taking tyl w/temporary relief. Denies cp, sob, n/v/d, abd pain, dizziness, syncope.    pt w/confirmed multifocal pna on outpatient ct and leukocytosis of 32 on outpatient labs, currently on chemo for ca, sent in for eval. pt c/o cough, myalgias and low grade fevers, well appearing, non toxic, afebrile in no resp distress, leukocytosis confirmed, cxr w/pna, lactate slightly elevated but downtrending after ivf, pan cultured, abx given right away given known source, oncology involved/aware, will admit for iv abx, stable for RMF    Addendum to attending statement: I have reviewed the ACP note and agree with the history, exam, and plan of care. I  was available to PA   as a supervising provider if needed. PA given opportunity to ask questions and request further evaluation / care.    Pt with ca on chemo presenting with fever and elevated wbc count with multipfocal pna on outpt imaging.  Sepsis work up in ED and discussed with heme onc who agrees on admission for management and IV antibiotics

## 2025-05-24 NOTE — H&P ADULT - PROBLEM SELECTOR PLAN 4
Heme/Onc consulted for known intrahepatic cholangiocarcinoma   - Appreciate recs Heme/Onc consulted for known intrahepatic cholangiocarcinoma. No plans for inpatient chemotherapy at this time  Patient has follow up with his oncologist, Dr. Irvin Hancock on 5/27/25.

## 2025-05-24 NOTE — ED PROVIDER NOTE - CLINICAL SUMMARY MEDICAL DECISION MAKING FREE TEXT BOX
pt w/confirmed multifocal pna on outpatient ct and leukocytosis of 32 on outpatient labs, currently on chemo for ca, sent in for eval. pt c/o cough, myalgias and low grade fevers, well appearing, non toxic, afebrile in no resp distress, leukocytosis confirmed, cxr w/pna, lactate slightly elevated but downtrending after ivf, pan cultured, abx given right away given known source, oncology involved/aware, will admit for iv abx, stable for RMF

## 2025-05-24 NOTE — H&P ADULT - PROBLEM SELECTOR PLAN 8
- C/w home regimen Home reg: Singulair 10 mg QD, Albuterol inhaler, Arnuity Ellipta  - C/w home regimen

## 2025-05-24 NOTE — ED ADULT TRIAGE NOTE - AVIAN FLU CONSUME
ELECTROPHYSIOLOGY BRIEF POST-OP NOTE    I have personally seen and examined the patient. I agree with the history and physical which I have reviewed and noted any changes below.     PRE-OP DIAGNOSIS: NIDCM    POST-OP DIAGNOSIS: same    PROCEDURE: Biventricular ICD implantation    Physician: Gibran Cuadra MD  Assistant: None    ESTIMATED BLOOD LOSS: <10 mL    ANESTHESIA TYPE:  [   ]General Anesthesia  [ x ]Sedation  [ x ]Local/Regional    CONDITION:  [  ]Critical  [  ]Serious  [ x ]Stable  [ x ]Good    SPECIMENS REMOVED (if applicable): Nonce    IMPLANT (if applicable): Medtronic CRT-D implantation via left axillary stick    Programmed: DDD     EKG: pending    Vital Signs Last 24 Hrs  T(C): 36.5 (02 Jul 2019 10:31), Max: 36.5 (02 Jul 2019 10:31)  T(F): 97.7 (02 Jul 2019 10:31), Max: 97.7 (02 Jul 2019 10:31)  HR: 78 (02 Jul 2019 14:05) (70 - 88)  BP: 102/56 (02 Jul 2019 14:05) (99/52 - 125/57)  BP(mean): 7 (02 Jul 2019 10:31) (7 - 7)  RR: 15 (02 Jul 2019 14:05) (15 - 16)  SpO2: 95% (02 Jul 2019 14:05) (95% - 98%)    Physical Exam:  Constitutional: NAD, AAOx3  Cardiovascular: +S1S2 RRR  Pulmonary: CTA b/l, unlabored. Left chest wall with dressing CDI  GI: soft NTND +BS  Extremities: no pedal edema,   Neuro: non focal, CANCINO x4    A/P  79 year old female with history of ovarian cancer (s/p chemo/surgery, now in remission), HTN, hypothyroidism, hyperlipidemia, dilated non-ischemic cardiomyopathy, EF <35%, and LBBB who is now s/p uncomplicated Medtronic CRT-D implantation via left axillary stick.      -Clindamycin 900mg IVPB x 1 more dose  -Chest X-ray STAT to r/o PTX  -Chest X-ray PA and LAT in am to eval lead position  -NO LOVENOX OR HEPARIN INCLUDING SQ UNLESS CLEARED BY EP  -no lifting affected arm over shoulder x 6 weeks  -AM device check  -AM labs and EKG  -Discharge planning home tomorrow if stable. No

## 2025-05-24 NOTE — H&P ADULT - PROBLEM SELECTOR PLAN 5
Patient with known hx AoCD Patient with known hx AoCD. Hb currently at baseline.   - Active T&S  - Transfuse for Hb < 7

## 2025-05-24 NOTE — ED ADULT NURSE NOTE - OBJECTIVE STATEMENT
Pt alert, oriented, and ambulatory at time of assessment. Reports cough, fatigue, and SOB with exertion x 2 weeks. Denies dizziness or weakness. On chemo: missed last week due to symptomology. Denies chest pain. Endorses intermittent fevers/chills. Denies N/V.

## 2025-05-24 NOTE — CONSULT NOTE ADULT - ASSESSMENT
INCOMPLETE NOTE 69M PMH intrahepatic cholangiocarcinoma (on GemOX + FUDR FELIX, last dose 4/28/25), HTN, HLD, asthma, aspergillosis, prostate Ca ([facility] [place]), who presents to the ER on recommendation of his primary oncologist for outpatient imaging concerning for pneumonia, admitted for further management. Heme/onc consulted for known intrahepatic cholangiocarcinoma.    # intrahepatic cholangiocarcinoma  -  -  -  -    Plan:  - appreciate treatment of underlying infection per primary team  - no plans for inpatient chemotherapy at this time  - will need follow up with Dr. Irvin Hancock upon discharge; heme/onc to arrange    Heme/onc will continue to follow. Case discussed with Dr. Khadijah Felipe. Recommendations final upon attending physician attestation.  69M PMH intrahepatic cholangiocarcinoma (on GemOX + FUDR FELIX, last dose 4/28/25), HTN, HLD, asthma, aspergillosis, prostate Ca ([facility] [place]), who presents to the ER on recommendation of his primary oncologist for outpatient imaging concerning for pneumonia, admitted for further management. Heme/onc consulted for known intrahepatic cholangiocarcinoma.    # intrahepatic cholangiocarcinoma  # pneumonia    - Notes two weeks of fevers.   -   -   -     Plan:  - appreciate treatment of underlying infection per primary team  - will follow up Bcx  - no plans for inpatient chemotherapy at this time  - will need follow up with Dr. Irvin Hancock upon discharge; heme/onc to arrange    Heme/onc will continue to follow. Case discussed with Dr. Khadijah Felipe. Recommendations final upon attending physician attestation.  69M PMH intrahepatic cholangiocarcinoma (on GemOX + FUDR FELIX, last dose 4/28/25), HTN, HLD, asthma, aspergillosis, prostate Ca ([facility] [place]), who presents to the ER on recommendation of his primary oncologist for outpatient imaging concerning for pneumonia, admitted for further management. Heme/onc consulted for known intrahepatic cholangiocarcinoma.    # intrahepatic cholangiocarcinoma  # pneumonia  # transaminitis    - Notes two weeks of fevers, night sweats and rigors with nonproductive cough. Outpatient CT notable for multilobar PNA.  - Not meeting severe sepsis criteria on admission, however, suspect fever has been masked by antipyretics. Impressive leukocytosis with lactic acidosis.  - CT CAP 5/22/25 showing interval improvement in hepatic mass/cholangiocarcinoma.  - Has been using high daily doses of Tylenol for fevers; suspect transaminitis is sequelae of this. No n/v or abdominal pain.    Plan:  - appreciate treatment of underlying infection per primary team  - would use Tylenol sparingly ISO transaminitis  - will follow up Bcx  - no plans for inpatient chemotherapy at this time  - has follow up with Dr. Irvin Hancock on 5/27/25; please include in discharge summary    Heme/onc will continue to follow. Case discussed with Dr. Khadijah Felipe. Recommendations final upon attending physician attestation.

## 2025-05-24 NOTE — ED PROVIDER NOTE - CONSULTANT FREE TEXT FOR MDM DISCUSSED CASE WITH QUESTION
oncology -- confirmed that pt had outpatient ct that showed multilobar pna and leukocytosis of 32 on 5/23, requesting viral swab and bl cx

## 2025-05-24 NOTE — H&P ADULT - PROBLEM SELECTOR PLAN 7
- C/w Atorvastatin 10 mg - Hold home Atorvastatin 10 mg iso transaminitis -c/w home Atorvastatin 10 mg

## 2025-05-24 NOTE — H&P ADULT - PROBLEM SELECTOR PLAN 6
- C/w with Posaconazole for ppx of invasive fungal infections Aspergillosis diagnosed in the context of cavitating pulmonary lesions, treated with  4 months of Posaconazole in 3/23 by Dr. Bland at [place]. Patient currently takes Posaconazole 300 mg QD for ppx of invasive fungal infections  - C/w with Posaconazole 300 mg QD

## 2025-05-24 NOTE — H&P ADULT - PROBLEM SELECTOR PLAN 10
DVT: Lovenox  GI: Home Protonix 40 mg   IVF: s/p 3 L NS  E: Replete as needed   N: Regular, K restricted

## 2025-05-24 NOTE — H&P ADULT - NSHPPHYSICALEXAM_GEN_ALL_CORE
VITAL SIGNS:  T(C): 36.6 (05-24-25 @ 13:19), Max: 36.8 (05-24-25 @ 10:20)  T(F): 97.8 (05-24-25 @ 13:19), Max: 98.2 (05-24-25 @ 10:20)  HR: 94 (05-24-25 @ 13:19) (89 - 94)  BP: 150/75 (05-24-25 @ 13:19) (131/83 - 150/75)  BP(mean): --  RR: 16 (05-24-25 @ 13:19) (16 - 16)  SpO2: 97% (05-24-25 @ 13:19) (96% - 97%)  Wt(kg): --    PHYSICAL EXAM:    Constitutional: resting comfortably in bed; NAD  Head: NC/AT  Eyes: PERRL, EOMI, anicteric sclera  ENT: no nasal discharge; uvula midline, no oropharyngeal erythema or exudates; MMM  Neck: supple; no JVD or thyromegaly  Respiratory: CTA B/L; no W/R/R, no retractions  Cardiac: +S1/S2; RRR; no M/R/G; PMI non-displaced  Gastrointestinal: abdomen soft, NT/ND; no rebound or guarding; +BSx4  Back: spine midline, no bony tenderness or step-offs; no CVAT B/L  Extremities: WWP, no clubbing or cyanosis; no peripheral edema  Musculoskeletal: NROM x4; no joint swelling, tenderness or erythema  Vascular: 2+ radial, DP/PT pulses B/L  Dermatologic: skin warm, dry and intact; no rashes, wounds, or scars  Lymphatic: no submandibular or cervical LAD  Neurologic: AAOx3; CNII-XII grossly intact; no focal deficits  Psychiatric: affect and characteristics of appearance, verbalizations, behaviors are appropriate VITAL SIGNS:  T(C): 36.6 (05-24-25 @ 13:19), Max: 36.8 (05-24-25 @ 10:20)  T(F): 97.8 (05-24-25 @ 13:19), Max: 98.2 (05-24-25 @ 10:20)  HR: 94 (05-24-25 @ 13:19) (89 - 94)  BP: 150/75 (05-24-25 @ 13:19) (131/83 - 150/75)  BP(mean): --  RR: 16 (05-24-25 @ 13:19) (16 - 16)  SpO2: 97% (05-24-25 @ 13:19) (96% - 97%)  Wt(kg): --    PHYSICAL EXAM:    Constitutional: resting comfortably in bed; NAD  Head: NC/AT  Eyes: PERRL, EOMI, anicteric sclera  ENT: no nasal discharge; uvula midline, no oropharyngeal erythema or exudates; MMM  Neck: supple; no JVD or thyromegaly  Chest: R chemo port   Respiratory: Rales L side, no W/R, no retractions  Cardiac: +S1/S2; RRR; no M/R/G; PMI non-displaced  Gastrointestinal: abdomen soft, NT/ND; no rebound or guarding; +BSx4, +HAIP  Back: spine midline, no bony tenderness or step-offs; no CVAT B/L  Extremities: WWP, no clubbing or cyanosis; no peripheral edema  Vascular: 2+ radial, DP/PT pulses B/L  Dermatologic: skin warm, dry and intact; no rashes, wounds, or scars  Neurologic: AAOx3; CNII-XII grossly intact; no focal deficits

## 2025-05-24 NOTE — H&P ADULT - PROBLEM SELECTOR PLAN 9
DVT: Lovenox Patient is on Diltiazem 120 mg ER QD for "premature heart beats".   Confirmed in dispense hx and HIE.   Possibly iso ventricular PVCs vs SVT. However EKG today with normal rate and sinus rhythm.   - Obtain collateral   - C/w home Diltiazem 120 mg ER QD

## 2025-05-24 NOTE — H&P ADULT - ATTENDING COMMENTS
70 y/o male PMHx of  cholangiocarcinoma  s/p chemo (right chest chemo port), ex lap, hepatic artery infusion pump (HAIP), celiac LN Bx and girish on 4/2/25, invasive aspergillosis s/p 4 months of Posaconazole 3/23, ocular toxoplasmosis,  prostate cancer s/p radiation in 2014, HTN, HLD, aortic aneurysm, colonic polyps, asthma (mild intermittent), GERD, admitted for severe sepsis and hyponatremia. CT chest c/f multifocal pna.   on PE: resting comfortably in bed on RA. +MMM, no jvd, neck supple, s1s2 no murmur, CTA b/l lung fields, abd soft, +HAIP, and R chest port w/o tenderness/sorrounding erythema,    -c/w Ceft 2g QD, legionella neg. F/up sputum cx, strep ag, MRSA swab, blood cx, urine cx.      -f/up urine lytes      -c/w fluid restriction, Na now improving. Consider salt tabs      -trend bmp, avoid overcorrection.      -f/up heme onc recs      - 70 y/o male PMHx of  cholangiocarcinoma  s/p chemo (right chest chemo port), ex lap, hepatic artery infusion pump (HAIP), celiac LN Bx and girish on 4/2/25, invasive aspergillosis s/p 4 months of Posaconazole 3/23, ocular toxoplasmosis,  prostate cancer s/p radiation in 2014, HTN, HLD, aortic aneurysm, colonic polyps, asthma (mild intermittent), GERD, admitted for severe sepsis and hyponatremia. CT chest c/f multifocal pna.   on PE: resting comfortably in bed on RA. +MMM, no jvd, neck supple, s1s2 no murmur, CTA b/l lung fields, abd soft, ND/NT, +HAIP, and R chest port w/o tenderness/erythema, trace b/l LE edema.     Plan  -c/w Ceft 2g QD, legionella negative. F/up sputum cx, strep ag, MRSA swab, blood cx, urine cx.    -remains afebrile-refused rectal temp. HDS, low threshold to broaden abx if worsening sepsis   -f/up urine lytes    -c/w fluid restriction, Na now improving. Consider salt tabs    -trend bmp, avoid overcorrection.    -f/up heme onc recs  - rest of plan as above

## 2025-05-24 NOTE — H&P ADULT - PROBLEM SELECTOR PLAN 2
Na 125 on admission. Patient a&o x 4. Mentating well. Asymptomatic.   Patient received 3 L NS prior to collection of Ur studies which suggest hypotonic hyponatremia.   Repeat BMP unchanged.   Possibly in the setting of SIADH.   - Monitor BMP q6h   - Hold off on additional IVF Na 125 on admission. Patient a&o x 4. Mentating well. Asymptomatic.   Patient received 3 L NS prior to collection of Ur studies which suggest hypotonic hyponatremia.   Repeat BMP unchanged.   Possibly in the setting of SIADH.   - Monitor BMP q6h   - Hold off on additional IVF; Fluid restrict 1.5 L and repeat urine studies in AM Na 125 on admission. Patient a&o x 4. Mentating well. Asymptomatic.   Patient received 3 L NS prior to collection of Ur studies which suggest hypotonic hyponatremia. appears euvolemic on exam, and sodium unchanged s/p fluids. ?SIADH   - Monitor BMP q6h w/ repeat urine lytes  - start Fluid restrict 1.5 L   - repeat BMP 10pm

## 2025-05-24 NOTE — ED ADULT TRIAGE NOTE - ARRIVAL FROM
We will call with your throat and urine culture result. Tylenol and Motrin as needed for pain. Go to the emergency room with any new or worsening symptoms. Home

## 2025-05-24 NOTE — H&P ADULT - HISTORY OF PRESENT ILLNESS
70 y/o male PMHx of prostate cancer (2014) s/p radiation, HTN HLD aortic aneurysm, colonic polyps, asthma (mild intermittent), GERD with reflux esophagitis, lung nodule presents to presurgical testing with diagnosis of intrahepatic bile duct carcinoma s/p chemo (right chest chemo port), ex lap, HAIP, celiac LN Bx and girish on 4/2 presenting with flu like symptoms. Patient was sent in by his oncologist for multilobar PNA on CT along with WBC 33 on outpatient imaging and labs.   Denies any recent travel hx.     ED course:  VS: T 98.2, HR 89, /83, RR 16, SpO2 96 on RA   Labs s/f WBC 32, Hb 11.6, Na 126, K 5.1,   Imaging:    CXR with bilateral basilar infiltrates left > right.  CT chest/abdomen/pelvis with decreased size of known hepatic mass/cholangiocarcinoma. Bilateral patchy hazy and airspace/consolidative lung opacities. Likely multilobar pneumonia. However, cannot exclude possible metastasis in the more consolidative opacities.   UA negative   RVP pending   Interventions: CTX 2 g, Azithro 500 mg, hycodan, albuterol neb, NS 3 L    70 y/o male PMHx of  cholangiocarcinoma  s/p chemo (right chest chemo port), ex lap, hepatic artery infusion pump (HAIP), celiac LN Bx and girish on 4/2/25, invasive  aspergillosis s/p 4 months of Posaconazole 3/23, ocular toxoplasmosis, prostate cancer s/p radiation in 2014, HTN, HLD, aortic aneurysm, colonic polyps, asthma (mild intermittent), GERD with reflux esophagitis, presenting for multilobar PNA on outpatient imaging.  At his outpatient oncology visit on 5/12/25, his chemo treatment was held due to dry cough, shortness of breath, rhinorrhea, fevers (initially 102, later  F). His wife had similar symptoms the week prior.   Bcx drawn at that visit were negative. Labs were significant for WBC 33 and worsening transaminitis. CT CAP showed improvement in cholangiocarcinoma/hepatic mass, however, revealed bilateral hazy opacities concerning for multilobar pneumonia. Given his symptoms, he was referred to the ER for further evaluation by Dr. Irvin Hancock. He has been taking Tylenol 3 g per day and Prednisone 10 mg x 7 days. Not prescribed antibiotics outpatient.      ED course:  VS: T 98.2, HR 89, /83, RR 16, SpO2 96 on RA   Labs s/f WBC 32, Hb 11.6, Na 125, K 5.1,   Imaging:    CXR with bilateral basilar infiltrates left > right.  CT chest/abdomen/pelvis with decreased size of known hepatic mass/cholangiocarcinoma. Bilateral patchy hazy and airspace/consolidative lung opacities. Likely multilobar pneumonia. However, cannot exclude possible metastasis in the more consolidative opacities.   UA negative   RVP pending   Interventions: CTX 2 g, Azithro 500 mg, hycodan, albuterol neb, NS 3 L    70 y/o male PMHx of  cholangiocarcinoma  s/p chemo (right chest chemo port), ex lap, hepatic artery infusion pump (HAIP), celiac LN Bx and girish on 4/2/25, invasive  aspergillosis s/p 4 months of Posaconazole 3/23, ocular toxoplasmosis, prostate cancer s/p radiation in 2014, HTN, HLD, aortic aneurysm, colonic polyps, asthma (mild intermittent), GERD with reflux esophagitis, presenting for multilobar PNA on outpatient imaging.  At his outpatient oncology visit on 5/12/25, his chemo treatment was held due to dry cough, shortness of breath, rhinorrhea, fevers (initially 102, later  F). His wife had similar symptoms the week prior.   Bcx drawn at that visit were negative. Labs were significant for WBC 33 and worsening transaminitis. CT CAP showed improvement in cholangiocarcinoma/hepatic mass, however, revealed bilateral hazy opacities concerning for multilobar pneumonia. Given his symptoms, he was referred to the ER for further evaluation by Dr. Irvin Hancock. He has been taking Tylenol 3 g per day and Prednisone 10 mg x 7 days. Not prescribed antibiotics outpatient.      ED course:  VS: T 98.2, HR 89, /83, RR 16, SpO2 96 on RA   Labs s/f WBC 32, Hb 11.6, Na 125, K 5.1, lactate 2.3  Imaging:    CXR with bilateral basilar infiltrates left > right.  CT chest/abdomen/pelvis with decreased size of known hepatic mass/cholangiocarcinoma. Bilateral patchy hazy and airspace/consolidative lung opacities. Likely multilobar pneumonia. However, cannot exclude possible metastasis in the more consolidative opacities.   UA negative   RVP pending   Interventions: CTX 2 g, Azithro 500 mg, hycodan, albuterol neb, NS 3 L

## 2025-05-24 NOTE — H&P ADULT - PROBLEM SELECTOR PLAN 1
Pt does not meets criteria for SIRS/Sepsis (1/4 for leukocytosis), however has PNA as source of infection on imaging.   Of note fevers may have been masked by Tylenol use at home.   Also found to have lactic acidosis; now resolved s/p 3 L NS in the ED.  Given 2 wks of symptoms without improvement, likely viral PNA with superimposed bacterial PNA, favor covering for CAP in immunocompromised patient.   - Continue with CTX and Azithro   - f/u urine strep and legionella   - f/u full RVP   - f/u sputum cultures   - f/u blood cultures Pt does not meets criteria for SIRS/Sepsis (1/4 for leukocytosis), however has PNA as source of infection on imaging.   Of note fevers may have been masked by Tylenol use at home.   Also found to have lactic acidosis; now resolved s/p 3 L NS in the ED.  Given 2 wks of symptoms without improvement, likely viral PNA with superimposed bacterial PNA, favor covering for CAP in immunocompromised patient.   - Would broaden coverage to cefepime   - f/u urine strep and legionella   - f/u full RVP   - f/u MRSA swab   - f/u sputum cultures   - f/u blood cultures Pt does not meets criteria for SIRS/Sepsis (1/4 for leukocytosis), however has PNA as source of infection on imaging.   Of note fevers may have been masked by Tylenol use at home.   Also found to have lactic acidosis; now resolved s/p 3 L NS in the ED.   Urine strep and legionella negative, full RVP negative.   Given 2 wks of symptoms without improvement, likely viral PNA with superimposed bacterial PNA, favor covering for CAP in immunocompromised patient.   - Continue with CTX 2 g   - f/u MRSA swab  - f/u sputum cultures   - f/u blood cultures

## 2025-05-24 NOTE — H&P ADULT - NSHPLABSRESULTS_GEN_ALL_CORE
11.6   32.65 )-----------( 299      ( 24 May 2025 11:50 )             33.8       05-24    126[L]  |  93[L]  |  14  ----------------------------<  112[H]  5.1   |  23  |  0.72    Ca    8.4      24 May 2025 11:50                Urinalysis Basic - ( 24 May 2025 11:50 )    Color: x / Appearance: x / SG: x / pH: x  Gluc: 112 mg/dL / Ketone: x  / Bili: x / Urobili: x   Blood: x / Protein: x / Nitrite: x   Leuk Esterase: x / RBC: x / WBC x   Sq Epi: x / Non Sq Epi: x / Bacteria: x            Lactate Trend  05-24 @ 14:20 Lactate:2.2   05-24 @ 12:55 Lactate:2.3   05-24 @ 11:20 Lactate:2.3             CAPILLARY BLOOD GLUCOSE            Urinalysis with Rflx Culture (collected 05-24-25 @ 11:46)

## 2025-05-24 NOTE — H&P ADULT - ASSESSMENT
68 y/o male PMHx of  cholangiocarcinoma  s/p chemo (right chest chemo port), ex lap, hepatic artery infusion pump (HAIP), celiac LN Bx and girish on 4/2/25, invasive aspergillosis s/p 4 months of Posaconazole 3/23, ocular toxoplasmosis,  prostate cancer s/p radiation in 2014, HTN, HLD, aortic aneurysm, colonic polyps, asthma (mild intermittent), GERD with reflux esophagitis, presenting for multilobar PNA on outpatient imaging.

## 2025-05-24 NOTE — ED ADULT TRIAGE NOTE - CHIEF COMPLAINT QUOTE
Pt presents to ED C/O flu-like symptoms with fever at home. Pt states, " They said I have viral pneumonia and to come to the ER". Pt of Dr. Hancock. Hx liver CA. On chemotherapy. Pt reports taking Tylenol at 5AM PTA.

## 2025-05-24 NOTE — ED PROVIDER NOTE - OBJECTIVE STATEMENT
The pt is a 68 y/o M, PMH  intra-hepatic cholangiocarcinoma (last chemo 4/28), HTN, HLD, who was sent to ED by onc for multilobar pna and wbc of 33 on outpatient labs/imaging from few d ago. Pt c/o being sick x few wks, dry cough, low grade fever, fatigue and malaise. Taking tyl w/temporary relief. Denies cp, sob, n/v/d, abd pain, dizziness, syncope.

## 2025-05-25 LAB
ALBUMIN SERPL ELPH-MCNC: 2.8 G/DL — LOW (ref 3.3–5)
ALP SERPL-CCNC: 553 U/L — HIGH (ref 40–120)
ALT FLD-CCNC: 99 U/L — HIGH (ref 10–45)
ANION GAP SERPL CALC-SCNC: 10 MMOL/L — SIGNIFICANT CHANGE UP (ref 5–17)
ANION GAP SERPL CALC-SCNC: 12 MMOL/L — SIGNIFICANT CHANGE UP (ref 5–17)
ANION GAP SERPL CALC-SCNC: 13 MMOL/L — SIGNIFICANT CHANGE UP (ref 5–17)
AST SERPL-CCNC: 31 U/L — SIGNIFICANT CHANGE UP (ref 10–40)
BASOPHILS # BLD AUTO: 0.06 K/UL — SIGNIFICANT CHANGE UP (ref 0–0.2)
BASOPHILS NFR BLD AUTO: 0.2 % — SIGNIFICANT CHANGE UP (ref 0–2)
BILIRUB SERPL-MCNC: 0.9 MG/DL — SIGNIFICANT CHANGE UP (ref 0.2–1.2)
BUN SERPL-MCNC: 14 MG/DL — SIGNIFICANT CHANGE UP (ref 7–23)
BUN SERPL-MCNC: 16 MG/DL — SIGNIFICANT CHANGE UP (ref 7–23)
BUN SERPL-MCNC: 18 MG/DL — SIGNIFICANT CHANGE UP (ref 7–23)
CALCIUM SERPL-MCNC: 8.3 MG/DL — LOW (ref 8.4–10.5)
CALCIUM SERPL-MCNC: 8.6 MG/DL — SIGNIFICANT CHANGE UP (ref 8.4–10.5)
CALCIUM SERPL-MCNC: 8.9 MG/DL — SIGNIFICANT CHANGE UP (ref 8.4–10.5)
CHLORIDE SERPL-SCNC: 92 MMOL/L — LOW (ref 96–108)
CO2 SERPL-SCNC: 21 MMOL/L — LOW (ref 22–31)
CO2 SERPL-SCNC: 22 MMOL/L — SIGNIFICANT CHANGE UP (ref 22–31)
CO2 SERPL-SCNC: 23 MMOL/L — SIGNIFICANT CHANGE UP (ref 22–31)
CREAT SERPL-MCNC: 0.85 MG/DL — SIGNIFICANT CHANGE UP (ref 0.5–1.3)
CREAT SERPL-MCNC: 0.85 MG/DL — SIGNIFICANT CHANGE UP (ref 0.5–1.3)
CREAT SERPL-MCNC: 0.86 MG/DL — SIGNIFICANT CHANGE UP (ref 0.5–1.3)
EGFR: 95 ML/MIN/1.73M2 — SIGNIFICANT CHANGE UP
EOSINOPHIL # BLD AUTO: 0.02 K/UL — SIGNIFICANT CHANGE UP (ref 0–0.5)
EOSINOPHIL NFR BLD AUTO: 0.1 % — SIGNIFICANT CHANGE UP (ref 0–6)
GLUCOSE SERPL-MCNC: 114 MG/DL — HIGH (ref 70–99)
GLUCOSE SERPL-MCNC: 123 MG/DL — HIGH (ref 70–99)
GLUCOSE SERPL-MCNC: 131 MG/DL — HIGH (ref 70–99)
GRAM STN FLD: ABNORMAL
HCT VFR BLD CALC: 33.4 % — LOW (ref 39–50)
HGB BLD-MCNC: 11.3 G/DL — LOW (ref 13–17)
IMM GRANULOCYTES NFR BLD AUTO: 3.6 % — HIGH (ref 0–0.9)
LYMPHOCYTES # BLD AUTO: 1.23 K/UL — SIGNIFICANT CHANGE UP (ref 1–3.3)
LYMPHOCYTES # BLD AUTO: 4.6 % — LOW (ref 13–44)
MAGNESIUM SERPL-MCNC: 2 MG/DL — SIGNIFICANT CHANGE UP (ref 1.6–2.6)
MCHC RBC-ENTMCNC: 33.4 PG — SIGNIFICANT CHANGE UP (ref 27–34)
MCHC RBC-ENTMCNC: 33.8 G/DL — SIGNIFICANT CHANGE UP (ref 32–36)
MCV RBC AUTO: 98.8 FL — SIGNIFICANT CHANGE UP (ref 80–100)
MONOCYTES # BLD AUTO: 1.3 K/UL — HIGH (ref 0–0.9)
MONOCYTES NFR BLD AUTO: 4.9 % — SIGNIFICANT CHANGE UP (ref 2–14)
NEUTROPHILS # BLD AUTO: 23.22 K/UL — HIGH (ref 1.8–7.4)
NEUTROPHILS NFR BLD AUTO: 86.6 % — HIGH (ref 43–77)
NRBC BLD AUTO-RTO: 0 /100 WBCS — SIGNIFICANT CHANGE UP (ref 0–0)
PHOSPHATE SERPL-MCNC: 4 MG/DL — SIGNIFICANT CHANGE UP (ref 2.5–4.5)
PLATELET # BLD AUTO: 305 K/UL — SIGNIFICANT CHANGE UP (ref 150–400)
POTASSIUM SERPL-MCNC: 4.6 MMOL/L — SIGNIFICANT CHANGE UP (ref 3.5–5.3)
POTASSIUM SERPL-MCNC: 4.9 MMOL/L — SIGNIFICANT CHANGE UP (ref 3.5–5.3)
POTASSIUM SERPL-MCNC: 5.1 MMOL/L — SIGNIFICANT CHANGE UP (ref 3.5–5.3)
POTASSIUM SERPL-SCNC: 4.6 MMOL/L — SIGNIFICANT CHANGE UP (ref 3.5–5.3)
POTASSIUM SERPL-SCNC: 4.9 MMOL/L — SIGNIFICANT CHANGE UP (ref 3.5–5.3)
POTASSIUM SERPL-SCNC: 5.1 MMOL/L — SIGNIFICANT CHANGE UP (ref 3.5–5.3)
PROT SERPL-MCNC: 6.5 G/DL — SIGNIFICANT CHANGE UP (ref 6–8.3)
RBC # BLD: 3.38 M/UL — LOW (ref 4.2–5.8)
RBC # FLD: 14 % — SIGNIFICANT CHANGE UP (ref 10.3–14.5)
SODIUM SERPL-SCNC: 125 MMOL/L — LOW (ref 135–145)
SODIUM SERPL-SCNC: 126 MMOL/L — LOW (ref 135–145)
SODIUM SERPL-SCNC: 126 MMOL/L — LOW (ref 135–145)
SPECIMEN SOURCE: SIGNIFICANT CHANGE UP
WBC # BLD: 26.79 K/UL — HIGH (ref 3.8–10.5)
WBC # FLD AUTO: 26.79 K/UL — HIGH (ref 3.8–10.5)

## 2025-05-25 PROCEDURE — 99233 SBSQ HOSP IP/OBS HIGH 50: CPT

## 2025-05-25 PROCEDURE — 99222 1ST HOSP IP/OBS MODERATE 55: CPT

## 2025-05-25 RX ORDER — BENZONATATE 100 MG
100 CAPSULE ORAL THREE TIMES A DAY
Refills: 0 | Status: DISCONTINUED | OUTPATIENT
Start: 2025-05-25 | End: 2025-05-27

## 2025-05-25 RX ADMIN — ENOXAPARIN SODIUM 40 MILLIGRAM(S): 100 INJECTION SUBCUTANEOUS at 23:05

## 2025-05-25 RX ADMIN — Medication 100 MILLIGRAM(S): at 15:49

## 2025-05-25 RX ADMIN — DEXTROMETHORPHAN HBR, GUAIFENESIN 100 MILLIGRAM(S): 200 LIQUID ORAL at 11:58

## 2025-05-25 RX ADMIN — CEFTRIAXONE 100 MILLIGRAM(S): 500 INJECTION, POWDER, FOR SOLUTION INTRAMUSCULAR; INTRAVENOUS at 23:05

## 2025-05-25 RX ADMIN — Medication 2 PUFF(S): at 07:28

## 2025-05-25 RX ADMIN — ENOXAPARIN SODIUM 40 MILLIGRAM(S): 100 INJECTION SUBCUTANEOUS at 00:08

## 2025-05-25 RX ADMIN — MONTELUKAST SODIUM 10 MILLIGRAM(S): 10 TABLET ORAL at 11:59

## 2025-05-25 RX ADMIN — Medication 40 MILLIGRAM(S): at 06:21

## 2025-05-25 RX ADMIN — CEFTRIAXONE 100 MILLIGRAM(S): 500 INJECTION, POWDER, FOR SOLUTION INTRAMUSCULAR; INTRAVENOUS at 00:08

## 2025-05-25 RX ADMIN — DILTIAZEM HYDROCHLORIDE 120 MILLIGRAM(S): 240 TABLET, EXTENDED RELEASE ORAL at 06:22

## 2025-05-25 NOTE — DIETITIAN INITIAL EVALUATION ADULT - PROBLEM SELECTOR PLAN 4
Heme/Onc consulted for known intrahepatic cholangiocarcinoma. No plans for inpatient chemotherapy at this time  Patient has follow up with his oncologist, Dr. Irvin Hancock on 5/27/25.

## 2025-05-25 NOTE — DIETITIAN INITIAL EVALUATION ADULT - NSICDXPASTMEDICALHX_GEN_ALL_CORE_FT
DATE OF INITIAL PHYSICAL THERAPY EVALUATION:              REFERRING PROVIDER:       LUIGI Krause Dr.      REFERRING DIAGNOSIS:       Chronic pain syndrome [G89.4]  Neuralgia and neuritis [M79.2]  Mononeuritis of left lower extremity [G57.92]  Complex regional pain syndrome type 2 of left lower extremity [G57.72]      PRECAUTIONS:  Patient has an implanted spinal pain stimulator; muscle stimulation is contraindicated.    VISIT    #    SUBJECTIVE:  I has significant pian reduction in R hip after last session but after performing gluteal sets several days ago, I developed hip pain again    Pain Ratin/10 R lateral hip      Patient reports the following functional activity limitations:  Long distance ambulation and prolonged standing are impaired due to hip girdle pain and back pain.  Lifting and carrying, some ADL's impaired due to chronic myofascial back pain.      OBJECTIVE:      TREATMENT PROVIDED:   60 min  -moist heat applied to the right hip girdle and lumbar spine musculature 10 min  -Manual therapy : 40 min: neural flossing on RLE to sciatic nerve, external massage to obturator internus and piriformis and internal MFR to obturator nad levator ani mm  -therex: diaphragmatic breathing for pelvic relaxation, 10 min  -pt educated and demo proper body mechanics for getting in and out son's W/C in back of car 10 min    ASSESSMENT: Pt progress slow but steady regarding hip pain. Gluteal exercises ceased today due to pain    PLAN:  Pt to be seen 2x week for 2-3 weeks for core strengthening and Manual to reduce pelvic and lumbar pain                                                    Long  TERM GOALS:    1. Pt to report sitting for greater than 30 minutes to 1 hour without pain partially met  2. Pt report able to walk 1 mile with minimal to no hip and LBP Partially met   3. Pt to report ability to play on ground with grandsons for 30 minutes without LBP  4. Pt to report able to get in and  out of tub with minimal difficulty    These services are reasonable and necessary for the conditions set forth above while under my care.        PAST MEDICAL HISTORY:  Asthma     Colonic polyp     Dilated aortic root     History of aortic aneurysm     Intrahepatic bile duct carcinoma     Lung nodule     Malignant neoplasm of prostate     Migraine     Reflux esophagitis

## 2025-05-25 NOTE — PROGRESS NOTE ADULT - SUBJECTIVE AND OBJECTIVE BOX
INTERVAL EVENTS: No o/n events. Denies CP, dyspnea, palpitations, presyncope, syncope, f/c/n/v.     REVIEW OF SYSTEMS:  Constitutional:     [X] negative [ ] fevers [ ] chills [ ] weight loss [ ] weight gain  HEENT:                  [X] negative [ ] dry eyes [ ] eye irritation [ ] postnasal drip [ ] nasal congestion  CV:                         [X] negative  [ ] chest pain [ ] orthopnea [ ] palpitations [ ] murmur  Resp:                     [X] negative [ ] cough [ ] shortness of breath [ ] wheezing [ ] sputum [ ] hemoptysis  GI:                          [X] negative [ ] nausea [ ] vomiting [ ] diarrhea [ ] constipation [ ] abd pain [ ] dysphagia   :                        [X] negative [ ] dysuria [ ] nocturia [ ] hematuria [ ] increased urinary frequency  MSK:                      [X] negative [ ] back pain [ ] myalgias [ ] arthralgias [ ] fracture  Skin:                       [X] negative [ ] rash [ ] itch  Neuro:                   [X] negative [ ] headache [ ] dizziness [ ] syncope [ ] weakness [ ] numbness  Psych:                    [X] negative [ ] anxiety [ ] depression  Endo:                     [X] negative [ ] diabetes [ ] thyroid problem  Heme/Lymph:      [X] negative [ ] anemia [ ] bleeding problem  Allergic/Immune: [X] negative [ ] itchy eyes [ ] nasal discharge [ ] hives [ ] angioedema    [X] All other systems negative or otherwise described above.  [ ] Unable to assess ROS due to ________.    PAST MEDICAL & SURGICAL HISTORY:  Lung nodule    Reflux esophagitis    Colonic polyp    Malignant neoplasm of prostate    Cancer of the liver and intrahepatic bile ducts    Intrahepatic bile duct carcinoma    History of aortic aneurysm    Dilated aortic root    Asthma    Migraine    History of chemotherapy    History of radiation therapy    H/O colonoscopy    H/O endoscopy    History of liver biopsy    History of lung biopsy    History of vascular access device      MEDICATIONS  (STANDING):  cefTRIAXone   IVPB 2000 milliGRAM(s) IV Intermittent every 24 hours  diltiazem    Tablet 120 milliGRAM(s) Oral daily  enoxaparin Injectable 40 milliGRAM(s) SubCutaneous every 24 hours  montelukast 10 milliGRAM(s) Oral daily  pantoprazole    Tablet 40 milliGRAM(s) Oral before breakfast  posaconazole DR Tablet 300 milliGRAM(s) Oral daily    MEDICATIONS  (PRN):  albuterol    90 MICROgram(s) HFA Inhaler 2 Puff(s) Inhalation every 6 hours PRN Shortness of Breath and/or Wheezing  benzonatate 100 milliGRAM(s) Oral three times a day PRN Cough  guaiFENesin Oral Liquid (Sugar-Free) 100 milliGRAM(s) Oral every 6 hours PRN Cough  melatonin 3 milliGRAM(s) Oral at bedtime PRN Insomnia    ICU Vital Signs Last 24 Hrs  T(C): 36.9 (25 May 2025 12:06), Max: 37.6 (24 May 2025 18:53)  T(F): 98.5 (25 May 2025 12:06), Max: 99.6 (24 May 2025 18:53)  HR: 78 (25 May 2025 12:06) (78 - 104)  BP: 129/83 (25 May 2025 12:06) (122/80 - 150/75)  BP(mean): 94 (25 May 2025 06:09) (94 - 94)  ABP: --  ABP(mean): --  RR: 19 (25 May 2025 12:06) (16 - 19)  SpO2: 98% (25 May 2025 12:06) (93% - 98%)    O2 Parameters below as of 25 May 2025 12:06  Patient On (Oxygen Delivery Method): room air          Orthostatic VS    Daily     Daily   I&O's Summary      PHYSICAL EXAM:  Constitutional: resting comfortably in bed; NAD  Head: NC/AT  Eyes: PERRL, EOMI, anicteric sclera  ENT: no nasal discharge; uvula midline, no oropharyngeal erythema or exudates; MMM  Neck: supple; no JVD or thyromegaly  Chest: R chemo port   Respiratory: Rales L side, no W/R, no retractions  Cardiac: +S1/S2; RRR; no M/R/G; PMI non-displaced  Gastrointestinal: abdomen soft, NT/ND; no rebound or guarding; +BSx4, +HAIP  Back: spine midline, no bony tenderness or step-offs; no CVAT B/L  Extremities: WWP, no clubbing or cyanosis; no peripheral edema  Vascular: 2+ radial, DP/PT pulses B/L  Dermatologic: skin warm, dry and intact; no rashes, wounds, or scars  Neurologic: AAOx3; CNII-XII grossly intact; no focal deficits    LABS:                        11.3   26.79 )-----------( 305      ( 25 May 2025 07:30 )             33.4       05-25    126[L]  |  92[L]  |  14  ----------------------------<  114[H]  4.6   |  21[L]  |  0.85    Ca    8.9      25 May 2025 07:30  Phos  4.0     05-25  Mg     2.0     05-25    TPro  6.5  /  Alb  2.8[L]  /  TBili  0.9  /  DBili  x   /  AST  31  /  ALT  99[H]  /  AlkPhos  553[H]  05-25          Urinalysis Basic - ( 25 May 2025 07:30 )    Color: x / Appearance: x / SG: x / pH: x  Gluc: 114 mg/dL / Ketone: x  / Bili: x / Urobili: x   Blood: x / Protein: x / Nitrite: x   Leuk Esterase: x / RBC: x / WBC x   Sq Epi: x / Non Sq Epi: x / Bacteria: x        Culture - Sputum (collected 25 May 2025 00:26)  Source: Sputum Sputum  Gram Stain (25 May 2025 12:31):    Moderate polymorphonuclear leukocytes seen per low power field    Moderate Squamous epithelial cells seen per low power field    Numerous Gram Positive Rods seen per oil power field    Numerous Yeast like cells seen per oil power field    Numerous Gram positive cocci in pairs seen per oil power field    Few Gram Positive Cocci in Pairs and Chains seen per oil power field    Urinalysis with Rflx Culture (collected 24 May 2025 11:46)        RADIOLOGY & ADDITIONAL STUDIES: Reviewed

## 2025-05-25 NOTE — DIETITIAN INITIAL EVALUATION ADULT - PROBLEM SELECTOR PLAN 2
Na 125 on admission. Patient a&o x 4. Mentating well. Asymptomatic.   Patient received 3 L NS prior to collection of Ur studies which suggest hypotonic hyponatremia. appears euvolemic on exam, and sodium unchanged s/p fluids. ?SIADH   - Monitor BMP q6h w/ repeat urine lytes  - start Fluid restrict 1.5 L   - repeat BMP 10pm

## 2025-05-25 NOTE — PROGRESS NOTE ADULT - PROBLEM SELECTOR PLAN 3
, AST 51,  on outpatient labs 5/23/25.   , AST hemolyzed,  on admission.  Likely iso Tylenol use (3 g per day x 12 days)  - Monitor LFTs QD for now

## 2025-05-25 NOTE — DIETITIAN INITIAL EVALUATION ADULT - PROBLEM SELECTOR PLAN 1
Pt does not meets criteria for SIRS/Sepsis (1/4 for leukocytosis), however has PNA as source of infection on imaging.   Of note fevers may have been masked by Tylenol use at home.   Also found to have lactic acidosis; now resolved s/p 3 L NS in the ED.   Urine strep and legionella negative, full RVP negative.   Given 2 wks of symptoms without improvement, likely viral PNA with superimposed bacterial PNA, favor covering for CAP in immunocompromised patient.   - Continue with CTX 2 g   - f/u MRSA swab  - f/u sputum cultures   - f/u blood cultures

## 2025-05-25 NOTE — DIETITIAN NUTRITION RISK NOTIFICATION - ADDITIONAL COMMENTS/DIETITIAN RECOMMENDATIONS
Moderate muscle and fat wasting identified. Per ASPEN guidelines, pt meets criteria for moderate malnutrition.

## 2025-05-25 NOTE — DIETITIAN INITIAL EVALUATION ADULT - ADD RECOMMEND
1. Recommend liberalized diet to promote intake as K now WNL, defer fluids to team.   >>Encourage and monitor PO intake. [place] dietary preferences as able.   2. Monitor GI tolerance, weight trends, labs, and skin integrity.  3. Defer bowel and pain regimens to team.   4. RD to remain available for diet education/intervention prn.

## 2025-05-25 NOTE — PROGRESS NOTE ADULT - PROBLEM SELECTOR PLAN 1
Pt does not meets criteria for SIRS/Sepsis (1/4 for leukocytosis), however has PNA as source of infection on imaging.   Of note fevers may have been masked by Tylenol use at home.   Also found to have lactic acidosis; now resolved s/p 3 L NS in the ED.   Urine strep and legionella negative, full RVP negative.   Given 2 wks of symptoms without improvement, likely viral PNA with superimposed bacterial PNA, favor covering for CAP in immunocompromised patient.   - Continue with CTX 2 g   - f/u MRSA swab  - f/u sputum cultures; GPR and GPC present   - f/u blood cultures

## 2025-05-25 NOTE — DIETITIAN INITIAL EVALUATION ADULT - NUTRITIONGOAL OUTCOME1
Pt to consistently meet >75% nutrient needs.  Pt to consistently meet >75% nutrient needs. Reduce signs and symptoms of protein-calorie malnutrition.

## 2025-05-25 NOTE — DIETITIAN INITIAL EVALUATION ADULT - PERTINENT MEDS FT
MEDICATIONS  (STANDING):  cefTRIAXone   IVPB 2000 milliGRAM(s) IV Intermittent every 24 hours  diltiazem    Tablet 120 milliGRAM(s) Oral daily  enoxaparin Injectable 40 milliGRAM(s) SubCutaneous every 24 hours  montelukast 10 milliGRAM(s) Oral daily  pantoprazole    Tablet 40 milliGRAM(s) Oral before breakfast  posaconazole DR Tablet 300 milliGRAM(s) Oral daily    MEDICATIONS  (PRN):  albuterol    90 MICROgram(s) HFA Inhaler 2 Puff(s) Inhalation every 6 hours PRN Shortness of Breath and/or Wheezing  guaiFENesin Oral Liquid (Sugar-Free) 100 milliGRAM(s) Oral every 6 hours PRN Cough  melatonin 3 milliGRAM(s) Oral at bedtime PRN Insomnia

## 2025-05-25 NOTE — DIETITIAN INITIAL EVALUATION ADULT - PERSON TAUGHT/METHOD
Educated on importance of increased kcal/protein intake in setting of elevated nutrient demands and strategies to maximize intake. Emphasized protein foods, encouraged frequent meals/snacking. Educated on therapeutic diet order per MD. RD answered all questions. Pt aware RD remains available for additional questions/concerns./verbal instruction/patient instructed

## 2025-05-25 NOTE — DIETITIAN NUTRITION RISK NOTIFICATION - FINDINGS BASED ON COMPREHENSIVE NUTRITION ASSESSMENT, CONSULTATION PERFORMED ON
· [facility] 25-May-2025

## 2025-05-25 NOTE — DIETITIAN INITIAL EVALUATION ADULT - PERTINENT LABORATORY DATA
05-25    126[L]  |  92[L]  |  14  ----------------------------<  114[H]  4.6   |  21[L]  |  0.85    Ca    8.9      25 May 2025 07:30  Phos  4.0     05-25  Mg     2.0     05-25    TPro  6.5  /  Alb  2.8[L]  /  TBili  0.9  /  DBili  x   /  AST  31  /  ALT  99[H]  /  AlkPhos  553[H]  05-25  A1C with Estimated Average Glucose Result: 5.5 % (03-11-25 @ 15:10)

## 2025-05-25 NOTE — DIETITIAN INITIAL EVALUATION ADULT - OTHER CALCULATIONS
Estimated needs based on IBW as dosing wt 130 pounds / 59 kilograms <80% IBW (73%). Needs adjusted for age, BMI, cancer, and malnutrition.   Defer fluids to team in setting of hyponatremia.

## 2025-05-25 NOTE — DIETITIAN INITIAL EVALUATION ADULT - NS FNS DIET ORDER
No
Diet, Regular:   1500mL Fluid Restriction ([id number])  No Concentrated Potassium (05-24-25 @ 19:34)

## 2025-05-25 NOTE — DIETITIAN INITIAL EVALUATION ADULT - PROBLEM SELECTOR PLAN 3
, AST 51,  on outpatient labs 5/23/25.   , AST hemolyzed,  on admission.  Likely iso Tylenol use (3 g per day x 12 days)  - f/u Acetaminophen level  - Monitor LFTs QD for now

## 2025-05-25 NOTE — DIETITIAN INITIAL EVALUATION ADULT - OTHER INFO
69M PMHx of cholangiocarcinoma status post chemo (right chest chemo port), ex lap, hepatic artery infusion pump (HAIP), celiac LN Bx and girish on 4/2/25, invasive aspergillosis status post 4 months of Posaconazole 3/23, ocular toxoplasmosis, prostate cancer status post radiation in 2014, HTN, HLD, aortic aneurysm, colonic polyps, asthma (mild intermittent), and GERD with reflux esophagitis presenting for multilobar PNA on outpatient imaging, admitted for management.     Pt seen on 7WO for assessment. Labs and medication orders reviewed. Na 126 <low>, K/Mg/Phos WNL, BUN/Cr WNL. On No Concentrated Potassium diet with 1.5L fluid restriction. Pt sitting up in bed having breakfast on visit this AM. Reports good appetite and intake, denies issues with food/meal procurement PTA. States UBW ~155 pounds over unknown time frame, unsure of wt changes; wt history per [facility]: (1/13/25) 151 pounds, (2/18) 158 pounds, (3/10) 160 pounds, (4/14) 149 pounds; ?admission wt (scale vs stated) 130 pounds / 59 kilograms - recommend nursing to obtain current scale wt to verify. Moderate muscle and fat wasting identified. Per ASPEN guidelines, pt meets criteria for moderate malnutrition - see nutrition risk notification. Pt denies difficulty chewing/swallowing. Reports no nausea/vomiting/diarrhea/constipation, last BM 5/24. No abdominal distension/discomfort noted, no pain reported. Pt confirms allergies to nuts, peanuts, sesame, avocado, and citrus - documented in chart. No Christian/ethnic/cultural food preferences noted. No pressure injuries or edema documented, Lincoln score 20. See nutrition recommendations. RD to remain available.  69M PMHx of cholangiocarcinoma status post chemo (right chest chemo port), ex lap, hepatic artery infusion pump (HAIP), celiac LN Bx and girish on 4/2/25, invasive aspergillosis status post 4 months of Posaconazole 3/23, ocular toxoplasmosis, prostate cancer status post radiation in 2014, HTN, HLD, aortic aneurysm, colonic polyps, asthma (mild intermittent), and GERD with reflux esophagitis presenting for multilobar PNA on outpatient imaging, admitted for management.     Pt seen on 7WO for assessment. Labs and medication orders reviewed. Na 126 <low>, K/Mg/Phos WNL, BUN/Cr WNL. On No Concentrated Potassium diet with 1.5L fluid restriction. Pt sitting up in bed having breakfast on visit this AM. Reports good appetite and intake, denies issues with food/meal procurement PTA. States UBW ~155 pounds over unknown time frame, unsure of wt changes; wt history per [facility]: (1/13/25) 151 pounds, (2/18) 158 pounds, (3/10) 160 pounds, (4/14) 149 pounds; ?admission wt (scale vs stated) 130 pounds / 59 kilograms - recommend nursing to obtain current scale wt to verify. Moderate muscle and fat wasting identified. Per ASPEN guidelines, pt meets criteria for moderate malnutrition - see nutrition risk notification. Pt denies difficulty chewing/swallowing. Reports no nausea/vomiting/diarrhea/constipation, last BM 5/24. No abdominal distension/discomfort noted, no pain reported. Pt confirms allergies to nuts, peanuts, sesame, avocado, and citrus - documented in chart. No Rastafarian/ethnic/cultural food preferences noted. No pressure injuries or edema documented, Lincoln score 20. See nutrition recommendations - RD communicated to team. RD to remain available.

## 2025-05-25 NOTE — PROGRESS NOTE ADULT - PROBLEM SELECTOR PLAN 6
Aspergillosis diagnosed in the context of cavitating pulmonary lesions, treated with  4 months of Posaconazole in 3/23 by Dr. Bland at [place]. Patient currently takes Posaconazole 300 mg QD for ppx of invasive fungal infections  - C/w with Posaconazole 300 mg QD

## 2025-05-25 NOTE — PROGRESS NOTE ADULT - SUBJECTIVE AND OBJECTIVE BOX
Patient is a 69y old  Male who presents with a chief complaint of Multilobar PNA (25 May 2025 12:50)    INTERVAL HISTORY: Notes subjective improvement in breathing. Still endorsing fatigue and persistent dry cough. Leukocytosis downtrending. ALP and Na noted.    REVIEW OF SYSTEMS:  All other review of systems is negative unless indicated above.    OBJECTIVE:  T(C): 36.9 (05-25-25 @ 12:06), Max: 37.6 (05-24-25 @ 18:53)  HR: 78 (05-25-25 @ 12:06) (78 - 104)  BP: 129/83 (05-25-25 @ 12:06) (122/80 - 145/83)  RR: 19 (05-25-25 @ 12:06) (17 - 19)  SpO2: 98% (05-25-25 @ 12:06) (93% - 98%)  Daily     Daily     Physical Exam:  General: in no acute distress, non-toxic appearing  Eyes: EOMI intact bilaterally. Anicteric sclerae, moist conjunctivae  HENT: Moist mucous membranes  Neck: Trachea midline, supple  Lungs: Normal respiratory effort. No accessory muscle use  Neurological: Alert and appropriately conversant  Skin: Warm and dry. No obvious rash     Medications:  MEDICATIONS  (STANDING):  cefTRIAXone   IVPB 2000 milliGRAM(s) IV Intermittent every 24 hours  diltiazem    Tablet 120 milliGRAM(s) Oral daily  enoxaparin Injectable 40 milliGRAM(s) SubCutaneous every 24 hours  montelukast 10 milliGRAM(s) Oral daily  pantoprazole    Tablet 40 milliGRAM(s) Oral before breakfast  posaconazole DR Tablet 300 milliGRAM(s) Oral daily    MEDICATIONS  (PRN):  albuterol    90 MICROgram(s) HFA Inhaler 2 Puff(s) Inhalation every 6 hours PRN Shortness of Breath and/or Wheezing  benzonatate 100 milliGRAM(s) Oral three times a day PRN Cough  guaiFENesin Oral Liquid (Sugar-Free) 100 milliGRAM(s) Oral every 6 hours PRN Cough  melatonin 3 milliGRAM(s) Oral at bedtime PRN Insomnia      Labs:                        11.3   26.79 )-----------( 305      ( 25 May 2025 07:30 )             33.4     05-25    126[L]  |  92[L]  |  14  ----------------------------<  114[H]  4.6   |  21[L]  |  0.85    Ca    8.9      25 May 2025 07:30  Phos  4.0     05-25  Mg     2.0     05-25    TPro  6.5  /  Alb  2.8[L]  /  TBili  0.9  /  DBili  x   /  AST  31  /  ALT  99[H]  /  AlkPhos  553[H]  05-25    Urinalysis Basic - ( 25 May 2025 07:30 )    Color: x / Appearance: x / SG: x / pH: x  Gluc: 114 mg/dL / Ketone: x  / Bili: x / Urobili: x   Blood: x / Protein: x / Nitrite: x   Leuk Esterase: x / RBC: x / WBC x   Sq Epi: x / Non Sq Epi: x / Bacteria: x    SARS-CoV-2: NotDetec (24 May 2025 11:20)    Radiology: Reviewed

## 2025-05-25 NOTE — DIETITIAN INITIAL EVALUATION ADULT - PROBLEM SELECTOR PLAN 6
Aspergillosis diagnosed in the context of cavitating pulmonary lesions, treated with  4 months of Posaconazole in 3/23 by Dr. Bland at [place]. Patient currently takes Posaconazole 300 mg QD for ppx of invasive fungal infections  - C/w with Posaconazole 300 mg QD

## 2025-05-25 NOTE — DIETITIAN INITIAL EVALUATION ADULT - PROBLEM SELECTOR PLAN 9
Patient is on Diltiazem 120 mg ER QD for "premature heart beats".   Confirmed in dispense hx and HIE.   Possibly iso ventricular PVCs vs SVT. However EKG today with normal rate and sinus rhythm.   - Obtain collateral   - C/w home Diltiazem 120 mg ER QD

## 2025-05-26 LAB
ALBUMIN SERPL ELPH-MCNC: 2.7 G/DL
ALBUMIN SERPL ELPH-MCNC: 2.7 G/DL — LOW (ref 3.3–5)
ALP BLD-CCNC: 453 U/L
ALP SERPL-CCNC: 483 U/L — HIGH (ref 40–120)
ALT FLD-CCNC: 78 U/L — HIGH (ref 10–45)
ALT SERPL-CCNC: 132 U/L
ANION GAP SERPL CALC-SCNC: 11 MMOL/L — SIGNIFICANT CHANGE UP (ref 5–17)
ANION GAP SERPL CALC-SCNC: 12 MMOL/L — SIGNIFICANT CHANGE UP (ref 5–17)
ANION GAP SERPL CALC-SCNC: 13 MMOL/L
APPEARANCE UR: CLEAR — SIGNIFICANT CHANGE UP
AST SERPL-CCNC: 25 U/L — SIGNIFICANT CHANGE UP (ref 10–40)
AST SERPL-CCNC: 51 U/L
BASOPHILS # BLD AUTO: 0 K/UL — SIGNIFICANT CHANGE UP (ref 0–0.2)
BASOPHILS NFR BLD AUTO: 0 % — SIGNIFICANT CHANGE UP (ref 0–2)
BILIRUB SERPL-MCNC: 0.6 MG/DL — SIGNIFICANT CHANGE UP (ref 0.2–1.2)
BILIRUB SERPL-MCNC: 1.1 MG/DL
BILIRUB UR-MCNC: NEGATIVE — SIGNIFICANT CHANGE UP
BUN SERPL-MCNC: 14 MG/DL — SIGNIFICANT CHANGE UP (ref 7–23)
BUN SERPL-MCNC: 16 MG/DL
BUN SERPL-MCNC: 18 MG/DL — SIGNIFICANT CHANGE UP (ref 7–23)
CALCIUM SERPL-MCNC: 8.4 MG/DL — SIGNIFICANT CHANGE UP (ref 8.4–10.5)
CALCIUM SERPL-MCNC: 8.6 MG/DL
CALCIUM SERPL-MCNC: 8.7 MG/DL — SIGNIFICANT CHANGE UP (ref 8.4–10.5)
CHLORIDE SERPL-SCNC: 91 MMOL/L — LOW (ref 96–108)
CHLORIDE SERPL-SCNC: 91 MMOL/L — LOW (ref 96–108)
CHLORIDE SERPL-SCNC: 93 MMOL/L
CO2 SERPL-SCNC: 22 MMOL/L — SIGNIFICANT CHANGE UP (ref 22–31)
CO2 SERPL-SCNC: 22 MMOL/L — SIGNIFICANT CHANGE UP (ref 22–31)
CO2 SERPL-SCNC: 26 MMOL/L
COLOR SPEC: YELLOW — SIGNIFICANT CHANGE UP
CREAT ?TM UR-MCNC: 108 MG/DL — SIGNIFICANT CHANGE UP
CREAT SERPL-MCNC: 0.83 MG/DL — SIGNIFICANT CHANGE UP (ref 0.5–1.3)
CREAT SERPL-MCNC: 0.86 MG/DL — SIGNIFICANT CHANGE UP (ref 0.5–1.3)
CREAT SERPL-MCNC: 0.9 MG/DL
CULTURE RESULTS: ABNORMAL
DIFF PNL FLD: NEGATIVE — SIGNIFICANT CHANGE UP
EGFR: 95 ML/MIN/1.73M2 — SIGNIFICANT CHANGE UP
EGFR: 95 ML/MIN/1.73M2 — SIGNIFICANT CHANGE UP
EGFR: 96 ML/MIN/1.73M2 — SIGNIFICANT CHANGE UP
EGFR: 96 ML/MIN/1.73M2 — SIGNIFICANT CHANGE UP
EGFRCR SERPLBLD CKD-EPI 2021: 94 ML/MIN/1.73M2
EOSINOPHIL # BLD AUTO: 0.17 K/UL — SIGNIFICANT CHANGE UP (ref 0–0.5)
EOSINOPHIL NFR BLD AUTO: 0.9 % — SIGNIFICANT CHANGE UP (ref 0–6)
GLUCOSE SERPL-MCNC: 114 MG/DL — HIGH (ref 70–99)
GLUCOSE SERPL-MCNC: 127 MG/DL
GLUCOSE SERPL-MCNC: 130 MG/DL — HIGH (ref 70–99)
GLUCOSE UR QL: NEGATIVE MG/DL — SIGNIFICANT CHANGE UP
HCT VFR BLD CALC: 32.7 % — LOW (ref 39–50)
HCT VFR BLD CALC: 33.9 %
HGB BLD-MCNC: 11.3 G/DL — LOW (ref 13–17)
HGB BLD-MCNC: 11.9 G/DL
KETONES UR QL: NEGATIVE MG/DL — SIGNIFICANT CHANGE UP
LEUKOCYTE ESTERASE UR-ACNC: NEGATIVE — SIGNIFICANT CHANGE UP
LYMPHOCYTES # BLD AUTO: 0.6 K/UL
LYMPHOCYTES # BLD AUTO: 1.14 K/UL — SIGNIFICANT CHANGE UP (ref 1–3.3)
LYMPHOCYTES # BLD AUTO: 6.1 % — LOW (ref 13–44)
LYMPHOCYTES NFR BLD AUTO: 1.9 %
MAGNESIUM SERPL-MCNC: 2.1 MG/DL — SIGNIFICANT CHANGE UP (ref 1.6–2.6)
MAGNESIUM SERPL-MCNC: 2.2 MG/DL
MAN DIFF?: NO
MCHC RBC-ENTMCNC: 33 PG — SIGNIFICANT CHANGE UP (ref 27–34)
MCHC RBC-ENTMCNC: 33.2 PG
MCHC RBC-ENTMCNC: 34.6 G/DL — SIGNIFICANT CHANGE UP (ref 32–36)
MCHC RBC-ENTMCNC: 35.1 G/DL
MCV RBC AUTO: 94.7 FL
MCV RBC AUTO: 95.6 FL — SIGNIFICANT CHANGE UP (ref 80–100)
MONOCYTES # BLD AUTO: 0.97 K/UL — HIGH (ref 0–0.9)
MONOCYTES NFR BLD AUTO: 5.2 % — SIGNIFICANT CHANGE UP (ref 2–14)
NEUTROPHILS # BLD AUTO: 16.2 K/UL — HIGH (ref 1.8–7.4)
NEUTROPHILS # BLD AUTO: 30.4 K/UL
NEUTROPHILS NFR BLD AUTO: 86.9 % — HIGH (ref 43–77)
NEUTROPHILS NFR BLD AUTO: 94.9 %
NITRITE UR-MCNC: NEGATIVE — SIGNIFICANT CHANGE UP
OSMOLALITY UR: 738 MOSM/KG — SIGNIFICANT CHANGE UP (ref 300–900)
PH UR: 5.5 — SIGNIFICANT CHANGE UP (ref 5–8)
PHOSPHATE SERPL-MCNC: 3.4 MG/DL
PHOSPHATE SERPL-MCNC: 4 MG/DL — SIGNIFICANT CHANGE UP (ref 2.5–4.5)
PLATELET # BLD AUTO: 309 K/UL — SIGNIFICANT CHANGE UP (ref 150–400)
PLATELET # BLD AUTO: 322 K/UL
POTASSIUM SERPL-MCNC: 4.5 MMOL/L — SIGNIFICANT CHANGE UP (ref 3.5–5.3)
POTASSIUM SERPL-MCNC: 4.8 MMOL/L — SIGNIFICANT CHANGE UP (ref 3.5–5.3)
POTASSIUM SERPL-SCNC: 4.5 MMOL/L — SIGNIFICANT CHANGE UP (ref 3.5–5.3)
POTASSIUM SERPL-SCNC: 4.7 MMOL/L
POTASSIUM SERPL-SCNC: 4.8 MMOL/L — SIGNIFICANT CHANGE UP (ref 3.5–5.3)
POTASSIUM UR-SCNC: 46 MMOL/L — SIGNIFICANT CHANGE UP
PROT ?TM UR-MCNC: 10 MG/DL — SIGNIFICANT CHANGE UP (ref 0–12)
PROT SERPL-MCNC: 6.4 G/DL — SIGNIFICANT CHANGE UP (ref 6–8.3)
PROT SERPL-MCNC: 7 G/DL
PROT UR-MCNC: NEGATIVE MG/DL — SIGNIFICANT CHANGE UP
PROT/CREAT UR-RTO: 0.1 RATIO — SIGNIFICANT CHANGE UP (ref 0–0.2)
RBC # BLD: 3.42 M/UL — LOW (ref 4.2–5.8)
RBC # BLD: 3.58 M/UL
RBC # FLD: 13.6 %
RBC # FLD: 14 % — SIGNIFICANT CHANGE UP (ref 10.3–14.5)
SODIUM SERPL-SCNC: 124 MMOL/L — LOW (ref 135–145)
SODIUM SERPL-SCNC: 125 MMOL/L — LOW (ref 135–145)
SODIUM SERPL-SCNC: 132 MMOL/L
SODIUM UR-SCNC: 60 MMOL/L — SIGNIFICANT CHANGE UP
SP GR SPEC: 1.02 — SIGNIFICANT CHANGE UP (ref 1–1.03)
SPECIMEN SOURCE: SIGNIFICANT CHANGE UP
UROBILINOGEN FLD QL: 0.2 MG/DL — SIGNIFICANT CHANGE UP (ref 0.2–1)
UUN UR-MCNC: 1259 MG/DL — SIGNIFICANT CHANGE UP
WBC # BLD: 18.64 K/UL — HIGH (ref 3.8–10.5)
WBC # FLD AUTO: 18.64 K/UL — HIGH (ref 3.8–10.5)
WBC # FLD AUTO: 32 K/UL

## 2025-05-26 PROCEDURE — 99233 SBSQ HOSP IP/OBS HIGH 50: CPT

## 2025-05-26 RX ORDER — CALCIUM CARBONATE 750 MG/1
1 TABLET ORAL ONCE
Refills: 0 | Status: COMPLETED | OUTPATIENT
Start: 2025-05-26 | End: 2025-05-26

## 2025-05-26 RX ADMIN — Medication 2 PUFF(S): at 00:08

## 2025-05-26 RX ADMIN — Medication 100 MILLIGRAM(S): at 13:29

## 2025-05-26 RX ADMIN — DILTIAZEM HYDROCHLORIDE 120 MILLIGRAM(S): 240 TABLET, EXTENDED RELEASE ORAL at 06:35

## 2025-05-26 RX ADMIN — DEXTROMETHORPHAN HBR, GUAIFENESIN 100 MILLIGRAM(S): 200 LIQUID ORAL at 16:34

## 2025-05-26 RX ADMIN — DEXTROMETHORPHAN HBR, GUAIFENESIN 100 MILLIGRAM(S): 200 LIQUID ORAL at 22:33

## 2025-05-26 RX ADMIN — CALCIUM CARBONATE 1 TABLET(S): 750 TABLET ORAL at 03:09

## 2025-05-26 RX ADMIN — Medication 40 MILLIGRAM(S): at 06:36

## 2025-05-26 RX ADMIN — Medication 1 GRAM(S): at 13:18

## 2025-05-26 RX ADMIN — Medication 2 PUFF(S): at 10:24

## 2025-05-26 RX ADMIN — DEXTROMETHORPHAN HBR, GUAIFENESIN 100 MILLIGRAM(S): 200 LIQUID ORAL at 10:22

## 2025-05-26 RX ADMIN — MONTELUKAST SODIUM 10 MILLIGRAM(S): 10 TABLET ORAL at 12:16

## 2025-05-26 RX ADMIN — POSACONAZOLE 300 MILLIGRAM(S): 100 TABLET, DELAYED RELEASE ORAL at 18:23

## 2025-05-26 NOTE — PROGRESS NOTE ADULT - PROBLEM SELECTOR PLAN 1
Pt does not meets criteria for SIRS/Sepsis (1/4 for leukocytosis), however has PNA as source of infection on imaging.   Of note fevers may have been masked by Tylenol use at home.   Also found to have lactic acidosis; now resolved s/p 3 L NS in the ED.   Urine strep and legionella negative, full RVP negative.   Given 2 wks of symptoms without improvement, likely viral PNA with superimposed bacterial PNA, favor covering for CAP in immunocompromised patient.   - Continue with CTX 2 g   - C/w Posaconazole 300mg qd, ID approval obtained 5/26   - f/u MRSA swab  - f/u sputum cultures; GPR and GPC present   - f/u blood cultures

## 2025-05-26 NOTE — PROGRESS NOTE ADULT - PROBLEM SELECTOR PLAN 2
Na 125 on admission. Patient a&o x 4. Mentating well. Asymptomatic.   Patient received 3 L NS prior to collection of Ur studies which suggest hypotonic hyponatremia. appears euvolemic on exam, and sodium unchanged s/p fluids. ?SIADH   - Monitor BMP q6h w/ repeat urine lytes  - start Fluid restrict 1.5 L   - Salt tablets 1g BID started 5/26/2025

## 2025-05-26 NOTE — PROGRESS NOTE ADULT - PROBLEM SELECTOR PLAN 6
Aspergillosis diagnosed in the context of cavitating pulmonary lesions, treated with  4 months of Posaconazole in 3/23 by Dr. Bland at [place]. Patient currently takes Posaconazole 300 mg QD for ppx of invasive fungal infections  - C/w with Posaconazole 300 mg QD; ID approval obtained ([place])

## 2025-05-26 NOTE — PROGRESS NOTE ADULT - PROBLEM SELECTOR PLAN 2
Na 125 on admission. Patient a&o x 4. Mentating well. Asymptomatic.   Patient received 3 L NS prior to collection of Ur studies which suggest hypotonic hyponatremia. appears euvolemic on exam, and sodium unchanged s/p fluids. ?SIADH   - Monitor BMP q6h w/ repeat urine lytes  - Fluid restrict 1 L

## 2025-05-26 NOTE — PROGRESS NOTE ADULT - PROBLEM SELECTOR PLAN 1
Pt does not meets criteria for SIRS/Sepsis (1/4 for leukocytosis), however has PNA as source of infection on imaging.   Of note fevers may have been masked by Tylenol use at home.   Also found to have lactic acidosis; now resolved s/p 3 L NS in the ED.   Urine strep and legionella negative, full RVP negative.   Given 2 wks of symptoms without improvement, likely viral PNA with superimposed bacterial PNA, favor covering for CAP in immunocompromised patient.   - Continue with CTX 2 g; leukocytosis improving  - f/u MRSA swab  - f/u sputum cultures; GPR and GPC present; pending speciation and sensitivities   - f/u blood cultures

## 2025-05-26 NOTE — PROGRESS NOTE ADULT - SUBJECTIVE AND OBJECTIVE BOX
INTERVAL EVENTS: No o/n events. Denies CP, dyspnea, palpitations, presyncope, syncope, f/c/n/v.     REVIEW OF SYSTEMS:  Constitutional:     [X] negative [ ] fevers [ ] chills [ ] weight loss [ ] weight gain  HEENT:                  [X] negative [ ] dry eyes [ ] eye irritation [ ] postnasal drip [ ] nasal congestion  CV:                         [X] negative  [ ] chest pain [ ] orthopnea [ ] palpitations [ ] murmur  Resp:                     [X] negative [ ] cough [ ] shortness of breath [ ] wheezing [ ] sputum [ ] hemoptysis  GI:                          [X] negative [ ] nausea [ ] vomiting [ ] diarrhea [ ] constipation [ ] abd pain [ ] dysphagia   :                        [X] negative [ ] dysuria [ ] nocturia [ ] hematuria [ ] increased urinary frequency  MSK:                      [X] negative [ ] back pain [ ] myalgias [ ] arthralgias [ ] fracture  Skin:                       [X] negative [ ] rash [ ] itch  Neuro:                   [X] negative [ ] headache [ ] dizziness [ ] syncope [ ] weakness [ ] numbness  Psych:                    [X] negative [ ] anxiety [ ] depression  Endo:                     [X] negative [ ] diabetes [ ] thyroid problem  Heme/Lymph:      [X] negative [ ] anemia [ ] bleeding problem  Allergic/Immune: [X] negative [ ] itchy eyes [ ] nasal discharge [ ] hives [ ] angioedema    [X] All other systems negative or otherwise described above.  [ ] Unable to assess ROS due to ________.    PAST MEDICAL & SURGICAL HISTORY:  Lung nodule    Reflux esophagitis    Colonic polyp    Malignant neoplasm of prostate    Cancer of the liver and intrahepatic bile ducts    Intrahepatic bile duct carcinoma    History of aortic aneurysm    Dilated aortic root    Asthma    Migraine    History of chemotherapy    History of radiation therapy    H/O colonoscopy    H/O endoscopy    History of liver biopsy    History of lung biopsy    History of vascular access device      MEDICATIONS  (STANDING):  cefTRIAXone   IVPB 2000 milliGRAM(s) IV Intermittent every 24 hours  diltiazem    Tablet 120 milliGRAM(s) Oral daily  enoxaparin Injectable 40 milliGRAM(s) SubCutaneous every 24 hours  montelukast 10 milliGRAM(s) Oral daily  pantoprazole    Tablet 40 milliGRAM(s) Oral before breakfast  posaconazole DR Tablet 300 milliGRAM(s) Oral daily  sodium chloride 1 Gram(s) Oral every 12 hours    MEDICATIONS  (PRN):  albuterol    90 MICROgram(s) HFA Inhaler 2 Puff(s) Inhalation every 6 hours PRN Shortness of Breath and/or Wheezing  benzonatate 100 milliGRAM(s) Oral three times a day PRN Cough  guaiFENesin Oral Liquid (Sugar-Free) 100 milliGRAM(s) Oral every 6 hours PRN Cough  melatonin 3 milliGRAM(s) Oral at bedtime PRN Insomnia    ICU Vital Signs Last 24 Hrs  T(C): 36.5 (26 May 2025 12:22), Max: 37.2 (25 May 2025 20:20)  T(F): 97.7 (26 May 2025 12:22), Max: 98.9 (25 May 2025 20:20)  HR: 81 (26 May 2025 12:22) (81 - 88)  BP: 117/78 (26 May 2025 12:22) (117/78 - 154/88)  BP(mean): 102 (26 May 2025 06:09) (102 - 102)  ABP: --  ABP(mean): --  RR: 16 (26 May 2025 12:22) (16 - 18)  SpO2: 95% (26 May 2025 12:22) (94% - 95%)    O2 Parameters below as of 26 May 2025 12:22  Patient On (Oxygen Delivery Method): room air          Orthostatic VS    Daily     Daily   I&O's Summary      PHYSICAL EXAM:  Constitutional: resting comfortably in bed; NAD  Head: NC/AT  Eyes: PERRL, EOMI, anicteric sclera  ENT: no nasal discharge; uvula midline, no oropharyngeal erythema or exudates; MMM  Neck: supple; no JVD or thyromegaly  Chest: R chemo port   Respiratory: Rales L side, no W/R, no retractions  Cardiac: +S1/S2; RRR; no M/R/G; PMI non-displaced  Gastrointestinal: abdomen soft, NT/ND; no rebound or guarding; +BSx4, +HAIP  Back: spine midline, no bony tenderness or step-offs; no CVAT B/L  Extremities: WWP, no clubbing or cyanosis; no peripheral edema  Vascular: 2+ radial, DP/PT pulses B/L  Dermatologic: skin warm, dry and intact; no rashes, wounds, or scars  Neurologic: AAOx3; CNII-XII grossly intact; no focal deficits      LABS:                        11.3   18.64 )-----------( 309      ( 26 May 2025 05:30 )             32.7       05-26    125[L]  |  91[L]  |  14  ----------------------------<  114[H]  4.8   |  22  |  0.83    Ca    8.7      26 May 2025 05:30  Phos  4.0     05-26  Mg     2.1     05-26    TPro  6.4  /  Alb  2.7[L]  /  TBili  0.6  /  DBili  x   /  AST  25  /  ALT  78[H]  /  AlkPhos  483[H]  05-26          Urinalysis Basic - ( 26 May 2025 05:30 )    Color: x / Appearance: x / SG: x / pH: x  Gluc: 114 mg/dL / Ketone: x  / Bili: x / Urobili: x   Blood: x / Protein: x / Nitrite: x   Leuk Esterase: x / RBC: x / WBC x   Sq Epi: x / Non Sq Epi: x / Bacteria: x        Culture - Sputum (collected 25 May 2025 00:26)  Source: Sputum Sputum  Gram Stain (25 May 2025 12:31):    Moderate polymorphonuclear leukocytes seen per low power field    Moderate Squamous epithelial cells seen per low power field    Numerous Gram Positive Rods seen per oil power field    Numerous Yeast like cells seen per oil power field    Numerous Gram positive cocci in pairs seen per oil power field    Few Gram Positive Cocci in Pairs and Chains seen per oil power field  Preliminary Report (26 May 2025 07:40):    Commensal tam consistent with body site    Urinalysis with Rflx Culture (collected 24 May 2025 11:46)    Culture - Blood (collected 24 May 2025 10:53)  Source: Blood Blood  Preliminary Report (25 May 2025 23:01):    No growth at 24 hours    Culture - Blood (collected 24 May 2025 10:53)  Source: Blood Blood  Preliminary Report (25 May 2025 23:01):    No growth at 24 hours        RADIOLOGY & ADDITIONAL STUDIES: Reviewed

## 2025-05-26 NOTE — PROGRESS NOTE ADULT - TIME BILLING
Review of chart and imaging. Evaluation of the patient. Discussion with the care team members. Documentation.
Review of chart and imaging. Evaluation of the patient. Discussion with the care team members. Documentation.

## 2025-05-26 NOTE — PROGRESS NOTE ADULT - SUBJECTIVE AND OBJECTIVE BOX
CC: Patient is a 69y old  Male who presents with a chief complaint of Multilobar PNA    INTERVAL EVENTS: JENNIFER  SUBJECTIVE / INTERVAL HPI: Patient seen and examined at bedside.   ROS: negative unless otherwise stated above.    VITAL SIGNS:  Vital Signs Last 24 Hrs  T(C): 36.4 (26 May 2025 06:09), Max: 37.2 (25 May 2025 20:20)  T(F): 97.6 (26 May 2025 06:09), Max: 98.9 (25 May 2025 20:20)  HR: 81 (26 May 2025 06:09) (78 - 88)  BP: 133/87 (26 May 2025 06:09) (129/83 - 154/88)  BP(mean): 102 (26 May 2025 06:09) (102 - 102)  RR: 18 (26 May 2025 06:09) (18 - 19)  SpO2: 95% (26 May 2025 06:09) (94% - 98%)    Parameters below as of 26 May 2025 06:09  Patient On (Oxygen Delivery Method): room air            PHYSICAL EXAM:  General: NAD  HEENT: MMM  Neck: supple  Cardiovascular: +S1/S2; RRR  Respiratory: CTA B/L; no W/R/R  Gastrointestinal: soft, NT/ND  Extremities: WWP; no edema, clubbing or cyanosis  Vascular: 2+ radial, DP/PT pulses B/L  Neurological: AAOx3; no focal deficits    MEDICATIONS:  MEDICATIONS  (STANDING):  cefTRIAXone   IVPB 2000 milliGRAM(s) IV Intermittent every 24 hours  diltiazem    Tablet 120 milliGRAM(s) Oral daily  enoxaparin Injectable 40 milliGRAM(s) SubCutaneous every 24 hours  montelukast 10 milliGRAM(s) Oral daily  pantoprazole    Tablet 40 milliGRAM(s) Oral before breakfast  posaconazole DR Tablet 300 milliGRAM(s) Oral daily    MEDICATIONS  (PRN):  albuterol    90 MICROgram(s) HFA Inhaler 2 Puff(s) Inhalation every 6 hours PRN Shortness of Breath and/or Wheezing  benzonatate 100 milliGRAM(s) Oral three times a day PRN Cough  guaiFENesin Oral Liquid (Sugar-Free) 100 milliGRAM(s) Oral every 6 hours PRN Cough  melatonin 3 milliGRAM(s) Oral at bedtime PRN Insomnia      ALLERGIES:  Allergies    Nuts (Other; Rash)  avocado (Stomach Upset)  aspirin (Other)  penicillins (Other)  peanuts (Stomach Upset; Other)  citrus (Other)  Sesame (Other)    Intolerances        LABS:                        11.3   18.64 )-----------( 309      ( 26 May 2025 05:30 )             32.7     05-26    125[L]  |  91[L]  |  14  ----------------------------<  114[H]  4.8   |  22  |  0.83    Ca    8.7      26 May 2025 05:30  Phos  4.0     05-26  Mg     2.1     05-26    TPro  6.4  /  Alb  2.7[L]  /  TBili  0.6  /  DBili  x   /  AST  25  /  ALT  78[H]  /  AlkPhos  483[H]  05-26      Urinalysis Basic - ( 26 May 2025 05:30 )    Color: x / Appearance: x / SG: x / pH: x  Gluc: 114 mg/dL / Ketone: x  / Bili: x / Urobili: x   Blood: x / Protein: x / Nitrite: x   Leuk Esterase: x / RBC: x / WBC x   Sq Epi: x / Non Sq Epi: x / Bacteria: x      CAPILLARY BLOOD GLUCOSE          RADIOLOGY & ADDITIONAL TESTS: Reviewed.

## 2025-05-26 NOTE — CHART NOTE - NSCHARTNOTEFT_GEN_A_CORE
Infectious Diseases Anti-infective Approval Note    Medication:  Posaconazole  Dose:  300 mg  Route:  PO   Frequency:  daily  Duration**:  duration of hospitalization  Purpose:  (check one)       Empiric: pending cultures       Empiric:  no culture data       Final duration:  X    Dose may be adjusted as needed for alterations in renal function.    *THIS IS NOT AN INFECTIOUS DISEASES CONSULTATION*    **Indicates duration of approval, not necessarily duration of treatment.

## 2025-05-27 ENCOUNTER — APPOINTMENT (OUTPATIENT)
Dept: INFUSION THERAPY | Facility: CLINIC | Age: 70
End: 2025-05-27

## 2025-05-27 ENCOUNTER — TRANSCRIPTION ENCOUNTER (OUTPATIENT)
Age: 70
End: 2025-05-27

## 2025-05-27 ENCOUNTER — APPOINTMENT (OUTPATIENT)
Dept: HEMATOLOGY ONCOLOGY | Facility: CLINIC | Age: 70
End: 2025-05-27

## 2025-05-27 ENCOUNTER — OUTPATIENT (OUTPATIENT)
Dept: OUTPATIENT SERVICES | Facility: HOSPITAL | Age: 70
LOS: 1 days | End: 2025-05-27
Payer: MEDICARE

## 2025-05-27 VITALS
OXYGEN SATURATION: 98 % | SYSTOLIC BLOOD PRESSURE: 128 MMHG | RESPIRATION RATE: 17 BRPM | DIASTOLIC BLOOD PRESSURE: 82 MMHG | TEMPERATURE: 99 F | HEART RATE: 80 BPM

## 2025-05-27 VITALS
TEMPERATURE: 98 F | DIASTOLIC BLOOD PRESSURE: 62 MMHG | HEART RATE: 77 BPM | SYSTOLIC BLOOD PRESSURE: 112 MMHG | OXYGEN SATURATION: 98 % | RESPIRATION RATE: 18 BRPM

## 2025-05-27 VITALS
WEIGHT: 143.08 LBS | HEIGHT: 72 IN | TEMPERATURE: 98 F | RESPIRATION RATE: 18 BRPM | HEART RATE: 78 BPM | SYSTOLIC BLOOD PRESSURE: 121 MMHG | OXYGEN SATURATION: 97 % | DIASTOLIC BLOOD PRESSURE: 78 MMHG

## 2025-05-27 DIAGNOSIS — C22.1 INTRAHEPATIC BILE DUCT CARCINOMA: ICD-10-CM

## 2025-05-27 DIAGNOSIS — Z92.3 PERSONAL HISTORY OF IRRADIATION: Chronic | ICD-10-CM

## 2025-05-27 DIAGNOSIS — Z98.890 OTHER SPECIFIED POSTPROCEDURAL STATES: Chronic | ICD-10-CM

## 2025-05-27 DIAGNOSIS — Z92.21 PERSONAL HISTORY OF ANTINEOPLASTIC CHEMOTHERAPY: Chronic | ICD-10-CM

## 2025-05-27 LAB
ANION GAP SERPL CALC-SCNC: 10 MMOL/L — SIGNIFICANT CHANGE UP (ref 5–17)
ANION GAP SERPL CALC-SCNC: 11 MMOL/L — SIGNIFICANT CHANGE UP (ref 5–17)
BUN SERPL-MCNC: 18 MG/DL — SIGNIFICANT CHANGE UP (ref 7–23)
BUN SERPL-MCNC: 18 MG/DL — SIGNIFICANT CHANGE UP (ref 7–23)
C DIFF GDH STL QL: NEGATIVE — SIGNIFICANT CHANGE UP
C DIFF GDH STL QL: SIGNIFICANT CHANGE UP
CALCIUM SERPL-MCNC: 8.5 MG/DL — SIGNIFICANT CHANGE UP (ref 8.4–10.5)
CALCIUM SERPL-MCNC: 8.9 MG/DL — SIGNIFICANT CHANGE UP (ref 8.4–10.5)
CHLORIDE SERPL-SCNC: 92 MMOL/L — LOW (ref 96–108)
CHLORIDE SERPL-SCNC: 94 MMOL/L — LOW (ref 96–108)
CO2 SERPL-SCNC: 23 MMOL/L — SIGNIFICANT CHANGE UP (ref 22–31)
CO2 SERPL-SCNC: 24 MMOL/L — SIGNIFICANT CHANGE UP (ref 22–31)
CREAT SERPL-MCNC: 0.76 MG/DL — SIGNIFICANT CHANGE UP (ref 0.5–1.3)
CREAT SERPL-MCNC: 0.89 MG/DL — SIGNIFICANT CHANGE UP (ref 0.5–1.3)
EGFR: 94 ML/MIN/1.73M2 — SIGNIFICANT CHANGE UP
EGFR: 94 ML/MIN/1.73M2 — SIGNIFICANT CHANGE UP
EGFR: 99 ML/MIN/1.73M2 — SIGNIFICANT CHANGE UP
EGFR: 99 ML/MIN/1.73M2 — SIGNIFICANT CHANGE UP
GI PCR PANEL: SIGNIFICANT CHANGE UP
GLUCOSE SERPL-MCNC: 115 MG/DL — HIGH (ref 70–99)
GLUCOSE SERPL-MCNC: 116 MG/DL — HIGH (ref 70–99)
HCT VFR BLD CALC: 34.5 % — LOW (ref 39–50)
HGB BLD-MCNC: 11.6 G/DL — LOW (ref 13–17)
MAGNESIUM SERPL-MCNC: 2.1 MG/DL — SIGNIFICANT CHANGE UP (ref 1.6–2.6)
MCHC RBC-ENTMCNC: 33.4 PG — SIGNIFICANT CHANGE UP (ref 27–34)
MCHC RBC-ENTMCNC: 33.6 G/DL — SIGNIFICANT CHANGE UP (ref 32–36)
MCV RBC AUTO: 99.4 FL — SIGNIFICANT CHANGE UP (ref 80–100)
NRBC BLD AUTO-RTO: 0 /100 WBCS — SIGNIFICANT CHANGE UP (ref 0–0)
OSMOLALITY SERPL: 268 MOSM/KG — LOW (ref 280–301)
PHOSPHATE SERPL-MCNC: 4.4 MG/DL — SIGNIFICANT CHANGE UP (ref 2.5–4.5)
PLATELET # BLD AUTO: 330 K/UL — SIGNIFICANT CHANGE UP (ref 150–400)
POTASSIUM SERPL-MCNC: 4.8 MMOL/L — SIGNIFICANT CHANGE UP (ref 3.5–5.3)
POTASSIUM SERPL-MCNC: 5.1 MMOL/L — SIGNIFICANT CHANGE UP (ref 3.5–5.3)
POTASSIUM SERPL-SCNC: 4.8 MMOL/L — SIGNIFICANT CHANGE UP (ref 3.5–5.3)
POTASSIUM SERPL-SCNC: 5.1 MMOL/L — SIGNIFICANT CHANGE UP (ref 3.5–5.3)
RBC # BLD: 3.47 M/UL — LOW (ref 4.2–5.8)
RBC # FLD: 13.9 % — SIGNIFICANT CHANGE UP (ref 10.3–14.5)
SODIUM SERPL-SCNC: 127 MMOL/L — LOW (ref 135–145)
SODIUM SERPL-SCNC: 127 MMOL/L — LOW (ref 135–145)
WBC # BLD: 13.73 K/UL — HIGH (ref 3.8–10.5)
WBC # FLD AUTO: 13.73 K/UL — HIGH (ref 3.8–10.5)

## 2025-05-27 PROCEDURE — 82570 ASSAY OF URINE CREATININE: CPT

## 2025-05-27 PROCEDURE — 80307 DRUG TEST PRSMV CHEM ANLYZR: CPT

## 2025-05-27 PROCEDURE — 87070 CULTURE OTHR SPECIMN AEROBIC: CPT

## 2025-05-27 PROCEDURE — 84145 PROCALCITONIN (PCT): CPT

## 2025-05-27 PROCEDURE — 83930 ASSAY OF BLOOD OSMOLALITY: CPT

## 2025-05-27 PROCEDURE — 81003 URINALYSIS AUTO W/O SCOPE: CPT

## 2025-05-27 PROCEDURE — 85027 COMPLETE CBC AUTOMATED: CPT

## 2025-05-27 PROCEDURE — 80048 BASIC METABOLIC PNL TOTAL CA: CPT

## 2025-05-27 PROCEDURE — 87040 BLOOD CULTURE FOR BACTERIA: CPT

## 2025-05-27 PROCEDURE — 87449 NOS EACH ORGANISM AG IA: CPT

## 2025-05-27 PROCEDURE — 87637 SARSCOV2&INF A&B&RSV AMP PRB: CPT

## 2025-05-27 PROCEDURE — 93005 ELECTROCARDIOGRAM TRACING: CPT

## 2025-05-27 PROCEDURE — 86850 RBC ANTIBODY SCREEN: CPT

## 2025-05-27 PROCEDURE — 84133 ASSAY OF URINE POTASSIUM: CPT

## 2025-05-27 PROCEDURE — 84300 ASSAY OF URINE SODIUM: CPT

## 2025-05-27 PROCEDURE — 87205 SMEAR GRAM STAIN: CPT

## 2025-05-27 PROCEDURE — 82565 ASSAY OF CREATININE: CPT

## 2025-05-27 PROCEDURE — 83935 ASSAY OF URINE OSMOLALITY: CPT

## 2025-05-27 PROCEDURE — 36415 COLL VENOUS BLD VENIPUNCTURE: CPT

## 2025-05-27 PROCEDURE — 87899 AGENT NOS ASSAY W/OPTIC: CPT

## 2025-05-27 PROCEDURE — 85025 COMPLETE CBC W/AUTO DIFF WBC: CPT

## 2025-05-27 PROCEDURE — 96374 THER/PROPH/DIAG INJ IV PUSH: CPT

## 2025-05-27 PROCEDURE — 80053 COMPREHEN METABOLIC PANEL: CPT

## 2025-05-27 PROCEDURE — 99239 HOSP IP/OBS DSCHRG MGMT >30: CPT

## 2025-05-27 PROCEDURE — 74177 CT ABD & PELVIS W/CONTRAST: CPT

## 2025-05-27 PROCEDURE — 87594 PNEUMCYSTS JIROVECII AMP PRB: CPT

## 2025-05-27 PROCEDURE — 96375 TX/PRO/DX INJ NEW DRUG ADDON: CPT

## 2025-05-27 PROCEDURE — 80076 HEPATIC FUNCTION PANEL: CPT

## 2025-05-27 PROCEDURE — 84156 ASSAY OF PROTEIN URINE: CPT

## 2025-05-27 PROCEDURE — 83605 ASSAY OF LACTIC ACID: CPT

## 2025-05-27 PROCEDURE — 84540 ASSAY OF URINE/UREA-N: CPT

## 2025-05-27 PROCEDURE — 87507 IADNA-DNA/RNA PROBE TQ 12-25: CPT

## 2025-05-27 PROCEDURE — 86900 BLOOD TYPING SEROLOGIC ABO: CPT

## 2025-05-27 PROCEDURE — 83735 ASSAY OF MAGNESIUM: CPT

## 2025-05-27 PROCEDURE — 94640 AIRWAY INHALATION TREATMENT: CPT

## 2025-05-27 PROCEDURE — 0225U NFCT DS DNA&RNA 21 SARSCOV2: CPT

## 2025-05-27 PROCEDURE — 87324 CLOSTRIDIUM AG IA: CPT

## 2025-05-27 PROCEDURE — 96523 IRRIG DRUG DELIVERY DEVICE: CPT

## 2025-05-27 PROCEDURE — 71045 X-RAY EXAM CHEST 1 VIEW: CPT

## 2025-05-27 PROCEDURE — 83880 ASSAY OF NATRIURETIC PEPTIDE: CPT

## 2025-05-27 PROCEDURE — 84100 ASSAY OF PHOSPHORUS: CPT

## 2025-05-27 PROCEDURE — 71260 CT THORAX DX C+: CPT

## 2025-05-27 PROCEDURE — 99285 EMERGENCY DEPT VISIT HI MDM: CPT

## 2025-05-27 PROCEDURE — 86901 BLOOD TYPING SEROLOGIC RH(D): CPT

## 2025-05-27 RX ORDER — MAGNESIUM GLUCONATE 27 MG(500)
500 TABLET ORAL
Refills: 0 | DISCHARGE

## 2025-05-27 RX ORDER — RIBOFLAVIN (VITAMIN B2) 100 MG
1 TABLET ORAL
Refills: 0 | DISCHARGE

## 2025-05-27 RX ORDER — CEFPODOXIME PROXETIL 200 MG/1
1 TABLET, FILM COATED ORAL
Qty: 4 | Refills: 0
Start: 2025-05-27 | End: 2025-05-28

## 2025-05-27 RX ORDER — POSACONAZOLE 100 MG/1
3 TABLET, DELAYED RELEASE ORAL
Qty: 0 | Refills: 0 | DISCHARGE
Start: 2025-05-27

## 2025-05-27 RX ORDER — POLYETHYLENE GLYCOL 3350 17 G/17G
17 POWDER, FOR SOLUTION ORAL DAILY
Refills: 0 | Status: DISCONTINUED | OUTPATIENT
Start: 2025-05-27 | End: 2025-05-27

## 2025-05-27 RX ORDER — HYDROCODONE/HOMATROPINE
5 SYRUP ORAL
Qty: 35 | Refills: 0
Start: 2025-05-27 | End: 2025-06-02

## 2025-05-27 RX ADMIN — Medication 1 LOZENGE: at 11:00

## 2025-05-27 RX ADMIN — Medication 2 PUFF(S): at 00:19

## 2025-05-27 RX ADMIN — Medication 40 MILLIGRAM(S): at 06:34

## 2025-05-27 RX ADMIN — DILTIAZEM HYDROCHLORIDE 120 MILLIGRAM(S): 240 TABLET, EXTENDED RELEASE ORAL at 06:34

## 2025-05-27 RX ADMIN — POSACONAZOLE 300 MILLIGRAM(S): 100 TABLET, DELAYED RELEASE ORAL at 11:01

## 2025-05-27 RX ADMIN — MONTELUKAST SODIUM 10 MILLIGRAM(S): 10 TABLET ORAL at 11:00

## 2025-05-27 RX ADMIN — DEXTROMETHORPHAN HBR, GUAIFENESIN 100 MILLIGRAM(S): 200 LIQUID ORAL at 11:00

## 2025-05-27 RX ADMIN — ENOXAPARIN SODIUM 40 MILLIGRAM(S): 100 INJECTION SUBCUTANEOUS at 00:15

## 2025-05-27 RX ADMIN — CEFTRIAXONE 100 MILLIGRAM(S): 500 INJECTION, POWDER, FOR SOLUTION INTRAMUSCULAR; INTRAVENOUS at 00:15

## 2025-05-27 RX ADMIN — Medication 1 GRAM(S): at 06:34

## 2025-05-27 NOTE — PROGRESS NOTE ADULT - ASSESSMENT
69M PMH intrahepatic cholangiocarcinoma (on GemOX + FUDR FELIX, last dose 4/28/25), HTN, HLD, asthma, aspergillosis, [place] Ca [place] [place] who presents to the ER on recommendation of his primary oncologist for outpatient imaging concerning for pneumonia, admitted for further management. Heme/onc consulted for known intrahepatic cholangiocarcinoma.    # intrahepatic cholangiocarcinoma  # pneumonia  # elevated alkaline phosphatase    - Notes two weeks of fevers, night sweats and rigors with nonproductive cough. Outpatient CT notable for multilobar PNA.  - Not meeting severe sepsis criteria on admission, however, suspect fever has been masked by antipyretics. Impressive leukocytosis with lactic acidosis.  - CT CAP 5/22/25 showing interval improvement in hepatic mass/cholangiocarcinoma.  - Has been using high daily doses of Tylenol for fevers; suspect transaminitis is sequelae of this. No n/v or abdominal pain.    Plan:  - appreciate treatment of underlying infection per primary team  - no plans for inpatient chemotherapy at this time  - he requires reservoir fill for FELIX pump on 5/27/25; will arrange to be done inpatient if remains hospitalized  - has follow up with Dr. Irvin Hancock on 5/27/25; please include in discharge summary    Heme/onc will continue to follow. Case discussed with Dr. Khadijah Felipe.
69M PMH intrahepatic cholangiocarcinoma (on GemOX + FUDR FELIX, last dose 4/28/25), HTN, HLD, asthma, aspergillosis, prostate Ca ([facility] [place]), who presents to the ER on recommendation of his primary oncologist for outpatient imaging concerning for pneumonia, admitted for further management. Heme/onc consulted for known intrahepatic cholangiocarcinoma.    # intrahepatic cholangiocarcinoma  # pneumonia  # elevated alkaline phosphatase  - Notes two weeks of fevers, night sweats and rigors with nonproductive cough. Outpatient CT notable for multilobar PNA.  - Not meeting severe sepsis criteria on admission, however, suspect fever has been masked by antipyretics.   - CT CAP 5/22/25 showing interval improvement in hepatic mass/cholangiocarcinoma.  - Has been using high daily doses of Tylenol for fevers; suspect transaminitis is sequelae of this. No n/v or abdominal pain.    Plan:  - appreciate treatment of PNA/hyponatremia per primary team  - no plans for inpatient chemotherapy at this time  - he requires reservoir fill for FELIX pump on 5/27/25; to be done at [facility] today if patient discharged, if not will coordinate inpatient   - will arrange for follow up with Dr. Hancock upon discharge as well
68 y/o male PMHx of  cholangiocarcinoma  s/p chemo (right chest chemo port), ex lap, hepatic artery infusion pump (HAIP), celiac LN Bx and girish on 4/2/25, invasive aspergillosis s/p 4 months of Posaconazole 3/23, ocular toxoplasmosis,  prostate cancer s/p radiation in 2014, HTN, HLD, aortic aneurysm, colonic polyps, asthma (mild intermittent), GERD with reflux esophagitis, presenting for multilobar PNA on outpatient imaging.  

## 2025-05-27 NOTE — PROGRESS NOTE ADULT - PROBLEM SELECTOR PLAN 5
Patient with known hx AoCD. Hb currently at baseline.   - Active T&S  - Transfuse for Hb < 7

## 2025-05-27 NOTE — PROGRESS NOTE ADULT - PROBLEM SELECTOR PLAN 2
Na 125 on admission. Patient a&o x 4. Mentating well. Asymptomatic.   Patient received 3 L NS prior to collection of Ur studies which suggest hypotonic hyponatremia. appears euvolemic on exam, and sodium unchanged s/p fluids. ?SIADH   - Monitor BMP q6h w/ repeat urine lytes  - start Fluid restrict 1.5 L   - Salt tablets 1g BID started 5/26/2025 Na 125 on admission. Patient a&o x 4. Mentating well. Asymptomatic.   Patient received 3 L NS prior to collection of Ur studies which suggest hypotonic hyponatremia. appears euvolemic on exam, and sodium unchanged s/p fluids. likely 2/2 SIADH     - Monitor BMP q6h w/ repeat urine lytes  - dc with instructions for 1L fluid restriction   - Salt tablets 1g BID started 5/26/2025 Na 125 on admission. Patient a&o x 4. Mentating well. Asymptomatic.   Patient received 3 L NS prior to collection of Ur studies which suggest hypotonic hyponatremia. appears euvolemic on exam, and sodium unchanged s/p fluids. likely 2/2 SIADH     - Monitor BMP q6h w/ repeat urine lytes  - dc with instructions for 1L fluid restriction   - Salt tablets 1g BID started 5/26/2025, will continue on dc

## 2025-05-27 NOTE — DISCHARGE NOTE PROVIDER - CARE PROVIDER_API CALL
Im, Kalina MCKEON)  Internal Medicine  [address]  [place] [address]  Phone: [phone]  Fax: [phone]  Scheduled Appointment: 06/03/2025 12:00 PM

## 2025-05-27 NOTE — PROGRESS NOTE ADULT - PROBLEM SELECTOR PROBLEM 4
Intrahepatic cholangiocarcinoma

## 2025-05-27 NOTE — PROGRESS NOTE ADULT - PROBLEM SELECTOR PLAN 10
DVT: Lovenox  GI: Home Protonix 40 mg   IVF: s/p 3 L NS  E: Replete as needed   N: Regular, K restricted DVT: Lovenox  GI: Home Protonix 40 mg   IVF: fluid restriction   E: Replete as needed   N: Regular, K restricted

## 2025-05-27 NOTE — DISCHARGE NOTE PROVIDER - NSDCCPCAREPLAN_GEN_ALL_CORE_FT
PRINCIPAL DISCHARGE DIAGNOSIS  Diagnosis: Pneumonia  Assessment and Plan of Treatment: An infection of the air sacs in one or both the lungs. Characterized by severe cough with phlegm, fever, chills and difficulty in breathing. This lung infection is treated with antibiotics or antifungals to kill the bacteria or fungus causing the infection. During your hospitalization, your were treated with IV antibiotics which helped treat your infection. You should continue to take an oral antibiotic called Cefpodoxime 200mg once in the morning and once at night on 5/28/2025 and 5/29/2025.      SECONDARY DISCHARGE DIAGNOSES  Diagnosis: Syndrome of inappropriate ADH production  Assessment and Plan of Treatment: SIADH, or Syndrome of Inappropriate Antidiuretic Hormone Secretion, is a condition where the body releases too much antidiuretic hormone (ADH), causing water retention and potentially low blood sodium (hyponatremia). This excess ADH leads to the kidneys retaining more water, resulting in concentrated urine and a buildup of fluid in the body. You should take salt tablets every 12 hours and restrict your fluid intake to 1 liter a day until you are able to follow up with a provider for repeat lab work.    Diagnosis: Intrahepatic cholangiocarcinoma  Assessment and Plan of Treatment: Intrahepatic cholangiocarcinoma (ICC) is a rare, aggressive form of bile duct cancer that develops within the bile ducts inside the liver. It's characterized by cancer cells originating from the epithelial lining of the intrahepatic bile ducts, which are the smaller bile ducts within the liver. Please continue to follow with your oncologist Dr. Hancock to manage this condition. You should also go directly to [facility] after you are discharged from the hospital to ensure your stent is addressed in a timely manner.    Diagnosis: History of pulmonary aspergillosis  Assessment and Plan of Treatment: Please continue to take your home posaconazole once a day to prevent a fungal infection.

## 2025-05-27 NOTE — DISCHARGE NOTE PROVIDER - HOSPITAL COURSE
68 y/o male PMHx of  cholangiocarcinoma  s/p chemo (right chest chemo port), ex lap, hepatic artery infusion pump (HAIP), celiac LN Bx and girish on 4/2/25, invasive aspergillosis s/p 4 months of Posaconazole 3/23, ocular toxoplasmosis,  prostate cancer s/p radiation in 2014, HTN, HLD, aortic aneurysm, colonic polyps, asthma (mild intermittent), GERD with reflux esophagitis, presenting for multilobar PNA on outpatient imaging. Pt started on CTX and posaconazole for pna treatment with improvement in clinical status and WBC downtrended. Pt has hepatic artery infusion pump and was scheduled to have the pump refilled/ 68 y/o male PMHx of  cholangiocarcinoma  s/p chemo (right chest chemo port), ex lap, hepatic artery infusion pump (HAIP), celiac LN Bx and girish on 4/2/25, invasive aspergillosis s/p 4 months of Posaconazole 3/23, ocular toxoplasmosis,  prostate cancer s/p radiation in 2014, HTN, HLD, aortic aneurysm, colonic polyps, asthma (mild intermittent), GERD with reflux esophagitis, presenting for multilobar PNA on outpatient imaging. Pt started on CTX and posaconazole for pna treatment with improvement in clinical status and WBC downtrended. Pt has hepatic artery infusion pump and was scheduled to have the pump refilled/flushed on 5/27. per heme onc service, this procedure must be done promptly. Pt noted to have hyponatremia likely 2/2 to SIADH that improved with gentle fluid resuscitation. Pt sodium trended closely. Noted to have Na 127     #Discharge: do not delete    Patient is __ yo M/F with past medical history of _____ presented with _____, found to have _____ (one liner)    Hospital course (by problem):     Patient was discharged to: (home/[facility]/acute rehab/hospice, etc, and with what services – home health PT/RN? Home O2?)    New medications:   Changes to old medications:  Medications that were stopped:    Items to follow up as outpatient:    Physical exam at the time of discharge:       70 y/o male PMHx of  cholangiocarcinoma  s/p chemo (right chest chemo port), ex lap, hepatic artery infusion pump (HAIP), celiac LN Bx and girish on 4/2/25, invasive aspergillosis s/p 4 months of Posaconazole 3/23, ocular toxoplasmosis,  prostate cancer s/p radiation in 2014, HTN, HLD, aortic aneurysm, colonic polyps, asthma (mild intermittent), GERD with reflux esophagitis, presenting for multilobar PNA on outpatient imaging. Pt started on CTX and posaconazole for pna treatment with improvement in clinical status and WBC downtrended. Pt has hepatic artery infusion pump and was scheduled to have the pump refilled/flushed on 5/27. per heme onc service, this procedure must be done promptly and patient was instructed to go to [facility] for the procedure as soon as he leaves the hospital. Pt noted to have hyponatremia likely 2/2 to SIADH that improved with gentle fluid resuscitation. Pt sodium trended closely. Noted to have Na 127 on both AM labs and early afternoon. Pt dc with salt tablets and instructed to remain on a fluid restriction for a few days post discharge. Pt also will be discharged with 2 days of cefpodoxime 200 mg BID to complete a 5 day course for CAP treatment (s/p 3 doses CTX) with a week supply of hycodan syrup to be taken at bedtime for cough suppression.       Hospital course (by problem):     # Pneumonia.   Pt does not meets criteria for SIRS/Sepsis (1/4 for leukocytosis), however has PNA as source of infection on imaging.   Of note fevers may have been masked by Tylenol use at home.   Also found to have lactic acidosis; now resolved s/p 3 L NS in the ED.   Urine strep and legionella negative, full RVP negative.   Given 2 wks of symptoms without improvement, likely viral PNA with superimposed bacterial PNA, favor covering for CAP in immunocompromised patient.     - Continue with 200mg cefpodoxime BID   - C/w Posaconazole 300mg qd, home med     # Hyponatremia. #SIADH   Na 125 on admission. Patient a&o x 4. Mentating well. Asymptomatic.   Patient received 3 L NS prior to collection of Ur studies which suggest hypotonic hyponatremia. appears euvolemic on exam, and sodium unchanged s/p fluids.      - c/w Salt tablets 1g BID   - pt instructed to maintain fluid restriction for a few days after dc     # Intrahepatic cholangiocarcinoma.   Heme/Onc consulted for known intrahepatic cholangiocarcinoma. No plans for inpatient chemotherapy at this time  Patient has follow up with his oncologist, Dr. Irvin Hancock on 5/27/25.    #Anemia of chronic disease.    Patient with known hx AoCD. Hb currently at baseline.     # History of pulmonary aspergillosis.   Aspergillosis diagnosed in the context of cavitating pulmonary lesions, treated with  4 months of Posaconazole in 3/23 by Dr. Bland at [place]. Patient currently takes Posaconazole 300 mg QD for ppx of invasive fungal infections    - C/w with home Posaconazole 300 mg QD.    #Hyperlipidemia.   c/w home Atorvastatin 10 mg.    # Asthma.   Home reg: Singulair 10 mg QD, Albuterol inhaler, Arnuity Ellipta  - C/w home regimen.    # Tachyarrhythmia.   Patient is on Diltiazem 120 mg ER QD for "premature heart beats".   Confirmed in dispense hx and HIE.   Possibly iso ventricular PVCs vs SVT. However EKG today with normal rate and sinus rhythm.   - Obtain collateral   - C/w home Diltiazem 120 mg E    Patient was discharged to: home     New medications: Cefpodoxime 200 mg BID, hycodan cough syrup, salt tabs  Changes to old medications: none   Medications that were stopped: none     Items to follow up as outpatient:    Physical exam at the time of discharge:  General: Alert and oriented x 3. Underweight   Eyes: EOMI. Anicteric.  HEENT: Moist mucous membranes. No scleral icterus. No cervical lymphadenopathy.  Lungs: Clear to auscultation bilaterally. No accessory muscle use.  Cardiovascular: Regular rate and rhythm. No murmur. No JVD.  Abdomen: Soft, non-tender and non-distended. No palpable masses.  Extremities: No edema. Non-tender.  Skin: No rashes or lesions. Warm.  Neurologic: No focal neurological deficits. CN II-XII grossly intact, but not individually tested.  Psychiatric: Cooperative. Appropriate mood and affect.         68 y/o male PMHx of  cholangiocarcinoma  s/p chemo (right chest chemo port), ex lap, hepatic artery infusion pump (HAIP), celiac LN Bx and girish on 4/2/25, invasive aspergillosis s/p 4 months of Posaconazole 3/23, ocular toxoplasmosis,  prostate cancer s/p radiation in 2014, HTN, HLD, aortic aneurysm, colonic polyps, asthma (mild intermittent), GERD with reflux esophagitis, presenting for multilobar PNA on outpatient imaging. Pt started on CTX and posaconazole for pna treatment with improvement in clinical status and WBC downtrended. Pt has hepatic artery infusion pump and was scheduled to have the pump refilled/flushed on 5/27. per heme onc service, this procedure must be done promptly and patient was instructed to go to [facility] for the procedure as soon as he leaves the hospital. Pt noted to have hyponatremia likely 2/2 to SIADH that improved with gentle fluid resuscitation. Pt sodium trended closely. Noted to have Na 127 on both AM labs and early afternoon. Pt dc with salt tablets and instructed to remain on a fluid restriction for a few days post discharge. Pt also will be discharged with 2 days of cefpodoxime 200 mg BID to complete a 5 day course for CAP treatment (s/p 3 doses CTX) with a week supply of hycodan syrup to be taken at bedtime for cough suppression.       Hospital course (by problem):     # Pneumonia.   Pt does not meets criteria for SIRS/Sepsis (1/4 for leukocytosis), however has PNA as source of infection on imaging.   Of note fevers may have been masked by Tylenol use at home.   Also found to have lactic acidosis; now resolved s/p 3 L NS in the ED.   Urine strep and legionella negative, full RVP negative.   Given 2 wks of symptoms without improvement, likely viral PNA with superimposed bacterial PNA, favor covering for CAP in immunocompromised patient.     - Continue with 200mg cefpodoxime BID   - C/w Posaconazole 300mg qd, home med     # Hyponatremia. #SIADH   Na 125 on admission. Patient a&o x 4. Mentating well. Asymptomatic.   Patient received 3 L NS prior to collection of Ur studies which suggest hypotonic hyponatremia. appears euvolemic on exam, and sodium unchanged s/p fluids.      - c/w Salt tablets 1g BID   - pt instructed to maintain fluid restriction for a few days after dc . instructed to follow with Dr Hancock to recheck sodium and urine studies     # Intrahepatic cholangiocarcinoma.   Heme/Onc consulted for known intrahepatic cholangiocarcinoma. No plans for inpatient chemotherapy at this time  Patient has follow up with his oncologist, Dr. Irvin Hancock on 5/27/25.    #Anemia of chronic disease.    Patient with known hx AoCD. Hb currently at baseline.     # History of pulmonary aspergillosis.   Aspergillosis diagnosed in the context of cavitating pulmonary lesions, treated with  4 months of Posaconazole in 3/23 by Dr. Bland at [place]. Patient currently takes Posaconazole 300 mg QD for ppx of invasive fungal infections    - C/w with home Posaconazole 300 mg QD.    #Hyperlipidemia.   c/w home Atorvastatin 10 mg.    # Asthma.   Home reg: Singulair 10 mg QD, Albuterol inhaler, Arnuity Ellipta  - C/w home regimen.    # Tachyarrhythmia.   Patient is on Diltiazem 120 mg ER QD for "premature heart beats".   Confirmed in dispense hx and HIE.   Possibly iso ventricular PVCs vs SVT. However EKG today with normal rate and sinus rhythm.   - Obtain collateral   - C/w home Diltiazem 120 mg E    Patient was discharged to: home     New medications: Cefpodoxime 200 mg BID, hycodan cough syrup, salt tabs  Changes to old medications: none   Medications that were stopped: none     Items to follow up as outpatient:    Physical exam at the time of discharge:  General: Alert and oriented x 3. Underweight   Eyes: EOMI. Anicteric.  HEENT: Moist mucous membranes. No scleral icterus. No cervical lymphadenopathy.  Lungs: Clear to auscultation bilaterally. No accessory muscle use.  Cardiovascular: Regular rate and rhythm. No murmur. No JVD.  Abdomen: Soft, non-tender and non-distended. No palpable masses.  Extremities: No edema. Non-tender.  Skin: No rashes or lesions. Warm.  Neurologic: No focal neurological deficits. CN II-XII grossly intact, but not individually tested.  Psychiatric: Cooperative. Appropriate mood and affect.         70 y/o male PMHx of  cholangiocarcinoma  s/p chemo (right chest chemo port), ex lap, hepatic artery infusion pump (HAIP), celiac LN Bx and girish on 4/2/25, invasive aspergillosis s/p 4 months of Posaconazole 3/23, ocular toxoplasmosis,  prostate cancer s/p radiation in 2014, HTN, HLD, aortic aneurysm, colonic polyps, asthma (mild intermittent), GERD with reflux esophagitis, presenting for multilobar PNA on outpatient imaging. Pt started on CTX and posaconazole for pna treatment with improvement in clinical status and WBC downtrended. Pt has hepatic artery infusion pump and was scheduled to have the pump refilled/flushed on 5/27. per heme onc service, this procedure must be done promptly and patient was instructed to go to [facility] for the procedure as soon as he leaves the hospital. Pt noted to have hyponatremia likely 2/2 to SIADH that improved with gentle fluid resuscitation. Pt sodium trended closely. Noted to have Na 127 on both AM labs and early afternoon. Pt dc with salt tablets and instructed to remain on a fluid restriction for a few days post discharge. Pt also will be discharged with 2 days of cefpodoxime 200 mg BID to complete a 5 day course for CAP treatment (s/p 3 doses CTX) with a week supply of hycodan syrup to be taken at bedtime for cough suppression.       Hospital course (by problem):     # Pneumonia.   Pt does not meets criteria for SIRS/Sepsis (1/4 for leukocytosis), however has PNA as source of infection on imaging.   Of note fevers may have been masked by Tylenol use at home.   Also found to have lactic acidosis; now resolved s/p 3 L NS in the ED.   Urine strep and legionella negative, full RVP negative.   Given 2 wks of symptoms without improvement, likely viral PNA with superimposed bacterial PNA, favor covering for CAP in immunocompromised patient.     - Continue with 200mg cefpodoxime BID   - C/w Posaconazole 300mg qd, home med     # Hyponatremia. #SIADH   Na 125 on admission. Patient a&o x 4. Mentating well. Asymptomatic.   Patient received 3 L NS prior to collection of Ur studies which suggest hypotonic hyponatremia. appears euvolemic on exam, and sodium unchanged s/p fluids.      - c/w Salt tablets 1g BID   - pt instructed to maintain fluid restriction for a few days after dc . instructed to follow with Dr Hancock to recheck sodium and urine studies     # Intrahepatic cholangiocarcinoma.   Heme/Onc consulted for known intrahepatic cholangiocarcinoma. No plans for inpatient chemotherapy at this time  Patient has follow up with his oncologist, Dr. Irvin Hancock on 5/27/25.    - pt to go to [facility] directly from hospital to have his port flushed today     #Anemia of chronic disease.    Patient with known hx AoCD. Hb currently at baseline.     # History of pulmonary aspergillosis.   Aspergillosis diagnosed in the context of cavitating pulmonary lesions, treated with  4 months of Posaconazole in 3/23 by Dr. Bland at [place]. Patient currently takes Posaconazole 300 mg QD for ppx of invasive fungal infections    - C/w with home Posaconazole 300 mg QD.    #Hyperlipidemia.   c/w home Atorvastatin 10 mg.    # Asthma.   Home reg: Singulair 10 mg QD, Albuterol inhaler, Arnuity Ellipta  - C/w home regimen.    # Tachyarrhythmia.   Patient is on Diltiazem 120 mg ER QD for "premature heart beats".   Confirmed in dispense hx and HIE.   Possibly iso ventricular PVCs vs SVT. However EKG today with normal rate and sinus rhythm.   - C/w home Diltiazem 120 mg E    Patient was discharged to: home     New medications: Cefpodoxime 200 mg BID, hycodan cough syrup, salt tabs  Changes to old medications: none   Medications that were stopped: none     Items to follow up as outpatient:    Physical exam at the time of discharge:  General: Alert and oriented x 3. Underweight   Eyes: EOMI. Anicteric.  HEENT: Moist mucous membranes. No scleral icterus. No cervical lymphadenopathy.  Lungs: Clear to auscultation bilaterally. No accessory muscle use.  Cardiovascular: Regular rate and rhythm. No murmur. No JVD.  Abdomen: Soft, non-tender and non-distended. No palpable masses.  Extremities: No edema. Non-tender.  Skin: No rashes or lesions. Warm.  Neurologic: No focal neurological deficits. CN II-XII grossly intact, but not individually tested.  Psychiatric: Cooperative. Appropriate mood and affect.

## 2025-05-27 NOTE — PROGRESS NOTE ADULT - PROBLEM SELECTOR PLAN 3
, AST 51,  on outpatient labs 5/23/25.   , AST hemolyzed,  on admission.  Likely iso Tylenol use (3 g per day x 12 days)  - Monitor LFTs QD for now , AST 51,  on outpatient labs 5/23/25.   , AST hemolyzed,  on admission. likely iso intrahepatic cholangiocarcinoma   Likely iso Tylenol use (3 g per day x 12 days)  - Monitor LFTs QD for now

## 2025-05-27 NOTE — PROGRESS NOTE ADULT - SUBJECTIVE AND OBJECTIVE BOX
patient seen and examined at bedside, overall feels well  remains inpatient while trending Na levels and being treated for PNA  pending FELIX pump flush today, patient likely to be discharged home today pending noon Na level, with plans to go directly to [facility] for scheduled FELIX pump flush   patient agreeable to plan         MEDICATIONS  (STANDING):  cefTRIAXone   IVPB 2000 milliGRAM(s) IV Intermittent every 24 hours  diltiazem    Tablet 120 milliGRAM(s) Oral daily  enoxaparin Injectable 40 milliGRAM(s) SubCutaneous every 24 hours  montelukast 10 milliGRAM(s) Oral daily  pantoprazole    Tablet 40 milliGRAM(s) Oral before breakfast  posaconazole DR Tablet 300 milliGRAM(s) Oral daily  sodium chloride 1 Gram(s) Oral every 12 hours    MEDICATIONS  (PRN):  albuterol    90 MICROgram(s) HFA Inhaler 2 Puff(s) Inhalation every 6 hours PRN Shortness of Breath and/or Wheezing  benzocaine/menthol Lozenge 1 Lozenge Oral every 3 hours PRN Cough  benzonatate 100 milliGRAM(s) Oral three times a day PRN Cough  guaiFENesin Oral Liquid (Sugar-Free) 100 milliGRAM(s) Oral every 6 hours PRN Cough  melatonin 3 milliGRAM(s) Oral at bedtime PRN Insomnia      Vital Signs Last 24 Hrs  T(C): 36.7 (27 May 2025 06:16), Max: 37.1 (26 May 2025 20:58)  T(F): 98 (27 May 2025 06:16), Max: 98.7 (26 May 2025 20:58)  HR: 76 (27 May 2025 06:16) (76 - 87)  BP: 130/86 (27 May 2025 06:16) (111/73 - 130/86)  BP(mean): 101 (27 May 2025 06:16) (101 - 101)  RR: 18 (27 May 2025 06:16) (16 - 18)  SpO2: 96% (27 May 2025 06:16) (95% - 96%)    Parameters below as of 27 May 2025 06:16  Patient On (Oxygen Delivery Method): room air      PHYSICAL EXAM:  General: in no acute distress  Lungs: CTA B/L. No wheezes, rales, or rhonchi  Cardiovascular: RRR. No murmurs, rubs, or gallops  Abdomen: Soft, non-tender non-distended; No rebound or guarding  Extremities: WWP, No clubbing, cyanosis or edema  Neurological: Alert and oriented    LABS:                        11.6   13.73 )-----------( 330      ( 27 May 2025 05:30 )             34.5     05-27    127[L]  |  92[L]  |  18  ----------------------------<  116[H]  5.1   |  24  |  0.89    Ca    8.9      27 May 2025 05:30  Phos  4.4     05-27  Mg     2.1     05-27    TPro  6.4  /  Alb  2.7[L]  /  TBili  0.6  /  DBili  x   /  AST  25  /  ALT  78[H]  /  AlkPhos  483[H]  05-26

## 2025-05-27 NOTE — PROGRESS NOTE ADULT - PROBLEM SELECTOR PLAN 4
Heme/Onc consulted for known intrahepatic cholangiocarcinoma. No plans for inpatient chemotherapy at this time  Patient has follow up with his oncologist, Dr. Irvin Hancock on 5/27/25. Heme/Onc consulted for known intrahepatic cholangiocarcinoma. No plans for inpatient chemotherapy at this time  Patient has follow up with his oncologist, Dr. Irvin Hancock on 5/27/25 for his port to be flushed/managed today    no in house service can manage the port. therefore, the patient will be dc today to ensure the port is well managed at [facility]

## 2025-05-27 NOTE — DISCHARGE NOTE NURSING/CASE MANAGEMENT/SOCIAL WORK - PATIENT PORTAL LINK FT
You can access the FollowMyHealth Patient Portal offered by [facility] by registering at the following website: [email or web address]. By joining [facility]’s FollowMyHealth portal, you will also be able to view your health information using other applications (apps) compatible with our system.

## 2025-05-27 NOTE — PROGRESS NOTE ADULT - SUBJECTIVE AND OBJECTIVE BOX
incomplete note    INTERVAL HPI/OVERNIGHT EVENTS: clarisa     SUBJECTIVE: Patient seen and examined at bedside, resting comfortably in bed, and does not appear to be in any acute distress. Patient reports feeling better but admits to 3 episodes of waterry diarrhea within the last 24 hours. States his cough is persistent and is only well managed with hycodan.     Vital Signs Last 24 Hrs  T(C): 36.7 (27 May 2025 06:16), Max: 37.1 (26 May 2025 20:58)  T(F): 98 (27 May 2025 06:16), Max: 98.7 (26 May 2025 20:58)  HR: 76 (27 May 2025 06:16) (76 - 87)  BP: 130/86 (27 May 2025 06:16) (111/73 - 130/86)  BP(mean): 101 (27 May 2025 06:16) (101 - 101)  RR: 18 (27 May 2025 06:16) (18 - 18)  SpO2: 96% (27 May 2025 06:16) (95% - 96%)    Parameters below as of 27 May 2025 06:16  Patient On (Oxygen Delivery Method): room air        PHYSICAL EXAM:  General: in no acute distress  Eyes: EOMI intact bilaterally. Anicteric sclerae, moist conjunctivae  HENT: Moist mucous membranes  Neck: Trachea midline, supple  Lungs: CTA B/L. No wheezes, rales, or rhonchi  Cardiovascular: RRR. No murmurs, rubs, or gallops  Abdomen: Soft, non-tender non-distended; No rebound or guarding  Extremities: WWP, No clubbing, cyanosis or edema  Neurological: Alert and orientedx3.   Skin: Warm and dry. No obvious rash     LABS:                        11.6   13.73 )-----------( 330      ( 27 May 2025 05:30 )             34.5     05-27    127[L]  |  94[L]  |  18  ----------------------------<  115[H]  4.8   |  23  |  0.76    Ca    8.5      27 May 2025 10:00  Phos  4.4     05-27  Mg     2.1     05-27    TPro  6.4  /  Alb  2.7[L]  /  TBili  0.6  /  DBili  x   /  AST  25  /  ALT  78[H]  /  AlkPhos  483[H]  05-26

## 2025-05-27 NOTE — PROGRESS NOTE ADULT - PROBLEM SELECTOR PROBLEM 6
History of pulmonary aspergillosis

## 2025-05-27 NOTE — DISCHARGE NOTE NURSING/CASE MANAGEMENT/SOCIAL WORK - FINANCIAL ASSISTANCE
[facility] provides services at a reduced cost to those who are determined to be eligible through [facility]’s financial assistance program. Information regarding [facility]’s financial assistance program can be found by going to [email or web address] or by calling [phone].

## 2025-05-27 NOTE — PROGRESS NOTE ADULT - NUTRITIONAL ASSESSMENT
This patient has been assessed with a concern for Malnutrition and has been determined to have a diagnosis/diagnoses of Moderate protein-calorie malnutrition and Underweight (BMI < 19).    This patient is being managed with:   Diet Regular-  1500mL Fluid Restriction ([id number])  No Concentrated Potassium  Entered: May 24 2025  7:33PM  
This patient has been assessed with a concern for Malnutrition and has been determined to have a diagnosis/diagnoses of Moderate protein-calorie malnutrition and Underweight (BMI < 19).    This patient is being managed with:   Diet Regular-  1000mL Fluid Restriction ([id number])  No Concentrated Potassium  Entered: May 26 2025 11:36AM

## 2025-05-27 NOTE — DISCHARGE NOTE NURSING/CASE MANAGEMENT/SOCIAL WORK - NSDCPEFALRISK_GEN_ALL_CORE
For information on Fall & Injury Prevention, visit: [email or web address] OR  [email or web address] OR  https://www.cdc.gov/steadi/patient.html

## 2025-05-27 NOTE — PROGRESS NOTE ADULT - PROBLEM SELECTOR PLAN 8
Home reg: Singulair 10 mg QD, Albuterol inhaler, Arnuity Ellipta  - C/w home regimen

## 2025-05-27 NOTE — DISCHARGE NOTE PROVIDER - ATTENDING DISCHARGE PHYSICAL EXAMINATION:
NAD, sitting up in bed   ncat, mmm  rrr, normal s1s2  ctab, no wheeze  ntnd abdomen  wwp, nontender no edema  speech fluent, no facial asymmetry    - C diff and Gi CPR negative, diarrhea likely in setting of antibiotics  - sodium stable at 127., can discharge today with fluids restriction to 1L and salt tabs as above  - discussed with Dr Hancock to continue with salt tabs and fluid restriction for at least a week, and then follow up labs, and then decide on further care (liberalizing fluids vs adjusting salt tabs/discontinuing salt tabs, all depends on repeat labs outpatient )  - send with cefpodoxime 200bid for pneumonia, duration listed above  - sent some hycodan cough syrup to [facility] - patient reports that this was very helpful at bedtime  questions answered  can be discharged today to [facility] for his special port to be flushed NAD, sitting up in bed   ncat, mmm  rrr, normal s1s2  ctab, no wheeze  ntnd abdomen  wwp, nontender no edema  speech fluent, no facial asymmetry    - C diff and Gi CPR negative, diarrhea likely in setting of antibiotics  - sodium stable at 127., can discharge today with fluids restriction to 1L and salt tabs as above  - discussed with Dr Hancock to continue with salt tabs and fluid restriction for at least a week, and then follow up labs, and then decide on further care (liberalizing fluids vs adjusting salt tabs/discontinuing salt tabs, all depends on repeat labs outpatient )  - send with cefpodoxime 200bid for pneumonia, duration listed above  - sent some hycodan cough syrup to [facility] - patient reports that this was very helpful at bedtime  questions answered  can be discharged today to [facility] for his special port to be flushed    Time spent on the encounter excludes teaching and separately reported services NAD, sitting up in bed   ncat, mmm  rrr, normal s1s2  ctab, no wheeze  ntnd abdomen  wwp, nontender no edema  speech fluent, no facial asymmetry  sepsis ruled out      - C diff and Gi CPR negative, diarrhea likely in setting of antibiotics  - sodium stable at 127., can discharge today with fluids restriction to 1L and salt tabs as above  - discussed with Dr Hancock to continue with salt tabs and fluid restriction for at least a week, and then follow up labs, and then decide on further care (liberalizing fluids vs adjusting salt tabs/discontinuing salt tabs, all depends on repeat labs outpatient )  - send with cefpodoxime 200bid for pneumonia, duration listed above  - sent some hycodan cough syrup to [facility] - patient reports that this was very helpful at bedtime  questions answered  can be discharged today to [facility] for his special port to be flushed    Time spent on the encounter excludes teaching and separately reported services

## 2025-05-27 NOTE — DISCHARGE NOTE PROVIDER - NSDCMRMEDTOKEN_GEN_ALL_CORE_FT
acetaminophen 325 mg oral tablet: 3 tab(s) orally every 6 hours  albuterol 90 mcg/inh inhalation aerosol: 2 puff(s) inhaled every 6 hours As needed Shortness of Breath and/or Wheezing  Arnuity Ellipta 100 mcg/inh inhalation powder: 1 inhaled once a day  ascorbic acid: once a day  atorvastatin 10 mg oral tablet: 1 tab(s) orally once a day  azelastine-fluticasone 137 mcg-50 mcg/inh nasal spray: 1 spray(s) intranasally once a day  CoQ10: 1 cap orally once a day  dilTIAZem 120 mg/24 hours oral capsule, extended release: 1 cap(s) orally once a day  famotidine 20 mg oral tablet: 2 tab(s) orally once a day  hydrocortisone 2.5% topical cream: 1 Apply topically to affected area 2 times a day  ibuprofen 200 mg oral tablet: 1 tab(s) orally every 6 hours Alternate with tylenol for pain  magnesium: 1 cap orally once a day  montelukast 10 mg oral tablet: 1 tab(s) orally once a day  oxyCODONE 5 mg oral tablet: 1 tab(s) orally every 6 hours as needed for  severe pain for severe pain not controlled by tylenol and ibuprofen MDD: 20mg (4 tabs)  pantoprazole 40 mg oral delayed release tablet: 1 tab(s) orally once a day (at bedtime)  polyethylene glycol 3350 oral powder for reconstitution: 17 gram(s) orally once a day as needed for  constipation  riboflavin 400 mg oral tablet: 1 tab(s) orally  vitamin B12: 1 cap orally once a day   acetaminophen 325 mg oral tablet: 3 tab(s) orally every 6 hours  albuterol 90 mcg/inh inhalation aerosol: 2 puff(s) inhaled every 6 hours As needed Shortness of Breath and/or Wheezing  Arnuity Ellipta 100 mcg/inh inhalation powder: 1 inhaled once a day  ascorbic acid: once a day  atorvastatin 10 mg oral tablet: 1 tab(s) orally once a day  azelastine-fluticasone 137 mcg-50 mcg/inh nasal spray: 1 spray(s) intranasally once a day  CoQ10: 1 cap orally once a day  dilTIAZem 120 mg/24 hours oral capsule, extended release: 1 cap(s) orally once a day  famotidine 20 mg oral tablet: 2 tab(s) orally once a day  Hycodan 5 mg-1.5 mg/5 mL oral syrup: 5 milliliter(s) orally once a day (at bedtime) as needed for  cough MDD: 5ml  hydrocortisone 2.5% topical cream: 1 Apply topically to affected area 2 times a day  ibuprofen 200 mg oral tablet: 1 tab(s) orally every 6 hours Alternate with tylenol for pain  magnesium: 1 cap orally once a day  montelukast 10 mg oral tablet: 1 tab(s) orally once a day  oxyCODONE 5 mg oral tablet: 1 tab(s) orally every 6 hours as needed for  severe pain for severe pain not controlled by tylenol and ibuprofen MDD: 20mg (4 tabs)  pantoprazole 40 mg oral delayed release tablet: 1 tab(s) orally once a day (at bedtime)  polyethylene glycol 3350 oral powder for reconstitution: 17 gram(s) orally once a day as needed for  constipation  riboflavin 400 mg oral tablet: 1 tab(s) orally  vitamin B12: 1 cap orally once a day   albuterol 90 mcg/inh inhalation aerosol: 2 puff(s) inhaled every 6 hours As needed Shortness of Breath and/or Wheezing  Arnuity Ellipta 100 mcg/inh inhalation powder: 1 inhaled once a day  ascorbic acid: once a day  atorvastatin 10 mg oral tablet: 1 tab(s) orally once a day  azelastine-fluticasone 137 mcg-50 mcg/inh nasal spray: 1 spray(s) intranasally once a day  cefpodoxime 200 mg oral tablet: 1 tab(s) orally every 12 hours Please take your first dose in the AM of 5/28/2025 and final dose in PM of 5/29/2025.  dilTIAZem 120 mg/24 hours oral capsule, extended release: 1 cap(s) orally once a day  famotidine 20 mg oral tablet: 2 tab(s) orally once a day  Hycodan 5 mg-1.5 mg/5 mL oral syrup: 5 milliliter(s) orally once a day (at bedtime) as needed for  cough MDD: 5ml  magnesium gluconate: 500 milligram(s) orally once a day  montelukast 10 mg oral tablet: 1 tab(s) orally once a day  pantoprazole 40 mg oral delayed release tablet: 1 tab(s) orally once a day (at bedtime)  posaconazole 100 mg oral delayed release tablet: 3 tab(s) orally once a day  riboflavin 100 mg oral capsule: 1 cap(s) orally once a day  Sodium Chloride 1 g oral tablet: 1 tab(s) orally every 12 hours

## 2025-05-27 NOTE — DISCHARGE NOTE PROVIDER - NSDCFUADDAPPT_GEN_ALL_CORE_FT
Please bring your Insurance card, Photo ID and Discharge paperwork to the following appointment:    (1) Please follow up with [facility] with Dr. Abena Alvarez on behalf of Dr. Kalina Hicks at [address] [place] [address], [address], [place] [address] on 6/3/2025 at 12:00pm.    Appointment was scheduled by Ms. CECILIA Barriga, Referral Coordinator.

## 2025-05-27 NOTE — PROGRESS NOTE ADULT - PROBLEM SELECTOR PLAN 7
-c/w home Atorvastatin 10 mg

## 2025-05-27 NOTE — PROGRESS NOTE ADULT - PROBLEM SELECTOR PLAN 1
Pt does not meets criteria for SIRS/Sepsis (1/4 for leukocytosis), however has PNA as source of infection on imaging.   Of note fevers may have been masked by Tylenol use at home.   Also found to have lactic acidosis; now resolved s/p 3 L NS in the ED.   Urine strep and legionella negative, full RVP negative.   Given 2 wks of symptoms without improvement, likely viral PNA with superimposed bacterial PNA, favor covering for CAP in immunocompromised patient.   - Continue with CTX 2 g   - C/w Posaconazole 300mg qd, ID approval obtained 5/26   - f/u MRSA swab  - f/u sputum cultures; GPR and GPC present   - f/u blood cultures Pt does not meets criteria for SIRS/Sepsis (1/4 for leukocytosis), however has PNA as source of infection on imaging.   Of note fevers may have been masked by Tylenol use at home.   Also found to have lactic acidosis; now resolved s/p 3 L NS in the ED.   Urine strep and legionella negative, full RVP negative.   Given 2 wks of symptoms without improvement, likely viral PNA with superimposed bacterial PNA, favor covering for CAP in immunocompromised patient.     - Continue with CTX 2 g. dc today with 3 days cefpodoxime   - C/w Posaconazole 300mg qd, ID approval obtained 5/26. refill on dc today Pt does not meets criteria for SIRS/Sepsis (1/4 for leukocytosis), however has PNA as source of infection on imaging.   Of note fevers may have been masked by Tylenol use at home.   Also found to have lactic acidosis; now resolved s/p 3 L NS in the ED.   Urine strep and legionella negative, full RVP negative.   Given 2 wks of symptoms without improvement, likely viral PNA with superimposed bacterial PNA, favor covering for CAP in immunocompromised patient.     - Continue with CTX 2 g. dc today with 2 days cefpodoxime 200mg BID   - C/w Posaconazole 300mg qd, ID approval obtained 5/26. refill on dc today

## 2025-05-27 NOTE — DISCHARGE NOTE PROVIDER - NSDCFUSCHEDAPPT_GEN_ALL_CORE_FT
Nick Hancock  [facility]  HEMONC [address]  Scheduled Appointment: 05/27/2025    [facility]  AMBC[facility]MO  [address]  Scheduled Appointment: 05/27/2025    Nick Hancock  [facility]  HEMONC [address]  Scheduled Appointment: 06/09/2025    [facility]  AMBCHEMO  [address]  Scheduled Appointment: 06/09/2025    [facility]  AMBCHEMO  [address]  Scheduled Appointment: 06/23/2025     Nick Hancock  [facility]  HEMONC [address]  Scheduled Appointment: 06/09/2025    [facility]  AMBCHEMO  [address]  Scheduled Appointment: 06/09/2025    [facility]  AMBCHEMO  [address]  Scheduled Appointment: 06/23/2025     [facility]  INTMED [address]  Scheduled Appointment: 06/03/2025    Nick Hancock  [facility]  HEMONC [address]  Scheduled Appointment: 06/09/2025    [facility]  AMBCHEMO  [address]  Scheduled Appointment: 06/09/2025    [facility]  AMBCHEMO  [address]  Scheduled Appointment: 06/23/2025     Yaima Valladares  [facility]  HEMONC [address]  Scheduled Appointment: 06/05/2025    Nick Hancock  [facility]  HEMONC [address]  Scheduled Appointment: 06/09/2025    [facility]  AMBCHEMO  [address]  Scheduled Appointment: 06/09/2025    Nick Hancock  [facility] PreAdmits  Scheduled Appointment: 06/09/2025    [facility]  AMBCHEMO  [address]  Scheduled Appointment: 06/23/2025

## 2025-05-27 NOTE — DISCHARGE NOTE PROVIDER - DETAILS OF MALNUTRITION DIAGNOSIS/DIAGNOSES
This patient has been assessed with a concern for Malnutrition and was treated during this hospitalization for the following Nutrition diagnosis/diagnoses:     -  05/25/2025: Moderate protein-calorie malnutrition   -  05/25/2025: Underweight (BMI < 19)

## 2025-05-27 NOTE — PROGRESS NOTE ADULT - PROBLEM SELECTOR PLAN 6
Aspergillosis diagnosed in the context of cavitating pulmonary lesions, treated with  4 months of Posaconazole in 3/23 by Dr. Bland at [facility]. Patient currently takes Posaconazole 300 mg QD for ppx of invasive fungal infections  - C/w with Posaconazole 300 mg QD Aspergillosis diagnosed in the context of cavitating pulmonary lesions, treated with  4 months of Posaconazole in 3/23 by Dr. Bland at [place]. Patient currently takes Posaconazole 300 mg QD for ppx of invasive fungal infections  - C/w with Posaconazole 300 mg QD. refill on dc

## 2025-05-27 NOTE — PROGRESS NOTE ADULT - PROBLEM/PLAN-9
Dr. Florentino Galloway at bedside
DISPLAY PLAN FREE TEXT

## 2025-05-28 ENCOUNTER — TRANSCRIPTION ENCOUNTER (OUTPATIENT)
Age: 70
End: 2025-05-28

## 2025-05-28 PROBLEM — E87.1 HYPONATREMIA: Status: ACTIVE | Noted: 2025-05-28

## 2025-05-29 LAB
CULTURE RESULTS: SIGNIFICANT CHANGE UP
CULTURE RESULTS: SIGNIFICANT CHANGE UP
SPECIMEN SOURCE: SIGNIFICANT CHANGE UP
SPECIMEN SOURCE: SIGNIFICANT CHANGE UP

## 2025-05-30 ENCOUNTER — APPOINTMENT (OUTPATIENT)
Dept: HEMATOLOGY ONCOLOGY | Facility: CLINIC | Age: 70
End: 2025-05-30

## 2025-06-01 LAB
ALBUMIN SERPL ELPH-MCNC: 2.6 G/DL
ALP BLD-CCNC: 297 U/L
ALT SERPL-CCNC: 69 U/L
ANION GAP SERPL CALC-SCNC: 0 MMOL/L
AST SERPL-CCNC: 39 U/L
BILIRUB SERPL-MCNC: 0.8 MG/DL
BUN SERPL-MCNC: 23 MG/DL
CALCIUM SERPL-MCNC: 8.5 MG/DL
CHLORIDE SERPL-SCNC: 105 MMOL/L
CO2 SERPL-SCNC: 27 MMOL/L
CREAT SERPL-MCNC: 1 MG/DL
EGFRCR SERPLBLD CKD-EPI 2021: 82 ML/MIN/1.73M2
GLUCOSE SERPL-MCNC: 109 MG/DL
HCT VFR BLD CALC: 34 %
HGB BLD-MCNC: 11.7 G/DL
LYMPHOCYTES # BLD AUTO: 1.4 K/UL
LYMPHOCYTES NFR BLD AUTO: 10.9 %
MAN DIFF?: NO
MCHC RBC-ENTMCNC: 33.1 PG
MCHC RBC-ENTMCNC: 34.4 G/DL
MCV RBC AUTO: 96 FL
NEUTROPHILS # BLD AUTO: 10.5 K/UL
NEUTROPHILS NFR BLD AUTO: 81.8 %
PLATELET # BLD AUTO: 423 K/UL
POTASSIUM SERPL-SCNC: 5.1 MMOL/L
PROT SERPL-MCNC: 6.8 G/DL
RBC # BLD: 3.54 M/UL
RBC # FLD: 13.8 %
SODIUM SERPL-SCNC: 132 MMOL/L
WBC # FLD AUTO: 12.8 K/UL

## 2025-06-02 ENCOUNTER — APPOINTMENT (OUTPATIENT)
Dept: INFUSION THERAPY | Facility: CLINIC | Age: 70
End: 2025-06-02

## 2025-06-02 ENCOUNTER — APPOINTMENT (OUTPATIENT)
Dept: CARE COORDINATION | Facility: HOME HEALTH | Age: 70
End: 2025-06-02
Payer: MEDICARE

## 2025-06-02 DIAGNOSIS — I10 ESSENTIAL (PRIMARY) HYPERTENSION: ICD-10-CM

## 2025-06-02 DIAGNOSIS — Z87.09 PERSONAL HISTORY OF OTHER DISEASES OF THE RESPIRATORY SYSTEM: ICD-10-CM

## 2025-06-02 DIAGNOSIS — C22.1 INTRAHEPATIC BILE DUCT CARCINOMA: ICD-10-CM

## 2025-06-02 DIAGNOSIS — E78.5 HYPERLIPIDEMIA, UNSPECIFIED: ICD-10-CM

## 2025-06-02 DIAGNOSIS — K21.00 GASTRO-ESOPHAGEAL REFLUX DISEASE WITH ESOPHAGITIS, WITHOUT BLEEDING: ICD-10-CM

## 2025-06-02 PROCEDURE — 99349 HOME/RES VST EST MOD MDM 40: CPT | Mod: 2W

## 2025-06-02 RX ORDER — DEXTROMETHORPHAN HBR. AND GUAIFENESIN 20; 200 MG/10ML; MG/10ML
10-100 SOLUTION ORAL EVERY 4 HOURS
Qty: 100 | Refills: 0 | Status: ACTIVE | COMMUNITY
Start: 2025-06-02 | End: 1900-01-01

## 2025-06-02 RX ORDER — FAMOTIDINE 20 MG/1
20 TABLET, FILM COATED ORAL
Qty: 180 | Refills: 1 | Status: ACTIVE | COMMUNITY
Start: 2025-06-02 | End: 1900-01-01

## 2025-06-03 ENCOUNTER — APPOINTMENT (OUTPATIENT)
Dept: INTERNAL MEDICINE | Facility: CLINIC | Age: 70
End: 2025-06-03
Payer: MEDICARE

## 2025-06-03 VITALS
BODY MASS INDEX: 18.82 KG/M2 | TEMPERATURE: 97.3 F | OXYGEN SATURATION: 98 % | HEART RATE: 89 BPM | SYSTOLIC BLOOD PRESSURE: 113 MMHG | HEIGHT: 73 IN | WEIGHT: 142 LBS | DIASTOLIC BLOOD PRESSURE: 79 MMHG

## 2025-06-03 DIAGNOSIS — J18.9 PNEUMONIA, UNSPECIFIED ORGANISM: ICD-10-CM

## 2025-06-03 DIAGNOSIS — E87.1 HYPO-OSMOLALITY AND HYPONATREMIA: ICD-10-CM

## 2025-06-03 PROCEDURE — 99213 OFFICE O/P EST LOW 20 MIN: CPT | Mod: GC

## 2025-06-03 PROCEDURE — G2211 COMPLEX E/M VISIT ADD ON: CPT

## 2025-06-05 ENCOUNTER — APPOINTMENT (OUTPATIENT)
Dept: HEMATOLOGY ONCOLOGY | Facility: CLINIC | Age: 70
End: 2025-06-05

## 2025-06-06 DIAGNOSIS — R74.01 ELEVATION OF LEVELS OF LIVER TRANSAMINASE LEVELS: ICD-10-CM

## 2025-06-06 DIAGNOSIS — E78.5 HYPERLIPIDEMIA, UNSPECIFIED: ICD-10-CM

## 2025-06-06 DIAGNOSIS — E22.2 SYNDROME OF INAPPROPRIATE SECRETION OF ANTIDIURETIC HORMONE: ICD-10-CM

## 2025-06-06 DIAGNOSIS — G43.909 MIGRAINE, UNSPECIFIED, NOT INTRACTABLE, WITHOUT STATUS MIGRAINOSUS: ICD-10-CM

## 2025-06-06 DIAGNOSIS — D84.9 IMMUNODEFICIENCY, UNSPECIFIED: ICD-10-CM

## 2025-06-06 DIAGNOSIS — E87.20 ACIDOSIS, UNSPECIFIED: ICD-10-CM

## 2025-06-06 DIAGNOSIS — J18.9 PNEUMONIA, UNSPECIFIED ORGANISM: ICD-10-CM

## 2025-06-06 DIAGNOSIS — Z85.46 PERSONAL HISTORY OF MALIGNANT NEOPLASM OF PROSTATE: ICD-10-CM

## 2025-06-06 DIAGNOSIS — Z88.6 ALLERGY STATUS TO ANALGESIC AGENT: ICD-10-CM

## 2025-06-06 DIAGNOSIS — Z86.19 PERSONAL HISTORY OF OTHER INFECTIOUS AND PARASITIC DISEASES: ICD-10-CM

## 2025-06-06 DIAGNOSIS — E44.0 MODERATE PROTEIN-CALORIE MALNUTRITION: ICD-10-CM

## 2025-06-06 DIAGNOSIS — J12.9 VIRAL PNEUMONIA, UNSPECIFIED: ICD-10-CM

## 2025-06-06 DIAGNOSIS — Z86.79 PERSONAL HISTORY OF OTHER DISEASES OF THE CIRCULATORY SYSTEM: ICD-10-CM

## 2025-06-06 DIAGNOSIS — Z92.3 PERSONAL HISTORY OF IRRADIATION: ICD-10-CM

## 2025-06-06 DIAGNOSIS — Z91.018 ALLERGY TO OTHER FOODS: ICD-10-CM

## 2025-06-06 DIAGNOSIS — C22.1 INTRAHEPATIC BILE DUCT CARCINOMA: ICD-10-CM

## 2025-06-06 DIAGNOSIS — J45.20 MILD INTERMITTENT ASTHMA, UNCOMPLICATED: ICD-10-CM

## 2025-06-06 DIAGNOSIS — J15.9 UNSPECIFIED BACTERIAL PNEUMONIA: ICD-10-CM

## 2025-06-06 DIAGNOSIS — R74.8 ABNORMAL LEVELS OF OTHER SERUM ENZYMES: ICD-10-CM

## 2025-06-06 DIAGNOSIS — K21.9 GASTRO-ESOPHAGEAL REFLUX DISEASE WITHOUT ESOPHAGITIS: ICD-10-CM

## 2025-06-06 DIAGNOSIS — Z91.010 ALLERGY TO PEANUTS: ICD-10-CM

## 2025-06-06 DIAGNOSIS — R00.0 TACHYCARDIA, UNSPECIFIED: ICD-10-CM

## 2025-06-06 DIAGNOSIS — Z88.0 ALLERGY STATUS TO PENICILLIN: ICD-10-CM

## 2025-06-09 ENCOUNTER — NON-APPOINTMENT (OUTPATIENT)
Age: 70
End: 2025-06-09

## 2025-06-09 ENCOUNTER — OUTPATIENT (OUTPATIENT)
Dept: OUTPATIENT SERVICES | Facility: HOSPITAL | Age: 70
LOS: 1 days | End: 2025-06-09
Payer: MEDICARE

## 2025-06-09 ENCOUNTER — APPOINTMENT (OUTPATIENT)
Dept: HEMATOLOGY ONCOLOGY | Facility: CLINIC | Age: 70
End: 2025-06-09
Payer: MEDICARE

## 2025-06-09 ENCOUNTER — APPOINTMENT (OUTPATIENT)
Dept: INFUSION THERAPY | Facility: CLINIC | Age: 70
End: 2025-06-09

## 2025-06-09 VITALS
TEMPERATURE: 97.7 F | DIASTOLIC BLOOD PRESSURE: 94 MMHG | BODY MASS INDEX: 19.09 KG/M2 | HEART RATE: 81 BPM | RESPIRATION RATE: 18 BRPM | OXYGEN SATURATION: 98 % | SYSTOLIC BLOOD PRESSURE: 130 MMHG | WEIGHT: 144 LBS | HEIGHT: 73 IN

## 2025-06-09 VITALS
HEART RATE: 79 BPM | WEIGHT: 143.96 LBS | HEIGHT: 72 IN | DIASTOLIC BLOOD PRESSURE: 82 MMHG | RESPIRATION RATE: 18 BRPM | TEMPERATURE: 97 F | OXYGEN SATURATION: 98 % | SYSTOLIC BLOOD PRESSURE: 118 MMHG

## 2025-06-09 DIAGNOSIS — Z98.890 OTHER SPECIFIED POSTPROCEDURAL STATES: Chronic | ICD-10-CM

## 2025-06-09 DIAGNOSIS — Z92.3 PERSONAL HISTORY OF IRRADIATION: Chronic | ICD-10-CM

## 2025-06-09 DIAGNOSIS — C22.1 INTRAHEPATIC BILE DUCT CARCINOMA: ICD-10-CM

## 2025-06-09 LAB
ALBUMIN SERPL ELPH-MCNC: 2.9 G/DL
ALP BLD-CCNC: 188 U/L
ALT SERPL-CCNC: 73 U/L
ANION GAP SERPL CALC-SCNC: 1 MMOL/L
AST SERPL-CCNC: 37 U/L
BILIRUB SERPL-MCNC: 0.7 MG/DL
BUN SERPL-MCNC: 20 MG/DL
CALCIUM SERPL-MCNC: 8.9 MG/DL
CHLORIDE SERPL-SCNC: 106 MMOL/L
CO2 SERPL-SCNC: 27 MMOL/L
CREAT SERPL-MCNC: 1.1 MG/DL
EGFRCR SERPLBLD CKD-EPI 2021: 73 ML/MIN/1.73M2
GLUCOSE SERPL-MCNC: 112 MG/DL
HCT VFR BLD CALC: 33.6 %
HGB BLD-MCNC: 11.5 G/DL
LYMPHOCYTES # BLD AUTO: 1.4 K/UL
LYMPHOCYTES NFR BLD AUTO: 13.4 %
MAGNESIUM SERPL-MCNC: 2 MG/DL
MAN DIFF?: NO
MCHC RBC-ENTMCNC: 33 PG
MCHC RBC-ENTMCNC: 34.2 G/DL
MCV RBC AUTO: 96.3 FL
NEUTROPHILS # BLD AUTO: 8.1 K/UL
NEUTROPHILS NFR BLD AUTO: 78.6 %
PHOSPHATE SERPL-MCNC: 4.2 MG/DL
PLATELET # BLD AUTO: 396 K/UL
POTASSIUM SERPL-SCNC: 4.9 MMOL/L
PROT SERPL-MCNC: 6.8 G/DL
RBC # BLD: 3.49 M/UL
RBC # FLD: 14 %
SODIUM SERPL-SCNC: 134 MMOL/L
WBC # FLD AUTO: 10.3 K/UL

## 2025-06-09 PROCEDURE — 96367 TX/PROPH/DG ADDL SEQ IV INF: CPT

## 2025-06-09 PROCEDURE — 96375 TX/PRO/DX INJ NEW DRUG ADDON: CPT

## 2025-06-09 PROCEDURE — 96417 CHEMO IV INFUS EACH ADDL SEQ: CPT

## 2025-06-09 PROCEDURE — 96413 CHEMO IV INFUSION 1 HR: CPT

## 2025-06-09 PROCEDURE — 96415 CHEMO IV INFUSION ADDL HR: CPT

## 2025-06-09 PROCEDURE — 99215 OFFICE O/P EST HI 40 MIN: CPT

## 2025-06-09 RX ORDER — LIDOCAINE HYDROCHLORIDE 20 MG/ML
1 JELLY TOPICAL ONCE
Refills: 0 | Status: COMPLETED | OUTPATIENT
Start: 2025-06-09 | End: 2025-06-09

## 2025-06-09 RX ORDER — FOSAPREPITANT 150 MG/5ML
150 INJECTION, POWDER, LYOPHILIZED, FOR SOLUTION INTRAVENOUS ONCE
Refills: 0 | Status: COMPLETED | OUTPATIENT
Start: 2025-06-09 | End: 2025-06-09

## 2025-06-09 RX ORDER — GEMCITABINE HYDROCHLORIDE 38 MG/ML
1500 INJECTION, SOLUTION INTRAVENOUS ONCE
Refills: 0 | Status: COMPLETED | OUTPATIENT
Start: 2025-06-09 | End: 2025-06-09

## 2025-06-09 RX ORDER — OXALIPLATIN 5 MG/ML
110 INJECTION, SOLUTION, CONCENTRATE INTRAVENOUS ONCE
Refills: 0 | Status: COMPLETED | OUTPATIENT
Start: 2025-06-09 | End: 2025-06-09

## 2025-06-09 RX ORDER — DEXAMETHASONE 0.5 MG/1
10 TABLET ORAL ONCE
Refills: 0 | Status: COMPLETED | OUTPATIENT
Start: 2025-06-09 | End: 2025-06-09

## 2025-06-09 RX ORDER — PALONOSETRON HYDROCHLORIDE 0.05 MG/ML
0.25 INJECTION, SOLUTION INTRAVENOUS ONCE
Refills: 0 | Status: COMPLETED | OUTPATIENT
Start: 2025-06-09 | End: 2025-06-09

## 2025-06-09 RX ADMIN — FOSAPREPITANT 150 MILLIGRAM(S): 150 INJECTION, POWDER, LYOPHILIZED, FOR SOLUTION INTRAVENOUS at 11:48

## 2025-06-09 RX ADMIN — PALONOSETRON HYDROCHLORIDE 0.25 MILLIGRAM(S): 0.05 INJECTION, SOLUTION INTRAVENOUS at 11:54

## 2025-06-09 RX ADMIN — DEXAMETHASONE 204 MILLIGRAM(S): 0.5 TABLET ORAL at 10:58

## 2025-06-09 RX ADMIN — OXALIPLATIN 110 MILLIGRAM(S): 5 INJECTION, SOLUTION, CONCENTRATE INTRAVENOUS at 13:55

## 2025-06-09 RX ADMIN — OXALIPLATIN 110 MILLIGRAM(S): 5 INJECTION, SOLUTION, CONCENTRATE INTRAVENOUS at 11:55

## 2025-06-09 RX ADMIN — DEXAMETHASONE 10 MILLIGRAM(S): 0.5 TABLET ORAL at 11:13

## 2025-06-09 RX ADMIN — GEMCITABINE HYDROCHLORIDE 1500 MILLIGRAM(S): 38 INJECTION, SOLUTION INTRAVENOUS at 11:58

## 2025-06-09 RX ADMIN — FOSAPREPITANT 500 MILLIGRAM(S): 150 INJECTION, POWDER, LYOPHILIZED, FOR SOLUTION INTRAVENOUS at 11:17

## 2025-06-09 RX ADMIN — Medication 10 MILLILITER(S): at 15:05

## 2025-06-09 RX ADMIN — GEMCITABINE HYDROCHLORIDE 1500 MILLIGRAM(S): 38 INJECTION, SOLUTION INTRAVENOUS at 14:30

## 2025-06-12 ENCOUNTER — TRANSCRIPTION ENCOUNTER (OUTPATIENT)
Age: 70
End: 2025-06-12

## 2025-06-14 ENCOUNTER — EMERGENCY (EMERGENCY)
Facility: HOSPITAL | Age: 70
LOS: 1 days | End: 2025-06-14
Attending: STUDENT IN AN ORGANIZED HEALTH CARE EDUCATION/TRAINING PROGRAM | Admitting: STUDENT IN AN ORGANIZED HEALTH CARE EDUCATION/TRAINING PROGRAM
Payer: MEDICARE

## 2025-06-14 VITALS
HEIGHT: 72 IN | SYSTOLIC BLOOD PRESSURE: 124 MMHG | TEMPERATURE: 98 F | OXYGEN SATURATION: 98 % | DIASTOLIC BLOOD PRESSURE: 79 MMHG | RESPIRATION RATE: 16 BRPM | WEIGHT: 147.93 LBS | HEART RATE: 76 BPM

## 2025-06-14 VITALS
TEMPERATURE: 98 F | DIASTOLIC BLOOD PRESSURE: 78 MMHG | OXYGEN SATURATION: 99 % | SYSTOLIC BLOOD PRESSURE: 121 MMHG | HEART RATE: 74 BPM | RESPIRATION RATE: 16 BRPM

## 2025-06-14 DIAGNOSIS — Z98.890 OTHER SPECIFIED POSTPROCEDURAL STATES: Chronic | ICD-10-CM

## 2025-06-14 DIAGNOSIS — Z92.21 PERSONAL HISTORY OF ANTINEOPLASTIC CHEMOTHERAPY: Chronic | ICD-10-CM

## 2025-06-14 DIAGNOSIS — Z92.3 PERSONAL HISTORY OF IRRADIATION: Chronic | ICD-10-CM

## 2025-06-14 PROCEDURE — 93971 EXTREMITY STUDY: CPT

## 2025-06-14 PROCEDURE — 93971 EXTREMITY STUDY: CPT | Mod: 26,RT

## 2025-06-14 PROCEDURE — 99284 EMERGENCY DEPT VISIT MOD MDM: CPT

## 2025-06-14 PROCEDURE — 99284 EMERGENCY DEPT VISIT MOD MDM: CPT | Mod: 25

## 2025-06-14 NOTE — ED PROVIDER NOTE - PATIENT PORTAL LINK FT
You can access the FollowMyHealth Patient Portal offered by [facility] by registering at the following website: [email or web address]. By joining [facility]’s FollowMyHealth portal, you will also be able to view your health information using other applications (apps) compatible with our system.

## 2025-06-14 NOTE — ED ADULT NURSE NOTE - NSFALLHARMRISKINTERV_ED_ALL_ED

## 2025-06-14 NOTE — ED PROVIDER NOTE - CLINICAL SUMMARY MEDICAL DECISION MAKING FREE TEXT BOX
69 y/o male PMHx of  cholangiocarcinoma  s/p chemo (right chest chemo port), ex lap, hepatic artery infusion pump (HAIP), celiac LN Bx and girish on 4/2/25, invasive aspergillosis s/p 4 months of Posaconazole 3/23, ocular toxoplasmosis,  prostate cancer s/p radiation in 2014, HTN, HLD, aortic aneurysm, colonic polyps, asthma (mild intermittent), GERD with reflux esophagitis, recent admission for bilateral pneumonia. Sent in by [facility] today for RLE duplex. Pt with 1 week of RLE pain around cluster of veins in shin. Denies chest pain, shortness of breath.     No c/f PE, no cp, sob,  hypoxia or tachycardia.  Plan for duplex to eval for DVT, likely varicose vein.

## 2025-06-14 NOTE — ED PROVIDER NOTE - MUSCULOSKELETAL, MLM
cluster of varicose veins right anterior shin, mildly tender. otherwise no calf swelling or tenderness. DP palpable, SILT

## 2025-06-14 NOTE — ED ADULT NURSE NOTE - ISAR SCREEN YN
Lipid Panel:  Lab Results   Component Value Date    CHOL 124 07/17/2024    HDL 46 07/17/2024    LDLDIRECT 67.6 07/17/2024    TRIG 78 07/17/2024    TOTALCHOLEST 4 06/20/2022     LDL at goal; continue rosuvastatin   No

## 2025-06-14 NOTE — ED PROVIDER NOTE - OBJECTIVE STATEMENT
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 Sent in [facility] today for RLE duplex. Pt with 1 week of RLE pain around cluster of veins in shin. Denies chest pain, shortness of breath.

## 2025-06-14 NOTE — ED CLERICAL - NS ED CARE COORDINATION INFORMATION
This patient is enrolled in the COPD/PNA STARs readmission reduction program and has active care navigation. This patient can be followed up by the care navigation team within 24 hours. To arrange close follow-up or to obtain additional clinical information about this patient.     Please call the contact number above and please contact the on call pulmonary fellow (Long range pager is [phone]).

## 2025-06-14 NOTE — ED PROVIDER NOTE - NSFOLLOWUPINSTRUCTIONS_ED_ALL_ED_FT
Follow up with vascular surgery.    Varicose Veins  Varicose veins are veins that have become enlarged, bulged, and twisted. They most often appear in the legs.    What are the causes?  This condition is caused by damage to the valves in the vein. These valves help blood return to your heart. When they are damaged and they stop working properly, blood may flow backward and back up in the veins near the skin, causing the veins to get larger and appear twisted.    The condition can result from any issue that causes blood to back up, like pregnancy, prolonged standing, or obesity.    What increases the risk?  The following factors may make you more likely to develop this condition:  Being on your feet a lot.  Being pregnant.  Being overweight.  Smoking.  Having had a previous deep vein thrombosis or having a thrombotic disorder.  Aging. The risk increases with age.  Having a condition called Klippel–Trenaunay syndrome.  What are the signs or symptoms?  Symptoms of this condition include:  Bulging, twisted, and bluish veins.  A feeling of heaviness in your legs. This may be worse at the end of the day.  Leg pain. This may be worse at the end of the day.  Swelling in the leg.  Changes in skin color over the veins.  Swelling or pain in the legs can limit your activities. Your symptoms may get worse when you sit or stand for long periods of time.    How is this diagnosed?  This condition may be diagnosed based on:  Your symptoms, family history, activity levels, and lifestyle.  A physical exam.  You may also have tests, including an ultrasound or X-ray.    How is this treated?  Treatment for this condition may involve:  Avoiding sitting or standing in one position for long periods of time.  Wearing compression stockings. These stockings help to prevent blood clots and reduce swelling in the legs.  Raising (elevating) the legs when resting.  Losing weight.  Exercising regularly.  If you have persistent symptoms or want to improve the way your varicose veins look, you may choose to have a procedure to close the varicose veins off or to remove them.    Nonsurgical treatments to close off the veins include:  Sclerotherapy. In this treatment, a solution is injected into a vein to close it off.  Laser treatment. The vein is heated with a laser to close it off.  Radiofrequency vein ablation. An electrical current produced by radio waves is used to close off the vein.  Surgical treatments to remove the veins include:  Phlebectomy. In this procedure, the veins are removed through small incisions made over the veins.  Vein ligation and stripping. In this procedure, incisions are made over the veins. The veins are then removed after being tied (ligated) with stitches (sutures).  Follow these instructions at home:  Medicines    Take over-the-counter and prescription medicines only as told by your health care provider.  If you were prescribed an antibiotic medicine, use it as told by your health care provider. Do not stop using the antibiotic even if you start to feel better.  Activity    Walk as much as possible. Walking increases blood flow. This helps blood return to the heart and takes pressure off your veins.  Do not stand or sit in one position for a long period of time.  Do not sit with your legs crossed.  Avoid sitting for a long time without moving. Get up to take short walks every 1–2 hours. This is important to improve blood flow and breathing. Ask for help if you feel weak or unsteady.  Return to your normal activities as told by your health care provider. Ask your health care provider what activities are safe for you.  Do exercises as told by your health care provider.  General instructions    Compression stockings on a person's lower legs.  Follow any diet instructions given to you by your health care provider.  Elevate your legs at night to above the level of your heart.  If you get a cut in the skin over the varicose vein and the vein bleeds:  Lie down with your leg raised.  Apply firm pressure to the cut with a clean cloth until the bleeding stops.  Place a bandage (dressing) on the cut.  Drink enough fluid to keep your urine pale yellow.  Do not use any products that contain nicotine or tobacco. These products include cigarettes, chewing tobacco, and vaping devices, such as e-cigarettes. If you need help quitting, ask your health care provider.  Wear compression stockings as told by your health care provider. Do not wear other kinds of tight clothing around your legs, pelvis, or waist.  Keep all follow-up visits. This is important.  Contact a health care provider if:  The skin around your varicose veins starts to break down.  You have more pain, redness, tenderness, or hard swelling over a vein.  You are uncomfortable because of pain.  You get a cut in the skin over a varicose vein and it will not stop bleeding.  Get help right away if:  You have chest pain.  You have trouble breathing.  You have severe leg pain.  Summary  Varicose veins are veins that have become enlarged, bulged, and twisted. They most often appear in the legs.  This condition is caused by damage to the valves in the vein. These valves help blood return to your heart.  Treatment for this condition includes frequent movements, wearing compression stockings, losing weight, and exercising regularly. In some cases, procedures are done to close off or remove the veins.  Nonsurgical treatments to close off the veins include sclerotherapy, laser therapy, and radiofrequency vein ablation.  This information is not intended to replace advice given to you by your health care provider. Make sure you discuss any questions you have with your health care provider.

## 2025-06-14 NOTE — ED ADULT NURSE NOTE - OBJECTIVE STATEMENT
pt received into obgyn room A&Ox4 ambulatory appears comfortable complaining of lower leg pain no recent travel trauma or injury.

## 2025-06-14 NOTE — ED PROVIDER NOTE - CARE PROVIDER_API CALL
Pepe Garcia  Vascular Surgery  [address], [address]  [place] [address]  Phone: [phone]  Fax: [phone]  Follow Up Time: 7-10 Days

## 2025-06-16 ENCOUNTER — APPOINTMENT (OUTPATIENT)
Dept: SURGICAL ONCOLOGY | Facility: CLINIC | Age: 70
End: 2025-06-16
Payer: MEDICARE

## 2025-06-16 ENCOUNTER — APPOINTMENT (OUTPATIENT)
Dept: INFUSION THERAPY | Facility: CLINIC | Age: 70
End: 2025-06-16

## 2025-06-16 DIAGNOSIS — J45.909 UNSPECIFIED ASTHMA, UNCOMPLICATED: ICD-10-CM

## 2025-06-16 DIAGNOSIS — Z91.010 ALLERGY TO PEANUTS: ICD-10-CM

## 2025-06-16 DIAGNOSIS — Z88.6 ALLERGY STATUS TO ANALGESIC AGENT: ICD-10-CM

## 2025-06-16 DIAGNOSIS — I10 ESSENTIAL (PRIMARY) HYPERTENSION: ICD-10-CM

## 2025-06-16 DIAGNOSIS — Z91.018 ALLERGY TO OTHER FOODS: ICD-10-CM

## 2025-06-16 DIAGNOSIS — K21.9 GASTRO-ESOPHAGEAL REFLUX DISEASE WITHOUT ESOPHAGITIS: ICD-10-CM

## 2025-06-16 DIAGNOSIS — I83.811 VARICOSE VEINS OF RIGHT LOWER EXTREMITY WITH PAIN: ICD-10-CM

## 2025-06-16 DIAGNOSIS — Z88.0 ALLERGY STATUS TO PENICILLIN: ICD-10-CM

## 2025-06-16 DIAGNOSIS — E78.5 HYPERLIPIDEMIA, UNSPECIFIED: ICD-10-CM

## 2025-06-16 PROCEDURE — 99024 POSTOP FOLLOW-UP VISIT: CPT

## 2025-06-19 ENCOUNTER — APPOINTMENT (OUTPATIENT)
Dept: HEMATOLOGY ONCOLOGY | Facility: CLINIC | Age: 70
End: 2025-06-19

## 2025-06-19 ENCOUNTER — NON-APPOINTMENT (OUTPATIENT)
Age: 70
End: 2025-06-19

## 2025-06-19 RX ORDER — GEMCITABINE HYDROCHLORIDE 38 MG/ML
1500 INJECTION, SOLUTION INTRAVENOUS ONCE
Refills: 0 | Status: COMPLETED | OUTPATIENT
Start: 2025-06-23 | End: 2025-06-23

## 2025-06-19 RX ORDER — OXALIPLATIN 5 MG/ML
110 INJECTION, SOLUTION, CONCENTRATE INTRAVENOUS ONCE
Refills: 0 | Status: COMPLETED | OUTPATIENT
Start: 2025-06-23 | End: 2025-06-23

## 2025-06-20 RX ORDER — HEPARIN SODIUM 1000 [USP'U]/ML
100 INJECTION INTRAVENOUS; SUBCUTANEOUS ONCE
Refills: 0 | Status: COMPLETED | OUTPATIENT
Start: 2025-06-23 | End: 2025-06-23

## 2025-06-22 LAB
ALBUMIN SERPL ELPH-MCNC: 3.1 G/DL
ALP BLD-CCNC: 100 U/L
ALT SERPL-CCNC: 63 U/L
ANION GAP SERPL CALC-SCNC: 8 MMOL/L
AST SERPL-CCNC: 26 U/L
BILIRUB SERPL-MCNC: 0.8 MG/DL
BUN SERPL-MCNC: 23 MG/DL
CALCIUM SERPL-MCNC: 9 MG/DL
CHLORIDE SERPL-SCNC: 102 MMOL/L
CO2 SERPL-SCNC: 29 MMOL/L
CREAT SERPL-MCNC: 1.1 MG/DL
EGFRCR SERPLBLD CKD-EPI 2021: 73 ML/MIN/1.73M2
GLUCOSE SERPL-MCNC: 101 MG/DL
HCT VFR BLD CALC: 32.9 %
HGB BLD-MCNC: 11.3 G/DL
LYMPHOCYTES # BLD AUTO: 2 K/UL
LYMPHOCYTES NFR BLD AUTO: 18.7 %
MAGNESIUM SERPL-MCNC: 2.2 MG/DL
MAN DIFF?: NO
MCHC RBC-ENTMCNC: 32.5 PG
MCHC RBC-ENTMCNC: 34.3 G/DL
MCV RBC AUTO: 94.5 FL
NEUTROPHILS # BLD AUTO: 7.3 K/UL
NEUTROPHILS NFR BLD AUTO: 70.3 %
PHOSPHATE SERPL-MCNC: 4.6 MG/DL
PLATELET # BLD AUTO: 112 K/UL
POTASSIUM SERPL-SCNC: 4.7 MMOL/L
PROT SERPL-MCNC: 6.5 G/DL
RBC # BLD: 3.48 M/UL
RBC # FLD: 14.2 %
SODIUM SERPL-SCNC: 139 MMOL/L
WBC # FLD AUTO: 10.5 K/UL

## 2025-06-23 ENCOUNTER — APPOINTMENT (OUTPATIENT)
Dept: INFUSION THERAPY | Facility: CLINIC | Age: 70
End: 2025-06-23

## 2025-06-23 ENCOUNTER — OUTPATIENT (OUTPATIENT)
Dept: OUTPATIENT SERVICES | Facility: HOSPITAL | Age: 70
LOS: 1 days | End: 2025-06-23
Payer: MEDICARE

## 2025-06-23 ENCOUNTER — APPOINTMENT (OUTPATIENT)
Dept: HEMATOLOGY ONCOLOGY | Facility: CLINIC | Age: 70
End: 2025-06-23
Payer: MEDICARE

## 2025-06-23 VITALS
DIASTOLIC BLOOD PRESSURE: 80 MMHG | HEIGHT: 72 IN | RESPIRATION RATE: 18 BRPM | OXYGEN SATURATION: 98 % | HEART RATE: 71 BPM | SYSTOLIC BLOOD PRESSURE: 125 MMHG | TEMPERATURE: 98 F | WEIGHT: 147.05 LBS

## 2025-06-23 VITALS
OXYGEN SATURATION: 96 % | TEMPERATURE: 97.8 F | HEIGHT: 73 IN | SYSTOLIC BLOOD PRESSURE: 132 MMHG | BODY MASS INDEX: 19.48 KG/M2 | DIASTOLIC BLOOD PRESSURE: 86 MMHG | WEIGHT: 147 LBS | RESPIRATION RATE: 18 BRPM | HEART RATE: 76 BPM

## 2025-06-23 VITALS
HEART RATE: 68 BPM | DIASTOLIC BLOOD PRESSURE: 78 MMHG | RESPIRATION RATE: 18 BRPM | OXYGEN SATURATION: 97 % | TEMPERATURE: 98 F | SYSTOLIC BLOOD PRESSURE: 112 MMHG

## 2025-06-23 DIAGNOSIS — Z92.3 PERSONAL HISTORY OF IRRADIATION: Chronic | ICD-10-CM

## 2025-06-23 DIAGNOSIS — Z98.890 OTHER SPECIFIED POSTPROCEDURAL STATES: Chronic | ICD-10-CM

## 2025-06-23 DIAGNOSIS — Z92.21 PERSONAL HISTORY OF ANTINEOPLASTIC CHEMOTHERAPY: Chronic | ICD-10-CM

## 2025-06-23 DIAGNOSIS — C22.1 INTRAHEPATIC BILE DUCT CARCINOMA: ICD-10-CM

## 2025-06-23 PROCEDURE — 96416 CHEMO PROLONG INFUSE W/PUMP: CPT

## 2025-06-23 PROCEDURE — 99215 OFFICE O/P EST HI 40 MIN: CPT

## 2025-06-23 PROCEDURE — 96413 CHEMO IV INFUSION 1 HR: CPT

## 2025-06-23 PROCEDURE — 96367 TX/PROPH/DG ADDL SEQ IV INF: CPT

## 2025-06-23 PROCEDURE — 96417 CHEMO IV INFUS EACH ADDL SEQ: CPT

## 2025-06-23 PROCEDURE — 96375 TX/PRO/DX INJ NEW DRUG ADDON: CPT

## 2025-06-23 RX ORDER — DEXAMETHASONE 0.5 MG/1
10 TABLET ORAL ONCE
Refills: 0 | Status: COMPLETED | OUTPATIENT
Start: 2025-06-23 | End: 2025-06-23

## 2025-06-23 RX ORDER — FOSAPREPITANT 150 MG/5ML
150 INJECTION, POWDER, LYOPHILIZED, FOR SOLUTION INTRAVENOUS ONCE
Refills: 0 | Status: COMPLETED | OUTPATIENT
Start: 2025-06-23 | End: 2025-06-23

## 2025-06-23 RX ORDER — BENZONATATE 100 MG/1
100 CAPSULE ORAL
Qty: 90 | Refills: 1 | Status: ACTIVE | COMMUNITY
Start: 2025-06-23 | End: 1900-01-01

## 2025-06-23 RX ORDER — LIDOCAINE HYDROCHLORIDE 20 MG/ML
1 JELLY TOPICAL ONCE
Refills: 0 | Status: COMPLETED | OUTPATIENT
Start: 2025-06-23 | End: 2025-06-23

## 2025-06-23 RX ORDER — PALONOSETRON HYDROCHLORIDE 0.05 MG/ML
0.25 INJECTION, SOLUTION INTRAVENOUS ONCE
Refills: 0 | Status: COMPLETED | OUTPATIENT
Start: 2025-06-23 | End: 2025-06-23

## 2025-06-23 RX ADMIN — PALONOSETRON HYDROCHLORIDE 0.25 MILLIGRAM(S): 0.05 INJECTION, SOLUTION INTRAVENOUS at 10:40

## 2025-06-23 RX ADMIN — DEXAMETHASONE 204 MILLIGRAM(S): 0.5 TABLET ORAL at 10:45

## 2025-06-23 RX ADMIN — HEPARIN SODIUM 100 MILLIGRAM(S): 1000 INJECTION INTRAVENOUS; SUBCUTANEOUS at 14:50

## 2025-06-23 RX ADMIN — DEXAMETHASONE 10 MILLIGRAM(S): 0.5 TABLET ORAL at 11:00

## 2025-06-23 RX ADMIN — Medication 10 MILLILITER(S): at 14:25

## 2025-06-23 RX ADMIN — GEMCITABINE HYDROCHLORIDE 1500 MILLIGRAM(S): 38 INJECTION, SOLUTION INTRAVENOUS at 13:45

## 2025-06-23 RX ADMIN — GEMCITABINE HYDROCHLORIDE 1500 MILLIGRAM(S): 38 INJECTION, SOLUTION INTRAVENOUS at 14:22

## 2025-06-23 RX ADMIN — OXALIPLATIN 110 MILLIGRAM(S): 5 INJECTION, SOLUTION, CONCENTRATE INTRAVENOUS at 13:40

## 2025-06-23 RX ADMIN — OXALIPLATIN 110 MILLIGRAM(S): 5 INJECTION, SOLUTION, CONCENTRATE INTRAVENOUS at 11:33

## 2025-06-23 RX ADMIN — FOSAPREPITANT 150 MILLIGRAM(S): 150 INJECTION, POWDER, LYOPHILIZED, FOR SOLUTION INTRAVENOUS at 11:30

## 2025-06-23 RX ADMIN — FOSAPREPITANT 500 MILLIGRAM(S): 150 INJECTION, POWDER, LYOPHILIZED, FOR SOLUTION INTRAVENOUS at 11:00

## 2025-07-02 ENCOUNTER — APPOINTMENT (OUTPATIENT)
Dept: HEMATOLOGY ONCOLOGY | Facility: CLINIC | Age: 70
End: 2025-07-02

## 2025-07-02 RX ORDER — GEMCITABINE HYDROCHLORIDE 38 MG/ML
1500 INJECTION, SOLUTION INTRAVENOUS ONCE
Refills: 0 | Status: COMPLETED | OUTPATIENT
Start: 2025-07-07 | End: 2025-07-07

## 2025-07-02 RX ORDER — LIDOCAINE HYDROCHLORIDE 20 MG/ML
1 JELLY TOPICAL ONCE
Refills: 0 | Status: COMPLETED | OUTPATIENT
Start: 2025-07-07 | End: 2025-07-07

## 2025-07-02 RX ORDER — OXALIPLATIN 5 MG/ML
110 INJECTION, SOLUTION, CONCENTRATE INTRAVENOUS ONCE
Refills: 0 | Status: COMPLETED | OUTPATIENT
Start: 2025-07-07 | End: 2025-07-07

## 2025-07-03 ENCOUNTER — APPOINTMENT (OUTPATIENT)
Age: 70
End: 2025-07-03
Payer: MEDICARE

## 2025-07-03 PROCEDURE — 93970 EXTREMITY STUDY: CPT

## 2025-07-03 PROCEDURE — 99204 OFFICE O/P NEW MOD 45 MIN: CPT

## 2025-07-07 ENCOUNTER — APPOINTMENT (OUTPATIENT)
Dept: INFUSION THERAPY | Facility: CLINIC | Age: 70
End: 2025-07-07

## 2025-07-07 ENCOUNTER — APPOINTMENT (OUTPATIENT)
Dept: HEMATOLOGY ONCOLOGY | Facility: CLINIC | Age: 70
End: 2025-07-07
Payer: MEDICARE

## 2025-07-07 ENCOUNTER — OUTPATIENT (OUTPATIENT)
Dept: OUTPATIENT SERVICES | Facility: HOSPITAL | Age: 70
LOS: 1 days | End: 2025-07-07
Payer: MEDICARE

## 2025-07-07 VITALS
BODY MASS INDEX: 20.08 KG/M2 | HEART RATE: 70 BPM | SYSTOLIC BLOOD PRESSURE: 119 MMHG | TEMPERATURE: 98.3 F | OXYGEN SATURATION: 97 % | RESPIRATION RATE: 18 BRPM | DIASTOLIC BLOOD PRESSURE: 76 MMHG | HEIGHT: 73 IN | WEIGHT: 151.5 LBS

## 2025-07-07 VITALS
WEIGHT: 151.9 LBS | TEMPERATURE: 97 F | HEIGHT: 72 IN | OXYGEN SATURATION: 99 % | SYSTOLIC BLOOD PRESSURE: 119 MMHG | RESPIRATION RATE: 18 BRPM | HEART RATE: 70 BPM | DIASTOLIC BLOOD PRESSURE: 74 MMHG

## 2025-07-07 DIAGNOSIS — C22.1 INTRAHEPATIC BILE DUCT CARCINOMA: ICD-10-CM

## 2025-07-07 DIAGNOSIS — Z92.21 PERSONAL HISTORY OF ANTINEOPLASTIC CHEMOTHERAPY: Chronic | ICD-10-CM

## 2025-07-07 DIAGNOSIS — Z98.890 OTHER SPECIFIED POSTPROCEDURAL STATES: Chronic | ICD-10-CM

## 2025-07-07 DIAGNOSIS — Z92.3 PERSONAL HISTORY OF IRRADIATION: Chronic | ICD-10-CM

## 2025-07-07 LAB
ALBUMIN SERPL ELPH-MCNC: 3.3 G/DL
ALP BLD-CCNC: 88 U/L
ALT SERPL-CCNC: 50 U/L
ANION GAP SERPL CALC-SCNC: 6 MMOL/L
AST SERPL-CCNC: 31 U/L
BILIRUB SERPL-MCNC: 0.8 MG/DL
BUN SERPL-MCNC: 17 MG/DL
CALCIUM SERPL-MCNC: 9 MG/DL
CEA SERPL-MCNC: 1 NG/ML
CHLORIDE SERPL-SCNC: 104 MMOL/L
CO2 SERPL-SCNC: 28 MMOL/L
CREAT SERPL-MCNC: 1 MG/DL
EGFRCR SERPLBLD CKD-EPI 2021: 82 ML/MIN/1.73M2
GLUCOSE SERPL-MCNC: 99 MG/DL
HCT VFR BLD CALC: 32.2 %
HGB BLD-MCNC: 11.1 G/DL
LYMPHOCYTES # BLD AUTO: 1.6 K/UL
LYMPHOCYTES NFR BLD AUTO: 16.7 %
MAGNESIUM SERPL-MCNC: 2.1 MG/DL
MAN DIFF?: NO
MCHC RBC-ENTMCNC: 32.6 PG
MCHC RBC-ENTMCNC: 34.5 G/DL
MCV RBC AUTO: 94.4 FL
NEUTROPHILS # BLD AUTO: 7 K/UL
NEUTROPHILS NFR BLD AUTO: 72.5 %
PHOSPHATE SERPL-MCNC: 4.2 MG/DL
PLATELET # BLD AUTO: 120 K/UL
POTASSIUM SERPL-SCNC: 5.2 MMOL/L
PROT SERPL-MCNC: 6.7 G/DL
RBC # BLD: 3.41 M/UL
RBC # FLD: 14.9 %
SODIUM SERPL-SCNC: 138 MMOL/L
WBC # FLD AUTO: 9.6 K/UL

## 2025-07-07 PROCEDURE — 96367 TX/PROPH/DG ADDL SEQ IV INF: CPT

## 2025-07-07 PROCEDURE — 96375 TX/PRO/DX INJ NEW DRUG ADDON: CPT

## 2025-07-07 PROCEDURE — 99215 OFFICE O/P EST HI 40 MIN: CPT

## 2025-07-07 PROCEDURE — 96413 CHEMO IV INFUSION 1 HR: CPT

## 2025-07-07 PROCEDURE — 96415 CHEMO IV INFUSION ADDL HR: CPT

## 2025-07-07 PROCEDURE — 96417 CHEMO IV INFUS EACH ADDL SEQ: CPT

## 2025-07-07 RX ORDER — FOSAPREPITANT 150 MG/5ML
150 INJECTION, POWDER, LYOPHILIZED, FOR SOLUTION INTRAVENOUS ONCE
Refills: 0 | Status: COMPLETED | OUTPATIENT
Start: 2025-07-07 | End: 2025-07-07

## 2025-07-07 RX ORDER — DEXAMETHASONE 0.5 MG/1
10 TABLET ORAL ONCE
Refills: 0 | Status: COMPLETED | OUTPATIENT
Start: 2025-07-07 | End: 2025-07-07

## 2025-07-07 RX ORDER — PALONOSETRON HYDROCHLORIDE 0.05 MG/ML
0.25 INJECTION, SOLUTION INTRAVENOUS ONCE
Refills: 0 | Status: COMPLETED | OUTPATIENT
Start: 2025-07-07 | End: 2025-07-07

## 2025-07-07 RX ADMIN — OXALIPLATIN 110 MILLIGRAM(S): 5 INJECTION, SOLUTION, CONCENTRATE INTRAVENOUS at 13:00

## 2025-07-07 RX ADMIN — Medication 10 MILLILITER(S): at 13:45

## 2025-07-07 RX ADMIN — GEMCITABINE HYDROCHLORIDE 1500 MILLIGRAM(S): 38 INJECTION, SOLUTION INTRAVENOUS at 13:35

## 2025-07-07 RX ADMIN — GEMCITABINE HYDROCHLORIDE 1500 MILLIGRAM(S): 38 INJECTION, SOLUTION INTRAVENOUS at 13:05

## 2025-07-07 RX ADMIN — FOSAPREPITANT 150 MILLIGRAM(S): 150 INJECTION, POWDER, LYOPHILIZED, FOR SOLUTION INTRAVENOUS at 11:15

## 2025-07-07 RX ADMIN — OXALIPLATIN 110 MILLIGRAM(S): 5 INJECTION, SOLUTION, CONCENTRATE INTRAVENOUS at 11:00

## 2025-07-07 RX ADMIN — PALONOSETRON HYDROCHLORIDE 0.25 MILLIGRAM(S): 0.05 INJECTION, SOLUTION INTRAVENOUS at 10:02

## 2025-07-07 RX ADMIN — DEXAMETHASONE 204 MILLIGRAM(S): 0.5 TABLET ORAL at 10:30

## 2025-07-07 RX ADMIN — FOSAPREPITANT 500 MILLIGRAM(S): 150 INJECTION, POWDER, LYOPHILIZED, FOR SOLUTION INTRAVENOUS at 10:45

## 2025-07-09 ENCOUNTER — APPOINTMENT (OUTPATIENT)
Dept: VASCULAR SURGERY | Facility: CLINIC | Age: 70
End: 2025-07-09
Payer: MEDICARE

## 2025-07-09 VITALS
OXYGEN SATURATION: 97 % | SYSTOLIC BLOOD PRESSURE: 122 MMHG | WEIGHT: 152 LBS | HEART RATE: 75 BPM | HEIGHT: 73 IN | DIASTOLIC BLOOD PRESSURE: 79 MMHG | TEMPERATURE: 97.2 F | BODY MASS INDEX: 20.15 KG/M2

## 2025-07-09 PROBLEM — M79.606 LOWER EXTREMITY PAIN, DIFFUSE, UNSPECIFIED LATERALITY: Status: ACTIVE | Noted: 2025-07-03

## 2025-07-09 PROBLEM — I83.893 VARICOSE VEINS OF BILATERAL LOWER EXTREMITIES WITH OTHER COMPLICATIONS: Status: ACTIVE | Noted: 2025-07-09

## 2025-07-09 PROBLEM — I83.813 VARICOSE VEINS OF BOTH LOWER EXTREMITIES WITH PAIN: Status: ACTIVE | Noted: 2025-07-09

## 2025-07-09 PROCEDURE — 99214 OFFICE O/P EST MOD 30 MIN: CPT

## 2025-07-11 ENCOUNTER — APPOINTMENT (OUTPATIENT)
Age: 70
End: 2025-07-11

## 2025-07-12 ENCOUNTER — OUTPATIENT (OUTPATIENT)
Dept: OUTPATIENT SERVICES | Facility: HOSPITAL | Age: 70
LOS: 1 days | End: 2025-07-12

## 2025-07-12 ENCOUNTER — APPOINTMENT (OUTPATIENT)
Dept: MRI IMAGING | Facility: CLINIC | Age: 70
End: 2025-07-12
Payer: MEDICARE

## 2025-07-12 DIAGNOSIS — Z98.890 OTHER SPECIFIED POSTPROCEDURAL STATES: Chronic | ICD-10-CM

## 2025-07-12 DIAGNOSIS — Z92.3 PERSONAL HISTORY OF IRRADIATION: Chronic | ICD-10-CM

## 2025-07-12 PROCEDURE — 74183 MRI ABD W/O CNTR FLWD CNTR: CPT | Mod: 26

## 2025-07-17 ENCOUNTER — APPOINTMENT (OUTPATIENT)
Dept: HEMATOLOGY ONCOLOGY | Facility: CLINIC | Age: 70
End: 2025-07-17

## 2025-07-17 LAB
ALBUMIN SERPL ELPH-MCNC: 3.2 G/DL
ALP BLD-CCNC: 85 U/L
ALT SERPL-CCNC: 32 U/L
ANION GAP SERPL CALC-SCNC: 8 MMOL/L
AST SERPL-CCNC: 24 U/L
BILIRUB SERPL-MCNC: 1.1 MG/DL
BUN SERPL-MCNC: 16 MG/DL
CALCIUM SERPL-MCNC: 9 MG/DL
CHLORIDE SERPL-SCNC: 103 MMOL/L
CO2 SERPL-SCNC: 27 MMOL/L
CREAT SERPL-MCNC: 1 MG/DL
EGFRCR SERPLBLD CKD-EPI 2021: 82 ML/MIN/1.73M2
GLUCOSE SERPL-MCNC: 114 MG/DL
HCT VFR BLD CALC: 30.2 %
HGB BLD-MCNC: 10.4 G/DL
LYMPHOCYTES # BLD AUTO: 1.2 K/UL
LYMPHOCYTES NFR BLD AUTO: 14.2 %
MAGNESIUM SERPL-MCNC: 2.1 MG/DL
MAN DIFF?: NO
MCHC RBC-ENTMCNC: 33 PG
MCHC RBC-ENTMCNC: 34.4 G/DL
MCV RBC AUTO: 95.9 FL
NEUTROPHILS # BLD AUTO: 6.4 K/UL
NEUTROPHILS NFR BLD AUTO: 74.7 %
PHOSPHATE SERPL-MCNC: 3.5 MG/DL
PLATELET # BLD AUTO: 120 K/UL
POTASSIUM SERPL-SCNC: 4.6 MMOL/L
PROT SERPL-MCNC: 6.9 G/DL
RBC # BLD: 3.15 M/UL
RBC # FLD: 16.6 %
SODIUM SERPL-SCNC: 138 MMOL/L
WBC # FLD AUTO: 8.5 K/UL

## 2025-07-21 ENCOUNTER — APPOINTMENT (OUTPATIENT)
Dept: INFUSION THERAPY | Facility: CLINIC | Age: 70
End: 2025-07-21

## 2025-07-21 ENCOUNTER — APPOINTMENT (OUTPATIENT)
Dept: HEMATOLOGY ONCOLOGY | Facility: CLINIC | Age: 70
End: 2025-07-21
Payer: MEDICARE

## 2025-07-21 ENCOUNTER — OUTPATIENT (OUTPATIENT)
Dept: OUTPATIENT SERVICES | Facility: HOSPITAL | Age: 70
LOS: 1 days | End: 2025-07-21
Payer: MEDICARE

## 2025-07-21 VITALS
OXYGEN SATURATION: 97 % | RESPIRATION RATE: 18 BRPM | DIASTOLIC BLOOD PRESSURE: 82 MMHG | SYSTOLIC BLOOD PRESSURE: 134 MMHG | TEMPERATURE: 98.1 F | WEIGHT: 154.38 LBS | HEIGHT: 73 IN | BODY MASS INDEX: 20.46 KG/M2 | HEART RATE: 67 BPM

## 2025-07-21 VITALS
HEART RATE: 74 BPM | RESPIRATION RATE: 18 BRPM | TEMPERATURE: 98 F | DIASTOLIC BLOOD PRESSURE: 69 MMHG | OXYGEN SATURATION: 98 % | SYSTOLIC BLOOD PRESSURE: 116 MMHG

## 2025-07-21 VITALS
HEIGHT: 72 IN | SYSTOLIC BLOOD PRESSURE: 134 MMHG | TEMPERATURE: 98 F | WEIGHT: 154.1 LBS | OXYGEN SATURATION: 97 % | RESPIRATION RATE: 18 BRPM | HEART RATE: 67 BPM | DIASTOLIC BLOOD PRESSURE: 82 MMHG

## 2025-07-21 DIAGNOSIS — Z98.890 OTHER SPECIFIED POSTPROCEDURAL STATES: Chronic | ICD-10-CM

## 2025-07-21 DIAGNOSIS — Z92.3 PERSONAL HISTORY OF IRRADIATION: Chronic | ICD-10-CM

## 2025-07-21 DIAGNOSIS — C22.1 INTRAHEPATIC BILE DUCT CARCINOMA: ICD-10-CM

## 2025-07-21 PROCEDURE — 99215 OFFICE O/P EST HI 40 MIN: CPT

## 2025-07-21 PROCEDURE — G2212 PROLONG OUTPT/OFFICE VIS: CPT

## 2025-07-21 PROCEDURE — 96416 CHEMO PROLONG INFUSE W/PUMP: CPT

## 2025-07-21 PROCEDURE — 96417 CHEMO IV INFUS EACH ADDL SEQ: CPT

## 2025-07-21 PROCEDURE — 96375 TX/PRO/DX INJ NEW DRUG ADDON: CPT

## 2025-07-21 PROCEDURE — 96367 TX/PROPH/DG ADDL SEQ IV INF: CPT

## 2025-07-21 PROCEDURE — 96413 CHEMO IV INFUSION 1 HR: CPT

## 2025-07-21 RX ORDER — FOSAPREPITANT 150 MG/5ML
150 INJECTION, POWDER, LYOPHILIZED, FOR SOLUTION INTRAVENOUS ONCE
Refills: 0 | Status: COMPLETED | OUTPATIENT
Start: 2025-07-21 | End: 2025-07-21

## 2025-07-21 RX ORDER — HEPARIN SODIUM 1000 [USP'U]/ML
100 INJECTION INTRAVENOUS; SUBCUTANEOUS ONCE
Refills: 0 | Status: COMPLETED | OUTPATIENT
Start: 2025-07-21 | End: 2025-07-21

## 2025-07-21 RX ORDER — PALONOSETRON HYDROCHLORIDE 0.05 MG/ML
0.25 INJECTION, SOLUTION INTRAVENOUS ONCE
Refills: 0 | Status: COMPLETED | OUTPATIENT
Start: 2025-07-21 | End: 2025-07-21

## 2025-07-21 RX ORDER — OXALIPLATIN 5 MG/ML
110 INJECTION, SOLUTION, CONCENTRATE INTRAVENOUS ONCE
Refills: 0 | Status: COMPLETED | OUTPATIENT
Start: 2025-07-21 | End: 2025-07-21

## 2025-07-21 RX ORDER — FLOXURIDINE 500 MG/1
100 INJECTION, POWDER, LYOPHILIZED, FOR SOLUTION INTRA-ARTERIAL ONCE
Refills: 0 | Status: DISCONTINUED | OUTPATIENT
Start: 2025-07-21 | End: 2025-07-21

## 2025-07-21 RX ORDER — DEXAMETHASONE 0.5 MG/1
10 TABLET ORAL ONCE
Refills: 0 | Status: COMPLETED | OUTPATIENT
Start: 2025-07-21 | End: 2025-07-21

## 2025-07-21 RX ORDER — GEMCITABINE HYDROCHLORIDE 38 MG/ML
1500 INJECTION, SOLUTION INTRAVENOUS ONCE
Refills: 0 | Status: COMPLETED | OUTPATIENT
Start: 2025-07-21 | End: 2025-07-21

## 2025-07-21 RX ORDER — LIDOCAINE HYDROCHLORIDE 20 MG/ML
1 JELLY TOPICAL ONCE
Refills: 0 | Status: COMPLETED | OUTPATIENT
Start: 2025-07-21 | End: 2025-07-21

## 2025-07-21 RX ADMIN — HEPARIN SODIUM 100 MILLIGRAM(S): 1000 INJECTION INTRAVENOUS; SUBCUTANEOUS at 16:26

## 2025-07-21 RX ADMIN — GEMCITABINE HYDROCHLORIDE 1500 MILLIGRAM(S): 38 INJECTION, SOLUTION INTRAVENOUS at 14:55

## 2025-07-21 RX ADMIN — Medication 10 MILLILITER(S): at 16:02

## 2025-07-21 RX ADMIN — PALONOSETRON HYDROCHLORIDE 0.25 MILLIGRAM(S): 0.05 INJECTION, SOLUTION INTRAVENOUS at 11:45

## 2025-07-21 RX ADMIN — Medication 1000 MILLILITER(S): at 11:00

## 2025-07-21 RX ADMIN — OXALIPLATIN 110 MILLIGRAM(S): 5 INJECTION, SOLUTION, CONCENTRATE INTRAVENOUS at 14:22

## 2025-07-21 RX ADMIN — FOSAPREPITANT 500 MILLIGRAM(S): 150 INJECTION, POWDER, LYOPHILIZED, FOR SOLUTION INTRAVENOUS at 11:50

## 2025-07-21 RX ADMIN — Medication 500 MILLILITER(S): at 11:30

## 2025-07-21 RX ADMIN — GEMCITABINE HYDROCHLORIDE 1500 MILLIGRAM(S): 38 INJECTION, SOLUTION INTRAVENOUS at 14:25

## 2025-07-21 RX ADMIN — OXALIPLATIN 110 MILLIGRAM(S): 5 INJECTION, SOLUTION, CONCENTRATE INTRAVENOUS at 12:20

## 2025-07-21 RX ADMIN — DEXAMETHASONE 10 MILLIGRAM(S): 0.5 TABLET ORAL at 11:45

## 2025-07-21 RX ADMIN — FOSAPREPITANT 150 MILLIGRAM(S): 150 INJECTION, POWDER, LYOPHILIZED, FOR SOLUTION INTRAVENOUS at 12:20

## 2025-07-21 RX ADMIN — Medication 500 MILLILITER(S): at 14:55

## 2025-07-21 RX ADMIN — Medication 1000 MILLILITER(S): at 16:00

## 2025-07-21 RX ADMIN — DEXAMETHASONE 204 MILLIGRAM(S): 0.5 TABLET ORAL at 11:30

## 2025-07-22 LAB — CEA SERPL-MCNC: 0.8 NG/ML

## 2025-07-23 ENCOUNTER — NON-APPOINTMENT (OUTPATIENT)
Age: 70
End: 2025-07-23

## 2025-07-25 ENCOUNTER — RESULT REVIEW (OUTPATIENT)
Age: 70
End: 2025-07-25

## 2025-07-29 ENCOUNTER — APPOINTMENT (OUTPATIENT)
Dept: CT IMAGING | Facility: CLINIC | Age: 70
End: 2025-07-29
Payer: MEDICARE

## 2025-07-29 ENCOUNTER — OUTPATIENT (OUTPATIENT)
Dept: OUTPATIENT SERVICES | Facility: HOSPITAL | Age: 70
LOS: 1 days | End: 2025-07-29

## 2025-07-29 DIAGNOSIS — Z98.890 OTHER SPECIFIED POSTPROCEDURAL STATES: Chronic | ICD-10-CM

## 2025-07-29 DIAGNOSIS — Z92.3 PERSONAL HISTORY OF IRRADIATION: Chronic | ICD-10-CM

## 2025-07-29 DIAGNOSIS — Z92.21 PERSONAL HISTORY OF ANTINEOPLASTIC CHEMOTHERAPY: Chronic | ICD-10-CM

## 2025-07-29 PROCEDURE — 74177 CT ABD & PELVIS W/CONTRAST: CPT | Mod: 26

## 2025-07-30 RX ORDER — OXALIPLATIN 5 MG/ML
110 INJECTION, SOLUTION, CONCENTRATE INTRAVENOUS ONCE
Refills: 0 | Status: COMPLETED | OUTPATIENT
Start: 2025-08-04 | End: 2025-08-04

## 2025-07-30 RX ORDER — GEMCITABINE HYDROCHLORIDE 38 MG/ML
1500 INJECTION, SOLUTION INTRAVENOUS ONCE
Refills: 0 | Status: COMPLETED | OUTPATIENT
Start: 2025-08-04 | End: 2025-08-04

## 2025-07-30 RX ORDER — DEXAMETHASONE 0.5 MG/1
10 TABLET ORAL ONCE
Refills: 0 | Status: COMPLETED | OUTPATIENT
Start: 2025-08-04 | End: 2025-08-04

## 2025-07-30 RX ORDER — LIDOCAINE HYDROCHLORIDE 20 MG/ML
1 JELLY TOPICAL ONCE
Refills: 0 | Status: COMPLETED | OUTPATIENT
Start: 2025-08-04 | End: 2025-08-04

## 2025-07-30 RX ORDER — PALONOSETRON HYDROCHLORIDE 0.05 MG/ML
0.25 INJECTION, SOLUTION INTRAVENOUS ONCE
Refills: 0 | Status: COMPLETED | OUTPATIENT
Start: 2025-08-04 | End: 2025-08-04

## 2025-07-30 RX ORDER — FOSAPREPITANT 150 MG/5ML
150 INJECTION, POWDER, LYOPHILIZED, FOR SOLUTION INTRAVENOUS ONCE
Refills: 0 | Status: COMPLETED | OUTPATIENT
Start: 2025-08-04 | End: 2025-08-04

## 2025-07-31 ENCOUNTER — LABORATORY RESULT (OUTPATIENT)
Age: 70
End: 2025-07-31

## 2025-07-31 ENCOUNTER — APPOINTMENT (OUTPATIENT)
Dept: HEMATOLOGY ONCOLOGY | Facility: CLINIC | Age: 70
End: 2025-07-31

## 2025-08-04 ENCOUNTER — APPOINTMENT (OUTPATIENT)
Dept: INFUSION THERAPY | Facility: CLINIC | Age: 70
End: 2025-08-04

## 2025-08-04 ENCOUNTER — APPOINTMENT (OUTPATIENT)
Dept: HEMATOLOGY ONCOLOGY | Facility: CLINIC | Age: 70
End: 2025-08-04
Payer: MEDICARE

## 2025-08-04 ENCOUNTER — OUTPATIENT (OUTPATIENT)
Dept: OUTPATIENT SERVICES | Facility: HOSPITAL | Age: 70
LOS: 1 days | End: 2025-08-04
Payer: MEDICARE

## 2025-08-04 VITALS
OXYGEN SATURATION: 97 % | RESPIRATION RATE: 18 BRPM | DIASTOLIC BLOOD PRESSURE: 69 MMHG | SYSTOLIC BLOOD PRESSURE: 119 MMHG | TEMPERATURE: 98 F | HEART RATE: 64 BPM

## 2025-08-04 VITALS
SYSTOLIC BLOOD PRESSURE: 125 MMHG | RESPIRATION RATE: 17 BRPM | TEMPERATURE: 98 F | OXYGEN SATURATION: 99 % | HEIGHT: 72 IN | HEART RATE: 71 BPM | WEIGHT: 154.98 LBS | DIASTOLIC BLOOD PRESSURE: 83 MMHG

## 2025-08-04 VITALS
WEIGHT: 154 LBS | OXYGEN SATURATION: 98 % | RESPIRATION RATE: 18 BRPM | HEIGHT: 73 IN | DIASTOLIC BLOOD PRESSURE: 80 MMHG | BODY MASS INDEX: 20.41 KG/M2 | SYSTOLIC BLOOD PRESSURE: 125 MMHG | TEMPERATURE: 97.9 F | HEART RATE: 80 BPM

## 2025-08-04 DIAGNOSIS — Z98.890 OTHER SPECIFIED POSTPROCEDURAL STATES: Chronic | ICD-10-CM

## 2025-08-04 DIAGNOSIS — C22.1 INTRAHEPATIC BILE DUCT CARCINOMA: ICD-10-CM

## 2025-08-04 DIAGNOSIS — Z92.21 PERSONAL HISTORY OF ANTINEOPLASTIC CHEMOTHERAPY: Chronic | ICD-10-CM

## 2025-08-04 DIAGNOSIS — Z92.3 PERSONAL HISTORY OF IRRADIATION: Chronic | ICD-10-CM

## 2025-08-04 PROCEDURE — 99215 OFFICE O/P EST HI 40 MIN: CPT | Mod: 25

## 2025-08-04 PROCEDURE — 96417 CHEMO IV INFUS EACH ADDL SEQ: CPT

## 2025-08-04 PROCEDURE — 96415 CHEMO IV INFUSION ADDL HR: CPT

## 2025-08-04 PROCEDURE — 96375 TX/PRO/DX INJ NEW DRUG ADDON: CPT

## 2025-08-04 PROCEDURE — G2212 PROLONG OUTPT/OFFICE VIS: CPT

## 2025-08-04 PROCEDURE — 96413 CHEMO IV INFUSION 1 HR: CPT

## 2025-08-04 PROCEDURE — 96367 TX/PROPH/DG ADDL SEQ IV INF: CPT

## 2025-08-04 RX ORDER — SODIUM CHLORIDE 9 G/1000ML
21.5 INJECTION, SOLUTION INTRAVENOUS ONCE
Refills: 0 | Status: COMPLETED | OUTPATIENT
Start: 2025-08-04 | End: 2025-08-04

## 2025-08-04 RX ADMIN — SODIUM CHLORIDE 0.05 MILLILITER(S): 9 INJECTION, SOLUTION INTRAVENOUS at 16:45

## 2025-08-04 RX ADMIN — Medication 10 MILLILITER(S): at 16:35

## 2025-08-04 RX ADMIN — OXALIPLATIN 110 MILLIGRAM(S): 5 INJECTION, SOLUTION, CONCENTRATE INTRAVENOUS at 14:34

## 2025-08-04 RX ADMIN — Medication 1000 MILLILITER(S): at 16:20

## 2025-08-04 RX ADMIN — Medication 500 MILLILITER(S): at 11:30

## 2025-08-04 RX ADMIN — DEXAMETHASONE 10 MILLIGRAM(S): 0.5 TABLET ORAL at 11:51

## 2025-08-04 RX ADMIN — Medication 1000 MILLILITER(S): at 10:56

## 2025-08-04 RX ADMIN — FOSAPREPITANT 150 MILLIGRAM(S): 150 INJECTION, POWDER, LYOPHILIZED, FOR SOLUTION INTRAVENOUS at 12:25

## 2025-08-04 RX ADMIN — OXALIPLATIN 110 MILLIGRAM(S): 5 INJECTION, SOLUTION, CONCENTRATE INTRAVENOUS at 12:34

## 2025-08-04 RX ADMIN — DEXAMETHASONE 204 MILLIGRAM(S): 0.5 TABLET ORAL at 11:36

## 2025-08-04 RX ADMIN — PALONOSETRON HYDROCHLORIDE 0.25 MILLIGRAM(S): 0.05 INJECTION, SOLUTION INTRAVENOUS at 11:31

## 2025-08-04 RX ADMIN — GEMCITABINE HYDROCHLORIDE 1500 MILLIGRAM(S): 38 INJECTION, SOLUTION INTRAVENOUS at 15:15

## 2025-08-04 RX ADMIN — GEMCITABINE HYDROCHLORIDE 1500 MILLIGRAM(S): 38 INJECTION, SOLUTION INTRAVENOUS at 14:45

## 2025-08-04 RX ADMIN — FOSAPREPITANT 500 MILLIGRAM(S): 150 INJECTION, POWDER, LYOPHILIZED, FOR SOLUTION INTRAVENOUS at 11:55

## 2025-08-04 RX ADMIN — Medication 1000 MILLILITER(S): at 15:20

## 2025-08-06 LAB
HCT VFR BLD CALC: 30.5 %
HGB BLD-MCNC: 10.5 G/DL
LYMPHOCYTES # BLD AUTO: 1.2 K/UL
LYMPHOCYTES NFR BLD AUTO: 18.9 %
MAN DIFF?: NO
MCHC RBC-ENTMCNC: 33.4 PG
MCHC RBC-ENTMCNC: 34.4 G/DL
MCV RBC AUTO: 97.1 FL
NEUTROPHILS # BLD AUTO: 4.7 K/UL
NEUTROPHILS NFR BLD AUTO: 69.8 %
PLATELET # BLD AUTO: 120 K/UL
RBC # BLD: 3.14 M/UL
RBC # FLD: 17.2 %
WBC # FLD AUTO: 6.6 K/UL

## 2025-08-06 RX ORDER — FUTIBATINIB 4 MG/1
4 TABLET ORAL DAILY
Qty: 3 | Refills: 4 | Status: ACTIVE | COMMUNITY
Start: 2025-08-06 | End: 1900-01-01

## 2025-08-06 RX ORDER — FUTIBATINIB 4 MG/1
4 TABLET ORAL DAILY
Qty: 4 | Refills: 4 | Status: ACTIVE | COMMUNITY
Start: 2025-08-06 | End: 1900-01-01

## 2025-08-14 ENCOUNTER — APPOINTMENT (OUTPATIENT)
Dept: HEMATOLOGY ONCOLOGY | Facility: CLINIC | Age: 70
End: 2025-08-14

## 2025-08-14 LAB
ALBUMIN SERPL ELPH-MCNC: 3.3 G/DL
ALP BLD-CCNC: 94 U/L
ALT SERPL-CCNC: 39 U/L
ANION GAP SERPL CALC-SCNC: 13 MMOL/L
AST SERPL-CCNC: 25 U/L
AUTO BASOPHILS #: 0.01 K/UL
AUTO BASOPHILS %: 0.2 %
AUTO EOSINOPHILS #: 0.01 K/UL
AUTO EOSINOPHILS %: 0.2 %
AUTO IMMATURE GRANULOCYTES #: 0.19 K/UL
AUTO LYMPHOCYTES #: 1.06 K/UL
AUTO LYMPHOCYTES %: 15.9 %
AUTO MONOCYTES #: 0.93 K/UL
AUTO MONOCYTES %: 14 %
AUTO NEUTROPHILS #: 4.45 K/UL
AUTO NEUTROPHILS %: 66.8 %
AUTO NRBC #: 0.04 K/UL
BILIRUB SERPL-MCNC: 0.8 MG/DL
BUN SERPL-MCNC: 19 MG/DL
CALCIUM SERPL-MCNC: 9.5 MG/DL
CHLORIDE SERPL-SCNC: 101 MMOL/L
CO2 SERPL-SCNC: 26 MMOL/L
CREAT SERPL-MCNC: 1 MG/DL
EGFRCR SERPLBLD CKD-EPI 2021: 82 ML/MIN/1.73M2
GLUCOSE SERPL-MCNC: 95 MG/DL
HCT VFR BLD CALC: 29.8 %
HGB BLD-MCNC: 9.8 G/DL
IMM GRANULOCYTES NFR BLD AUTO: 2.9 %
MAN DIFF?: NORMAL
MCHC RBC-ENTMCNC: 32.9 G/DL
MCHC RBC-ENTMCNC: 34.1 PG
MCV RBC AUTO: 103.8 FL
PHOSPHATE SERPL-MCNC: 5 MG/DL
PLATELET # BLD AUTO: 104 K/UL
PMV BLD AUTO: 0 /100 WBCS
PMV BLD: 10.2 FL
POTASSIUM SERPL-SCNC: 4.8 MMOL/L
PROT SERPL-MCNC: 7.3 G/DL
RBC # BLD: 2.87 M/UL
RBC # FLD: 17.7 %
SODIUM SERPL-SCNC: 140 MMOL/L
WBC # FLD AUTO: 6.65 K/UL

## 2025-08-15 ENCOUNTER — APPOINTMENT (OUTPATIENT)
Dept: SURGICAL ONCOLOGY | Facility: CLINIC | Age: 70
End: 2025-08-15

## 2025-08-15 DIAGNOSIS — C22.1 INTRAHEPATIC BILE DUCT CARCINOMA: ICD-10-CM

## 2025-08-15 LAB — CANCER AG19-9 SERPL-ACNC: 16 U/ML

## 2025-08-15 PROCEDURE — 99212 OFFICE O/P EST SF 10 MIN: CPT | Mod: 2W

## 2025-08-15 RX ORDER — SODIUM CHLORIDE 9 G/1000ML
21.5 INJECTION, SOLUTION INTRAVENOUS ONCE
Refills: 0 | Status: COMPLETED | OUTPATIENT
Start: 2025-08-18 | End: 2025-08-18

## 2025-08-18 ENCOUNTER — OUTPATIENT (OUTPATIENT)
Dept: OUTPATIENT SERVICES | Facility: HOSPITAL | Age: 70
LOS: 1 days | End: 2025-08-18
Payer: MEDICARE

## 2025-08-18 ENCOUNTER — APPOINTMENT (OUTPATIENT)
Dept: HEMATOLOGY ONCOLOGY | Facility: CLINIC | Age: 70
End: 2025-08-18
Payer: MEDICARE

## 2025-08-18 ENCOUNTER — APPOINTMENT (OUTPATIENT)
Dept: INFUSION THERAPY | Facility: CLINIC | Age: 70
End: 2025-08-18

## 2025-08-18 VITALS
SYSTOLIC BLOOD PRESSURE: 126 MMHG | RESPIRATION RATE: 18 BRPM | TEMPERATURE: 98 F | HEIGHT: 72 IN | HEART RATE: 66 BPM | WEIGHT: 160.06 LBS | OXYGEN SATURATION: 98 % | DIASTOLIC BLOOD PRESSURE: 80 MMHG

## 2025-08-18 VITALS
SYSTOLIC BLOOD PRESSURE: 130 MMHG | DIASTOLIC BLOOD PRESSURE: 79 MMHG | RESPIRATION RATE: 18 BRPM | TEMPERATURE: 97.6 F | OXYGEN SATURATION: 99 % | HEART RATE: 67 BPM | BODY MASS INDEX: 21.2 KG/M2 | WEIGHT: 160 LBS | HEIGHT: 73 IN

## 2025-08-18 DIAGNOSIS — Z98.890 OTHER SPECIFIED POSTPROCEDURAL STATES: Chronic | ICD-10-CM

## 2025-08-18 DIAGNOSIS — C22.1 INTRAHEPATIC BILE DUCT CARCINOMA: ICD-10-CM

## 2025-08-18 DIAGNOSIS — Z92.21 PERSONAL HISTORY OF ANTINEOPLASTIC CHEMOTHERAPY: Chronic | ICD-10-CM

## 2025-08-18 DIAGNOSIS — Z92.3 PERSONAL HISTORY OF IRRADIATION: Chronic | ICD-10-CM

## 2025-08-18 PROCEDURE — G2211 COMPLEX E/M VISIT ADD ON: CPT

## 2025-08-18 PROCEDURE — 96523 IRRIG DRUG DELIVERY DEVICE: CPT

## 2025-08-18 PROCEDURE — 99215 OFFICE O/P EST HI 40 MIN: CPT

## 2025-08-18 RX ADMIN — SODIUM CHLORIDE 21.5 MILLILITER(S): 9 INJECTION, SOLUTION INTRAVENOUS at 11:10

## 2025-08-18 RX ADMIN — SODIUM CHLORIDE 0.05 MILLILITER(S): 9 INJECTION, SOLUTION INTRAVENOUS at 11:05

## 2025-08-23 PROBLEM — C22.1 CHOLANGIOCARCINOMA OF LIVER: Status: ACTIVE | Noted: 2025-08-19

## 2025-08-29 ENCOUNTER — NON-APPOINTMENT (OUTPATIENT)
Age: 70
End: 2025-08-29

## 2025-09-02 ENCOUNTER — APPOINTMENT (OUTPATIENT)
Dept: INFUSION THERAPY | Facility: CLINIC | Age: 70
End: 2025-09-02

## 2025-09-02 ENCOUNTER — OUTPATIENT (OUTPATIENT)
Dept: OUTPATIENT SERVICES | Facility: HOSPITAL | Age: 70
LOS: 1 days | End: 2025-09-02
Payer: MEDICARE

## 2025-09-02 ENCOUNTER — APPOINTMENT (OUTPATIENT)
Dept: HEMATOLOGY ONCOLOGY | Facility: CLINIC | Age: 70
End: 2025-09-02

## 2025-09-02 VITALS
RESPIRATION RATE: 18 BRPM | WEIGHT: 162.92 LBS | HEIGHT: 72 IN | DIASTOLIC BLOOD PRESSURE: 81 MMHG | OXYGEN SATURATION: 99 % | SYSTOLIC BLOOD PRESSURE: 124 MMHG | HEART RATE: 57 BPM | TEMPERATURE: 97 F

## 2025-09-02 DIAGNOSIS — Z92.21 PERSONAL HISTORY OF ANTINEOPLASTIC CHEMOTHERAPY: Chronic | ICD-10-CM

## 2025-09-02 DIAGNOSIS — Z98.890 OTHER SPECIFIED POSTPROCEDURAL STATES: Chronic | ICD-10-CM

## 2025-09-02 DIAGNOSIS — Z92.3 PERSONAL HISTORY OF IRRADIATION: Chronic | ICD-10-CM

## 2025-09-02 DIAGNOSIS — C22.1 INTRAHEPATIC BILE DUCT CARCINOMA: ICD-10-CM

## 2025-09-02 PROCEDURE — 96523 IRRIG DRUG DELIVERY DEVICE: CPT

## 2025-09-02 RX ORDER — SODIUM CHLORIDE 9 G/1000ML
21.5 INJECTION, SOLUTION INTRAVENOUS ONCE
Refills: 0 | Status: COMPLETED | OUTPATIENT
Start: 2025-09-02 | End: 2025-09-02

## 2025-09-02 RX ADMIN — SODIUM CHLORIDE 21.5 MILLILITER(S): 9 INJECTION, SOLUTION INTRAVENOUS at 13:19

## 2025-09-02 RX ADMIN — SODIUM CHLORIDE 0.05 MILLILITER(S): 9 INJECTION, SOLUTION INTRAVENOUS at 13:10

## 2025-09-15 ENCOUNTER — APPOINTMENT (OUTPATIENT)
Dept: HEMATOLOGY ONCOLOGY | Facility: CLINIC | Age: 70
End: 2025-09-15
Payer: MEDICARE

## 2025-09-15 ENCOUNTER — APPOINTMENT (OUTPATIENT)
Dept: INFUSION THERAPY | Facility: CLINIC | Age: 70
End: 2025-09-15

## 2025-09-15 ENCOUNTER — APPOINTMENT (OUTPATIENT)
Dept: RADIOLOGY | Facility: CLINIC | Age: 70
End: 2025-09-15

## 2025-09-15 VITALS
OXYGEN SATURATION: 99 % | BODY MASS INDEX: 21.74 KG/M2 | HEART RATE: 64 BPM | TEMPERATURE: 97.9 F | DIASTOLIC BLOOD PRESSURE: 83 MMHG | RESPIRATION RATE: 18 BRPM | HEIGHT: 73 IN | SYSTOLIC BLOOD PRESSURE: 115 MMHG | WEIGHT: 164 LBS

## 2025-09-15 DIAGNOSIS — C22.1 INTRAHEPATIC BILE DUCT CARCINOMA: ICD-10-CM

## 2025-09-15 PROCEDURE — 99215 OFFICE O/P EST HI 40 MIN: CPT

## 2025-09-16 LAB
ALBUMIN SERPL ELPH-MCNC: 3.5 G/DL
ALP BLD-CCNC: 100 U/L
ALT SERPL-CCNC: 41 U/L
ANION GAP SERPL CALC-SCNC: 11 MMOL/L
AST SERPL-CCNC: 27 U/L
BILIRUB SERPL-MCNC: 0.9 MG/DL
BUN SERPL-MCNC: 18 MG/DL
CALCIUM SERPL-MCNC: 8.9 MG/DL
CANCER AG19-9 SERPL-ACNC: 14 U/ML
CHLORIDE SERPL-SCNC: 105 MMOL/L
CO2 SERPL-SCNC: 25 MMOL/L
CREAT SERPL-MCNC: 1.4 MG/DL
EGFRCR SERPLBLD CKD-EPI 2021: 55 ML/MIN/1.73M2
GLUCOSE SERPL-MCNC: 103 MG/DL
HCT VFR BLD CALC: 30.9 %
HGB BLD-MCNC: 10.6 G/DL
LYMPHOCYTES # BLD AUTO: 1.2 K/UL
LYMPHOCYTES NFR BLD AUTO: 26.1 %
MAGNESIUM SERPL-MCNC: 2.3 MG/DL
MAN DIFF?: NO
MCHC RBC-ENTMCNC: 34.3 G/DL
MCHC RBC-ENTMCNC: 34.5 PG
MCV RBC AUTO: 100.7 FL
NEUTROPHILS # BLD AUTO: 3 K/UL
NEUTROPHILS NFR BLD AUTO: 64.2 %
PHOSPHATE SERPL-MCNC: 5.6 MG/DL
PLATELET # BLD AUTO: 179 K/UL
POTASSIUM SERPL-SCNC: 4.6 MMOL/L
PROT SERPL-MCNC: 6.8 G/DL
RBC # BLD: 3.07 M/UL
RBC # FLD: 14.4 %
SODIUM SERPL-SCNC: 141 MMOL/L
WBC # FLD AUTO: 4.6 K/UL

## 2025-09-19 ENCOUNTER — APPOINTMENT (OUTPATIENT)
Age: 70
End: 2025-09-19
Payer: MEDICARE

## 2025-09-19 DIAGNOSIS — L60.0 INGROWING NAIL: ICD-10-CM

## 2025-09-19 DIAGNOSIS — M79.675 PAIN IN LEFT TOE(S): ICD-10-CM

## 2025-09-19 PROCEDURE — 99203 OFFICE O/P NEW LOW 30 MIN: CPT

## 2025-09-21 PROBLEM — L60.0 INGROWN TOENAIL: Status: ACTIVE | Noted: 2025-09-21

## 2025-09-21 PROBLEM — M79.675 PAIN OF TOE OF LEFT FOOT: Status: ACTIVE | Noted: 2025-09-21

## (undated) DEVICE — ELCTR BOVIE PENCIL SMOKE EVACUATION

## (undated) DEVICE — ELCTR GROUNDING PAD ADULT COVIDIEN

## (undated) DEVICE — ELCTR AQUAMANTYS BIPOLAR SEALER 6.0

## (undated) DEVICE — ELCTR BOVIE TIP BLADE INSULATED 6.5" EDGE

## (undated) DEVICE — PROTECTOR HEEL / ELBOW FLUFFY

## (undated) DEVICE — SUT SILK 2-0 30" TIES

## (undated) DEVICE — WARMING BLANKET LOWER ADULT

## (undated) DEVICE — DRSG DERMABOND 0.7ML

## (undated) DEVICE — SUT SILK 0 18" TIES

## (undated) DEVICE — DRAPE WARMING SOLUTION 44 X 44"

## (undated) DEVICE — PACK MAJOR ABDOMINAL WITH LAP

## (undated) DEVICE — ELCTR BOVIE TIP BLADE INSULATED 2.75" EDGE

## (undated) DEVICE — STAPLER ECHELON CURVED 35MM

## (undated) DEVICE — DRSG TAPE UMBILICAL COTTON 2" X 30 X 1/8"

## (undated) DEVICE — SUT CHROMIC 1 27" BP

## (undated) DEVICE — SUT SILK 2-0 24" TIES

## (undated) DEVICE — DRSG PREVENA PLUS SYSTEM

## (undated) DEVICE — SUT PROLENE 5-0 36" RB-1

## (undated) DEVICE — POOLE SUCTION TIP

## (undated) DEVICE — SUT SILK 3-0 18" TIES

## (undated) DEVICE — DRSG TEGADERM 4 X 4.75"

## (undated) DEVICE — SOL IRR POUR H2O 1500ML

## (undated) DEVICE — DRSG TELFA 3 X 8

## (undated) DEVICE — NDL ENDO ASPIRATION SLIMLINE HDL 25G

## (undated) DEVICE — SUT PDS II 1 48" TP-1

## (undated) DEVICE — SUT SILK 3-0 18" SH (POP-OFF)

## (undated) DEVICE — STAPLER COVIDIEN ENDO GIA SHORT HANDLE

## (undated) DEVICE — POSITIONER PATIENT SAFETY STRAP 3X60"

## (undated) DEVICE — SUT SILK 3-0 30" TIES

## (undated) DEVICE — PACK CARDIOVASCULAR UNIVERSAL

## (undated) DEVICE — GLV 8 PROTEXIS (WHITE)

## (undated) DEVICE — TOURNIQUET SET SURE-SNARE 22FR (2 TUBES, 2 UMBILICAL TAPES, 2 PLASTIC SNARES) 5"

## (undated) DEVICE — SUT PROLENE 3-0 36" SH

## (undated) DEVICE — SUT PROLENE 4-0 36" RB-1

## (undated) DEVICE — SUT BOOT STANDARD (YELLOW) 5 PAIR

## (undated) DEVICE — LIGASURE IMPACT

## (undated) DEVICE — SOL IRR POUR NS 0.9% 1500ML